# Patient Record
Sex: FEMALE | Race: OTHER | NOT HISPANIC OR LATINO | URBAN - METROPOLITAN AREA
[De-identification: names, ages, dates, MRNs, and addresses within clinical notes are randomized per-mention and may not be internally consistent; named-entity substitution may affect disease eponyms.]

---

## 2017-03-30 ENCOUNTER — EMERGENCY (EMERGENCY)
Facility: HOSPITAL | Age: 67
LOS: 1 days | Discharge: HOME | End: 2017-03-30
Attending: EMERGENCY MEDICINE | Admitting: EMERGENCY MEDICINE
Payer: MEDICARE

## 2017-03-30 VITALS
OXYGEN SATURATION: 100 % | RESPIRATION RATE: 18 BRPM | SYSTOLIC BLOOD PRESSURE: 167 MMHG | TEMPERATURE: 98 F | DIASTOLIC BLOOD PRESSURE: 73 MMHG | HEART RATE: 70 BPM

## 2017-03-30 VITALS
DIASTOLIC BLOOD PRESSURE: 83 MMHG | SYSTOLIC BLOOD PRESSURE: 151 MMHG | HEART RATE: 20 BPM | TEMPERATURE: 98 F | OXYGEN SATURATION: 100 % | RESPIRATION RATE: 20 BRPM

## 2017-03-30 LAB
ALBUMIN SERPL ELPH-MCNC: 4.5 G/DL — SIGNIFICANT CHANGE UP (ref 3.3–5)
ALP SERPL-CCNC: 116 U/L — SIGNIFICANT CHANGE UP (ref 40–120)
ALT FLD-CCNC: 32 U/L — SIGNIFICANT CHANGE UP (ref 4–33)
AST SERPL-CCNC: 25 U/L — SIGNIFICANT CHANGE UP (ref 4–32)
BASOPHILS # BLD AUTO: 0.03 K/UL — SIGNIFICANT CHANGE UP (ref 0–0.2)
BASOPHILS NFR BLD AUTO: 0.4 % — SIGNIFICANT CHANGE UP (ref 0–2)
BILIRUB SERPL-MCNC: 0.4 MG/DL — SIGNIFICANT CHANGE UP (ref 0.2–1.2)
BLD GP AB SCN SERPL QL: NEGATIVE — SIGNIFICANT CHANGE UP
BUN SERPL-MCNC: 14 MG/DL — SIGNIFICANT CHANGE UP (ref 7–23)
CALCIUM SERPL-MCNC: 10.1 MG/DL — SIGNIFICANT CHANGE UP (ref 8.4–10.5)
CHLORIDE SERPL-SCNC: 104 MMOL/L — SIGNIFICANT CHANGE UP (ref 98–107)
CO2 SERPL-SCNC: 24 MMOL/L — SIGNIFICANT CHANGE UP (ref 22–31)
CREAT SERPL-MCNC: 0.78 MG/DL — SIGNIFICANT CHANGE UP (ref 0.5–1.3)
EOSINOPHIL # BLD AUTO: 0.09 K/UL — SIGNIFICANT CHANGE UP (ref 0–0.5)
EOSINOPHIL NFR BLD AUTO: 1.3 % — SIGNIFICANT CHANGE UP (ref 0–6)
GLUCOSE SERPL-MCNC: 86 MG/DL — SIGNIFICANT CHANGE UP (ref 70–99)
HCT VFR BLD CALC: 41.7 % — SIGNIFICANT CHANGE UP (ref 34.5–45)
HGB BLD-MCNC: 14.3 G/DL — SIGNIFICANT CHANGE UP (ref 11.5–15.5)
IMM GRANULOCYTES NFR BLD AUTO: 0.1 % — SIGNIFICANT CHANGE UP (ref 0–1.5)
INR BLD: 1.06 — SIGNIFICANT CHANGE UP (ref 0.88–1.17)
LYMPHOCYTES # BLD AUTO: 2.67 K/UL — SIGNIFICANT CHANGE UP (ref 1–3.3)
LYMPHOCYTES # BLD AUTO: 38.8 % — SIGNIFICANT CHANGE UP (ref 13–44)
MCHC RBC-ENTMCNC: 30.3 PG — SIGNIFICANT CHANGE UP (ref 27–34)
MCHC RBC-ENTMCNC: 34.3 % — SIGNIFICANT CHANGE UP (ref 32–36)
MCV RBC AUTO: 88.3 FL — SIGNIFICANT CHANGE UP (ref 80–100)
MONOCYTES # BLD AUTO: 0.36 K/UL — SIGNIFICANT CHANGE UP (ref 0–0.9)
MONOCYTES NFR BLD AUTO: 5.2 % — SIGNIFICANT CHANGE UP (ref 2–14)
NEUTROPHILS # BLD AUTO: 3.72 K/UL — SIGNIFICANT CHANGE UP (ref 1.8–7.4)
NEUTROPHILS NFR BLD AUTO: 54.2 % — SIGNIFICANT CHANGE UP (ref 43–77)
PLATELET # BLD AUTO: 252 K/UL — SIGNIFICANT CHANGE UP (ref 150–400)
PMV BLD: 12 FL — SIGNIFICANT CHANGE UP (ref 7–13)
POTASSIUM SERPL-MCNC: 4.2 MMOL/L — SIGNIFICANT CHANGE UP (ref 3.5–5.3)
POTASSIUM SERPL-SCNC: 4.2 MMOL/L — SIGNIFICANT CHANGE UP (ref 3.5–5.3)
PROT SERPL-MCNC: 8.3 G/DL — SIGNIFICANT CHANGE UP (ref 6–8.3)
PROTHROM AB SERPL-ACNC: 11.9 SEC — SIGNIFICANT CHANGE UP (ref 9.8–13.1)
RBC # BLD: 4.72 M/UL — SIGNIFICANT CHANGE UP (ref 3.8–5.2)
RBC # FLD: 13.4 % — SIGNIFICANT CHANGE UP (ref 10.3–14.5)
RH IG SCN BLD-IMP: POSITIVE — SIGNIFICANT CHANGE UP
SODIUM SERPL-SCNC: 143 MMOL/L — SIGNIFICANT CHANGE UP (ref 135–145)
WBC # BLD: 6.88 K/UL — SIGNIFICANT CHANGE UP (ref 3.8–10.5)
WBC # FLD AUTO: 6.88 K/UL — SIGNIFICANT CHANGE UP (ref 3.8–10.5)

## 2017-03-30 PROCEDURE — 99284 EMERGENCY DEPT VISIT MOD MDM: CPT | Mod: GC

## 2017-03-30 NOTE — ED ADULT NURSE REASSESSMENT NOTE - NS ED NURSE REASSESS COMMENT FT1
Pt awake and alert c/o vaginal bleeding today but denies any pain. Pt denies any sob or dizziness. Pts vs wnl skin wnl. Pt denies sob. iv placed .Pts  at bedside awaiting dispo.

## 2017-03-30 NOTE — ED PROVIDER NOTE - MEDICAL DECISION MAKING DETAILS
66F w/ hx of ovarian cyst p/w painless vaginal bleeding. Not major bleeding and not hemodynamically compromised. Basic labs. Will discuss with OBGYN, Dr. Tanya Trejo and likely d/c home with outpatient follow up.

## 2017-03-30 NOTE — ED PROVIDER NOTE - PROGRESS NOTE DETAILS
Attempted to contact Dr. Trejo. Called her office and spoke with  who said they would text attending to my spectra. No call back after 30 minutes. Also paged OBGYN resident without call back. Will plan on dc home with close f/u with Dr. Trejo.

## 2017-03-30 NOTE — ED PROVIDER NOTE - OBJECTIVE STATEMENT
66F w/ hx of ovarian cyst presents today with vaginal bleeding that started this morning. Pt is post menopausal and does not normally have vaginal bleeding or spotting. Does have ovarian cyst that causes RLQ pain at baseline. No real change in pain today. Bleeding was noticed on wiping this AM. Not even bleeding through one pad. No pain with the bleeding. Previous cystitis earlier this year with hematuria. Bleeding today does not seem like it is coming from urethra or rectum. No dizziness upon standing, SOB, chest pain, n/v/d.

## 2017-03-30 NOTE — ED PROVIDER NOTE - ATTENDING CONTRIBUTION TO CARE
silvia: new onset vaginal bleeding. mild, but of concern to patient.   No other complaints.   Discussed need for GYN w/u, including a w/u to r/o malignancy.   Hct normal. pt is stable and can be discharged after attempting to call her GYN provider.

## 2017-04-02 ENCOUNTER — RESULT REVIEW (OUTPATIENT)
Age: 67
End: 2017-04-02

## 2018-12-31 NOTE — ED ADULT NURSE NOTE - PAIN: PRESENCE, MLM
denies pain/discomfort
Implemented All Universal Safety Interventions:  [place] to call system. Call bell, personal items and telephone within reach. Instruct patient to call for assistance. Room bathroom lighting operational. Non-slip footwear when patient is off stretcher. Physically safe environment: no spills, clutter or unnecessary equipment. Stretcher in lowest position, wheels locked, appropriate side rails in place.

## 2021-04-02 NOTE — ED PROVIDER NOTE - GENITOURINARY VULVA

## 2022-06-23 PROBLEM — N83.201 UNSPECIFIED OVARIAN CYST, RIGHT SIDE: Chronic | Status: ACTIVE | Noted: 2017-03-30

## 2022-06-29 ENCOUNTER — APPOINTMENT (OUTPATIENT)
Dept: MRI IMAGING | Facility: IMAGING CENTER | Age: 72
End: 2022-06-29

## 2022-06-29 ENCOUNTER — OUTPATIENT (OUTPATIENT)
Dept: OUTPATIENT SERVICES | Facility: HOSPITAL | Age: 72
LOS: 1 days | End: 2022-06-29
Payer: MEDICARE

## 2022-06-29 DIAGNOSIS — Z00.8 ENCOUNTER FOR OTHER GENERAL EXAMINATION: ICD-10-CM

## 2022-06-29 PROCEDURE — A9585: CPT

## 2022-06-29 PROCEDURE — 77049 MRI BREAST C-+ W/CAD BI: CPT | Mod: 26,MH

## 2022-06-29 PROCEDURE — C8908: CPT | Mod: MH

## 2022-06-29 PROCEDURE — C8937: CPT

## 2022-12-13 NOTE — ED ADULT TRIAGE NOTE - PRO INTERPRETER NEED 2
English Myalgia Monitoring: I explained this is common when taking isotretinoin. If this worsens they will contact us.

## 2022-12-23 ENCOUNTER — EMERGENCY (EMERGENCY)
Facility: HOSPITAL | Age: 72
LOS: 1 days | Discharge: HOME | End: 2022-12-23
Attending: EMERGENCY MEDICINE
Payer: MEDICARE

## 2022-12-23 VITALS
OXYGEN SATURATION: 99 % | RESPIRATION RATE: 16 BRPM | HEART RATE: 61 BPM | TEMPERATURE: 98 F | DIASTOLIC BLOOD PRESSURE: 67 MMHG | SYSTOLIC BLOOD PRESSURE: 131 MMHG

## 2022-12-23 VITALS
WEIGHT: 145.06 LBS | HEIGHT: 64 IN | DIASTOLIC BLOOD PRESSURE: 74 MMHG | TEMPERATURE: 98 F | OXYGEN SATURATION: 98 % | HEART RATE: 77 BPM | SYSTOLIC BLOOD PRESSURE: 172 MMHG | RESPIRATION RATE: 18 BRPM

## 2022-12-23 LAB
ALBUMIN SERPL ELPH-MCNC: 4.4 G/DL — SIGNIFICANT CHANGE UP (ref 3.3–5)
ALP SERPL-CCNC: 153 U/L — HIGH (ref 40–120)
ALT FLD-CCNC: 16 U/L — SIGNIFICANT CHANGE UP (ref 10–45)
ANION GAP SERPL CALC-SCNC: 10 MMOL/L — SIGNIFICANT CHANGE UP (ref 5–17)
AST SERPL-CCNC: 20 U/L — SIGNIFICANT CHANGE UP (ref 10–40)
BASOPHILS # BLD AUTO: 0.05 K/UL — SIGNIFICANT CHANGE UP (ref 0–0.2)
BASOPHILS NFR BLD AUTO: 0.8 % — SIGNIFICANT CHANGE UP (ref 0–2)
BILIRUB SERPL-MCNC: 0.3 MG/DL — SIGNIFICANT CHANGE UP (ref 0.2–1.2)
BUN SERPL-MCNC: 11 MG/DL — SIGNIFICANT CHANGE UP (ref 7–23)
CALCIUM SERPL-MCNC: 9.8 MG/DL — SIGNIFICANT CHANGE UP (ref 8.4–10.5)
CHLORIDE SERPL-SCNC: 106 MMOL/L — SIGNIFICANT CHANGE UP (ref 96–108)
CO2 SERPL-SCNC: 24 MMOL/L — SIGNIFICANT CHANGE UP (ref 22–31)
CREAT SERPL-MCNC: 0.73 MG/DL — SIGNIFICANT CHANGE UP (ref 0.5–1.3)
EGFR: 88 ML/MIN/1.73M2 — SIGNIFICANT CHANGE UP
EOSINOPHIL # BLD AUTO: 0.07 K/UL — SIGNIFICANT CHANGE UP (ref 0–0.5)
EOSINOPHIL NFR BLD AUTO: 1.1 % — SIGNIFICANT CHANGE UP (ref 0–6)
GLUCOSE SERPL-MCNC: 100 MG/DL — HIGH (ref 70–99)
HCT VFR BLD CALC: 41.6 % — SIGNIFICANT CHANGE UP (ref 34.5–45)
HGB BLD-MCNC: 13.6 G/DL — SIGNIFICANT CHANGE UP (ref 11.5–15.5)
IMM GRANULOCYTES NFR BLD AUTO: 0.3 % — SIGNIFICANT CHANGE UP (ref 0–0.9)
LYMPHOCYTES # BLD AUTO: 1.93 K/UL — SIGNIFICANT CHANGE UP (ref 1–3.3)
LYMPHOCYTES # BLD AUTO: 30.8 % — SIGNIFICANT CHANGE UP (ref 13–44)
MCHC RBC-ENTMCNC: 28.9 PG — SIGNIFICANT CHANGE UP (ref 27–34)
MCHC RBC-ENTMCNC: 32.7 GM/DL — SIGNIFICANT CHANGE UP (ref 32–36)
MCV RBC AUTO: 88.3 FL — SIGNIFICANT CHANGE UP (ref 80–100)
MONOCYTES # BLD AUTO: 0.36 K/UL — SIGNIFICANT CHANGE UP (ref 0–0.9)
MONOCYTES NFR BLD AUTO: 5.7 % — SIGNIFICANT CHANGE UP (ref 2–14)
NEUTROPHILS # BLD AUTO: 3.84 K/UL — SIGNIFICANT CHANGE UP (ref 1.8–7.4)
NEUTROPHILS NFR BLD AUTO: 61.3 % — SIGNIFICANT CHANGE UP (ref 43–77)
NRBC # BLD: 0 /100 WBCS — SIGNIFICANT CHANGE UP (ref 0–0)
PLATELET # BLD AUTO: 291 K/UL — SIGNIFICANT CHANGE UP (ref 150–400)
POTASSIUM SERPL-MCNC: 4.3 MMOL/L — SIGNIFICANT CHANGE UP (ref 3.5–5.3)
POTASSIUM SERPL-SCNC: 4.3 MMOL/L — SIGNIFICANT CHANGE UP (ref 3.5–5.3)
PROT SERPL-MCNC: 7.7 G/DL — SIGNIFICANT CHANGE UP (ref 6–8.3)
RAPID RVP RESULT: SIGNIFICANT CHANGE UP
RBC # BLD: 4.71 M/UL — SIGNIFICANT CHANGE UP (ref 3.8–5.2)
RBC # FLD: 13.1 % — SIGNIFICANT CHANGE UP (ref 10.3–14.5)
SARS-COV-2 RNA SPEC QL NAA+PROBE: SIGNIFICANT CHANGE UP
SODIUM SERPL-SCNC: 140 MMOL/L — SIGNIFICANT CHANGE UP (ref 135–145)
WBC # BLD: 6.27 K/UL — SIGNIFICANT CHANGE UP (ref 3.8–10.5)
WBC # FLD AUTO: 6.27 K/UL — SIGNIFICANT CHANGE UP (ref 3.8–10.5)

## 2022-12-23 PROCEDURE — 85025 COMPLETE CBC W/AUTO DIFF WBC: CPT

## 2022-12-23 PROCEDURE — 36415 COLL VENOUS BLD VENIPUNCTURE: CPT

## 2022-12-23 PROCEDURE — 70450 CT HEAD/BRAIN W/O DYE: CPT | Mod: 26,MA

## 2022-12-23 PROCEDURE — 80053 COMPREHEN METABOLIC PANEL: CPT

## 2022-12-23 PROCEDURE — 96374 THER/PROPH/DIAG INJ IV PUSH: CPT

## 2022-12-23 PROCEDURE — 70450 CT HEAD/BRAIN W/O DYE: CPT | Mod: MA

## 2022-12-23 PROCEDURE — 99284 EMERGENCY DEPT VISIT MOD MDM: CPT | Mod: 25

## 2022-12-23 PROCEDURE — 99284 EMERGENCY DEPT VISIT MOD MDM: CPT | Mod: FS

## 2022-12-23 PROCEDURE — 96375 TX/PRO/DX INJ NEW DRUG ADDON: CPT

## 2022-12-23 PROCEDURE — 0225U NFCT DS DNA&RNA 21 SARSCOV2: CPT

## 2022-12-23 RX ORDER — AMLODIPINE BESYLATE 2.5 MG/1
1 TABLET ORAL
Qty: 21 | Refills: 0
Start: 2022-12-23 | End: 2023-01-12

## 2022-12-23 RX ORDER — AMLODIPINE BESYLATE 2.5 MG/1
1 TABLET ORAL
Qty: 0 | Refills: 0 | DISCHARGE
Start: 2022-12-23 | End: 2023-01-12

## 2022-12-23 RX ORDER — KETOROLAC TROMETHAMINE 30 MG/ML
15 SYRINGE (ML) INJECTION ONCE
Refills: 0 | Status: DISCONTINUED | OUTPATIENT
Start: 2022-12-23 | End: 2022-12-23

## 2022-12-23 RX ORDER — METOCLOPRAMIDE HCL 10 MG
10 TABLET ORAL ONCE
Refills: 0 | Status: COMPLETED | OUTPATIENT
Start: 2022-12-23 | End: 2022-12-23

## 2022-12-23 RX ORDER — SODIUM CHLORIDE 9 MG/ML
1000 INJECTION, SOLUTION INTRAVENOUS ONCE
Refills: 0 | Status: DISCONTINUED | OUTPATIENT
Start: 2022-12-23 | End: 2022-12-23

## 2022-12-23 RX ORDER — ACETAMINOPHEN 500 MG
975 TABLET ORAL ONCE
Refills: 0 | Status: COMPLETED | OUTPATIENT
Start: 2022-12-23 | End: 2022-12-23

## 2022-12-23 RX ADMIN — Medication 15 MILLIGRAM(S): at 11:16

## 2022-12-23 RX ADMIN — Medication 10 MILLIGRAM(S): at 10:00

## 2022-12-23 RX ADMIN — Medication 15 MILLIGRAM(S): at 11:26

## 2022-12-23 RX ADMIN — Medication 975 MILLIGRAM(S): at 09:57

## 2022-12-23 RX ADMIN — Medication 975 MILLIGRAM(S): at 10:15

## 2022-12-23 NOTE — ED PROVIDER NOTE - OBJECTIVE STATEMENT
73yo F with PMH of HTN, HLD, presenting with elevated BP and HA x weeks, worsening symptoms x 1 week. Reports fluctuating BPs at home, this week reports systolic 170s, 180s. Has been prescribed a second BP med by cardiologist and a third by outside ED, but only takes cardizem 120mg daily. Reports she gets intermittent HAs and lightheadedness, but this week HA has been more frequent. HA is throbbing, R-sided, relieved with tylenol. Has not taken anything for pain today. Also reports associated nausea today and intermittent blurry vision in her R eye. Wears glasses, has not seen optho for over 1 year.  States she came to ED because BP was highest today, and called her cardiology office and notified her doctor no longer works there. Of note, also reports URI x 1 month with cough and congestion. No fevers. Denies double vision, neck pain/stiffness, CP, SOB, vomiting, abdominal pain, LE pain/swelling, numbness/tingling. 71yo F with PMH of HTN, HLD, presenting with elevated BP and HA x weeks, worsening symptoms x 1 week. Reports fluctuating BPs at home, this week reports systolic 170s, 180s. Has been prescribed a second BP med by cardiologist and a third by outside ED, but only takes cardizem 120mg daily. Reports she gets intermittent HAs and lightheadedness, but this week HA has been more frequent. HA is throbbing, R-sided, relieved with tylenol. Has not taken anything for pain today. Also reports associated nausea today and intermittent blurry vision in her R eye for a few days. Wears glasses, has not seen optho for over 1 year.  States she came to ED because BP was highest today, and called her cardiology office and notified her doctor no longer works there. Of note, also reports URI x 1 month with cough and congestion. No fevers. Denies double vision, neck pain/stiffness, CP, SOB, vomiting, abdominal pain, LE pain/swelling, numbness/tingling.

## 2022-12-23 NOTE — ED PROVIDER NOTE - PROGRESS NOTE DETAILS
pt updated on results. Reports her HA improved to 6/10 from 7/10, no current nausea or lightheadedness. - Carolyne Sy PA-C Pt reports her HA feels much better, would like to go home. Ambulatory to bathroom without difficulty or assistance. Plan to d/c on amlodipine 5mg daily and f/u with PCP. - KIRA LamarC

## 2022-12-23 NOTE — ED PROVIDER NOTE - CARE PLAN
1 Principal Discharge DX:	Headache, chronic daily   Principal Discharge DX:	Acute headache with normal neurologic examination  Secondary Diagnosis:	Elevated blood pressure reading with diagnosis of hypertension

## 2022-12-23 NOTE — ED PROVIDER NOTE - PATIENT PORTAL LINK FT
You can access the FollowMyHealth Patient Portal offered by [facility] by registering at the following website: [email or web address]. By joining [employer]’s FollowMyHealth portal, you will also be able to view your health information using other applications (apps) compatible with our system.

## 2022-12-23 NOTE — ED PROVIDER NOTE - ATTENDING APP SHARED VISIT CONTRIBUTION OF CARE
Attending MD Callahan:   I personally have seen and examined this patient. I have performed a substantive portion of the visit including all aspects of the medical decision making.   Physician assistant note reviewed and agree on plan of care and except where noted.          72-year-old with history of hypertension hyperlipidemia is presenting for evaluation of right-sided headache pain nausea and elevated blood pressure to as high as 190 at home.  The patient states that her head pain is located in the right parietal and right occipital region.  It is intermittent.  There are no obvious aggravating factors.  It is slightly better with Tylenol.  She has not vomited.  She does note intermittently she has some blurred vision of the right eye.  There is no double vision or loss of vision.  There is no numbness or tingling of the extremities.  She states that she is had the head pain for years now intermittently.  She has seen a neurologist in the past however she does not know what the diagnosis is.  Patient takes Cardizem for her blood pressure.  She has previously also been prescribed losartan and amlodipine from other providers.  She does not take these.  Patient currently has mild right-sided head pain.    Vital signs notable for elevated blood pressure 170, on recheck 160 systolic without intervention.  Patient in no apparent distress sitting up in the stretcher.  No meningismus.  Heart sounds regular clear lungs anteriorly.  Awake and alert. Symmetric eyebrow raise, symmetric eyelid closure. PERRL b/l, EOMI b/l, symmetric smile, tongue midline.  5/5  strength bilaterally, 5/5 elbow flexion bilaterally, 5/5 elbow extension bilaterally, 5/5 shoulder shrug b/l.  5/5 plantar and dorsiflexion b/l, 5/5 knee flexion and extension b/l, 5/5 hip flexion and extension b/l. Sensation intact and symmetric grossly to light touch throughout face and bilateral upper and lower extremities,  Finger to nose normal bilaterally.       Plan to obtain CT head to rule out mass lesion or IPH given age and right-sided headache.  Will obtain IOP measurement on the right to rule out angle-closure glaucoma as etiology for head pain but low suspicion.  I do not suspect malignant hypertension as the etiology for patient's head pain at this time.  The patient was counseled at length as to the management of hypertension as an outpatient.  Patient educated on how to obtain ambulatory blood pressure measurements.  Patient will need to see her primary care physician for longitudinal management of hypertension. Will provide analgesia for headache.              *The above represents an initial assessment/impression. Please refer to progress notes for potential changes in patient clinical course*

## 2022-12-23 NOTE — ED PROVIDER NOTE - NSFOLLOWUPINSTRUCTIONS_ED_ALL_ED_FT
Rest. Stay well hydrated.     Please take _____ for your blood pressure control.     Please follow up with your Primary Care Provider and Cardiologist ______in office this week for further evaluation and management.    Show copies of your reports given to you.      Return to ER for any new/worsening headache, new vision changes, chest pain, shortness of breath, weakness, passing out, or any other concerning symptoms. Rest. Stay well hydrated.     Please take amlodipine 5mg daily for your blood pressure control as discussed.     Please follow up with your Primary Care Provider in office this week for further evaluation and management.    Show copies of your reports given to you.      Return to ER for any new/worsening headache, new vision changes, chest pain, shortness of breath, weakness, passing out, or any other concerning symptoms.

## 2022-12-23 NOTE — ED PROVIDER NOTE - NS ED ATTENDING STATEMENT MOD
This was a shared visit with the ANGELY. I reviewed and verified the documentation and independently performed the documented:

## 2022-12-23 NOTE — ED PROVIDER NOTE - NEUROLOGICAL, MLM
Alert and oriented, no focal deficits, no motor or sensory deficits. Gait not assessed on initial exam.

## 2022-12-23 NOTE — ED ADULT NURSE NOTE - OBJECTIVE STATEMENT
72y f c/o headache and high blood pressure; has headaches a lot; this one has lasted a week; pt indicated top right side of head; pt has not taken any medications today; pt concerned bp keeps going up and her cardiologist's "last day was yesterday"; pt not taking one of her bp medications because "it gives me a headache"; pt states went to er in [place] and was given another bp medication to take "when needed"; pt states is feeling nauseous today; pt states has taken tylenol in the past with some relief for headache; pt came in today because bp keeps going up; aox3; no resp distress; no chest pain; no abd pain; no v/d; denies fever/chills; no numbness/tingling; no recent falls; has some blurriness to right eye; last eye check over a year ago;  at bedside

## 2023-01-03 RX ORDER — AMLODIPINE BESYLATE 2.5 MG/1
1 TABLET ORAL
Qty: 30 | Refills: 0
Start: 2023-01-03 | End: 2023-02-01

## 2023-01-03 NOTE — ED POST DISCHARGE NOTE - RESULT SUMMARY
Pt is phylicia to run out of amlodipine before her cardiologist appointment in a couple week, sent a refill of the same medication and dosage.  Dhara

## 2023-01-08 ENCOUNTER — INPATIENT (INPATIENT)
Facility: HOSPITAL | Age: 73
LOS: 2 days | Discharge: HOME | DRG: 247 | End: 2023-01-11
Attending: INTERNAL MEDICINE | Admitting: HOSPITALIST
Payer: MEDICARE

## 2023-01-08 VITALS
DIASTOLIC BLOOD PRESSURE: 82 MMHG | RESPIRATION RATE: 18 BRPM | HEART RATE: 92 BPM | WEIGHT: 139.99 LBS | HEIGHT: 64 IN | OXYGEN SATURATION: 98 % | SYSTOLIC BLOOD PRESSURE: 179 MMHG | TEMPERATURE: 98 F

## 2023-01-08 PROCEDURE — 99285 EMERGENCY DEPT VISIT HI MDM: CPT

## 2023-01-09 ENCOUNTER — TRANSCRIPTION ENCOUNTER (OUTPATIENT)
Age: 73
End: 2023-01-09

## 2023-01-09 DIAGNOSIS — I20.0 UNSTABLE ANGINA: ICD-10-CM

## 2023-01-09 LAB
ALBUMIN SERPL ELPH-MCNC: 4.2 G/DL — SIGNIFICANT CHANGE UP (ref 3.3–5)
ALP SERPL-CCNC: 125 U/L — HIGH (ref 40–120)
ALT FLD-CCNC: 13 U/L — SIGNIFICANT CHANGE UP (ref 10–45)
ANION GAP SERPL CALC-SCNC: 13 MMOL/L — SIGNIFICANT CHANGE UP (ref 5–17)
APTT BLD: 39.5 SEC — HIGH (ref 27.5–35.5)
AST SERPL-CCNC: 19 U/L — SIGNIFICANT CHANGE UP (ref 10–40)
BASOPHILS # BLD AUTO: 0.07 K/UL — SIGNIFICANT CHANGE UP (ref 0–0.2)
BASOPHILS NFR BLD AUTO: 1 % — SIGNIFICANT CHANGE UP (ref 0–2)
BILIRUB SERPL-MCNC: 0.3 MG/DL — SIGNIFICANT CHANGE UP (ref 0.2–1.2)
BUN SERPL-MCNC: 11 MG/DL — SIGNIFICANT CHANGE UP (ref 7–23)
CALCIUM SERPL-MCNC: 10.1 MG/DL — SIGNIFICANT CHANGE UP (ref 8.4–10.5)
CHLORIDE SERPL-SCNC: 105 MMOL/L — SIGNIFICANT CHANGE UP (ref 96–108)
CO2 SERPL-SCNC: 22 MMOL/L — SIGNIFICANT CHANGE UP (ref 22–31)
CREAT SERPL-MCNC: 0.79 MG/DL — SIGNIFICANT CHANGE UP (ref 0.5–1.3)
EGFR: 80 ML/MIN/1.73M2 — SIGNIFICANT CHANGE UP
EOSINOPHIL # BLD AUTO: 0.11 K/UL — SIGNIFICANT CHANGE UP (ref 0–0.5)
EOSINOPHIL NFR BLD AUTO: 1.6 % — SIGNIFICANT CHANGE UP (ref 0–6)
FLUAV AG NPH QL: SIGNIFICANT CHANGE UP
FLUBV AG NPH QL: SIGNIFICANT CHANGE UP
GLUCOSE SERPL-MCNC: 98 MG/DL — SIGNIFICANT CHANGE UP (ref 70–99)
HCT VFR BLD CALC: 37.6 % — SIGNIFICANT CHANGE UP (ref 34.5–45)
HGB BLD-MCNC: 12.6 G/DL — SIGNIFICANT CHANGE UP (ref 11.5–15.5)
IMM GRANULOCYTES NFR BLD AUTO: 0.1 % — SIGNIFICANT CHANGE UP (ref 0–0.9)
INR BLD: 1.19 RATIO — HIGH (ref 0.88–1.16)
LYMPHOCYTES # BLD AUTO: 3.08 K/UL — SIGNIFICANT CHANGE UP (ref 1–3.3)
LYMPHOCYTES # BLD AUTO: 44 % — SIGNIFICANT CHANGE UP (ref 13–44)
MAGNESIUM SERPL-MCNC: 2.3 MG/DL — SIGNIFICANT CHANGE UP (ref 1.6–2.6)
MCHC RBC-ENTMCNC: 29.4 PG — SIGNIFICANT CHANGE UP (ref 27–34)
MCHC RBC-ENTMCNC: 33.5 GM/DL — SIGNIFICANT CHANGE UP (ref 32–36)
MCV RBC AUTO: 87.6 FL — SIGNIFICANT CHANGE UP (ref 80–100)
MONOCYTES # BLD AUTO: 0.6 K/UL — SIGNIFICANT CHANGE UP (ref 0–0.9)
MONOCYTES NFR BLD AUTO: 8.6 % — SIGNIFICANT CHANGE UP (ref 2–14)
NEUTROPHILS # BLD AUTO: 3.13 K/UL — SIGNIFICANT CHANGE UP (ref 1.8–7.4)
NEUTROPHILS NFR BLD AUTO: 44.7 % — SIGNIFICANT CHANGE UP (ref 43–77)
NRBC # BLD: 0 /100 WBCS — SIGNIFICANT CHANGE UP (ref 0–0)
NT-PROBNP SERPL-SCNC: <36 PG/ML — SIGNIFICANT CHANGE UP (ref 0–300)
PLATELET # BLD AUTO: 211 K/UL — SIGNIFICANT CHANGE UP (ref 150–400)
POTASSIUM SERPL-MCNC: 4 MMOL/L — SIGNIFICANT CHANGE UP (ref 3.5–5.3)
POTASSIUM SERPL-SCNC: 4 MMOL/L — SIGNIFICANT CHANGE UP (ref 3.5–5.3)
PROT SERPL-MCNC: 7.4 G/DL — SIGNIFICANT CHANGE UP (ref 6–8.3)
PROTHROM AB SERPL-ACNC: 13.7 SEC — HIGH (ref 10.5–13.4)
RBC # BLD: 4.29 M/UL — SIGNIFICANT CHANGE UP (ref 3.8–5.2)
RBC # FLD: 13.2 % — SIGNIFICANT CHANGE UP (ref 10.3–14.5)
RSV RNA NPH QL NAA+NON-PROBE: SIGNIFICANT CHANGE UP
SARS-COV-2 RNA SPEC QL NAA+PROBE: SIGNIFICANT CHANGE UP
SODIUM SERPL-SCNC: 140 MMOL/L — SIGNIFICANT CHANGE UP (ref 135–145)
TROPONIN T, HIGH SENSITIVITY RESULT: <6 NG/L — SIGNIFICANT CHANGE UP (ref 0–51)
TROPONIN T, HIGH SENSITIVITY RESULT: <6 NG/L — SIGNIFICANT CHANGE UP (ref 0–51)
WBC # BLD: 7 K/UL — SIGNIFICANT CHANGE UP (ref 3.8–10.5)
WBC # FLD AUTO: 7 K/UL — SIGNIFICANT CHANGE UP (ref 3.8–10.5)

## 2023-01-09 PROCEDURE — 71046 X-RAY EXAM CHEST 2 VIEWS: CPT | Mod: 26

## 2023-01-09 PROCEDURE — 93018 CV STRESS TEST I&R ONLY: CPT

## 2023-01-09 PROCEDURE — 93306 TTE W/DOPPLER COMPLETE: CPT | Mod: 26

## 2023-01-09 PROCEDURE — 78452 HT MUSCLE IMAGE SPECT MULT: CPT | Mod: 26

## 2023-01-09 PROCEDURE — 93016 CV STRESS TEST SUPVJ ONLY: CPT

## 2023-01-09 RX ORDER — ATORVASTATIN CALCIUM 80 MG/1
20 TABLET, FILM COATED ORAL AT BEDTIME
Refills: 0 | Status: DISCONTINUED | OUTPATIENT
Start: 2023-01-09 | End: 2023-01-11

## 2023-01-09 RX ORDER — AMLODIPINE BESYLATE 2.5 MG/1
5 TABLET ORAL DAILY
Refills: 0 | Status: DISCONTINUED | OUTPATIENT
Start: 2023-01-09 | End: 2023-01-11

## 2023-01-09 RX ORDER — ASPIRIN/CALCIUM CARB/MAGNESIUM 324 MG
81 TABLET ORAL DAILY
Refills: 0 | Status: DISCONTINUED | OUTPATIENT
Start: 2023-01-09 | End: 2023-01-11

## 2023-01-09 RX ORDER — ASPIRIN/CALCIUM CARB/MAGNESIUM 324 MG
324 TABLET ORAL ONCE
Refills: 0 | Status: COMPLETED | OUTPATIENT
Start: 2023-01-09 | End: 2023-01-09

## 2023-01-09 RX ORDER — FAMOTIDINE 10 MG/ML
20 INJECTION INTRAVENOUS ONCE
Refills: 0 | Status: COMPLETED | OUTPATIENT
Start: 2023-01-09 | End: 2023-01-09

## 2023-01-09 RX ORDER — ENOXAPARIN SODIUM 100 MG/ML
40 INJECTION SUBCUTANEOUS EVERY 24 HOURS
Refills: 0 | Status: DISCONTINUED | OUTPATIENT
Start: 2023-01-09 | End: 2023-01-11

## 2023-01-09 RX ADMIN — Medication 324 MILLIGRAM(S): at 03:24

## 2023-01-09 RX ADMIN — FAMOTIDINE 20 MILLIGRAM(S): 10 INJECTION INTRAVENOUS at 03:24

## 2023-01-09 NOTE — CONSULT NOTE ADULT - ASSESSMENT
Chest pain   no evidence of acute MI   obtain echo   obtain stress test     HTN  stable  cont current meds

## 2023-01-09 NOTE — H&P ADULT - NSHPPHYSICALEXAM_GEN_ALL_CORE
General: WN/WD NAD  PERRLA  Neurology: A&Ox3, nonfocal, GRANDA x 4  Respiratory: CTA B/L  CV: RRR, S1S2, no murmurs, rubs or gallops  Abdominal: Soft, NT, ND +BS, Last BM  Extremities: No edema, + peripheral pulses  Skin Normal

## 2023-01-09 NOTE — DISCHARGE NOTE PROVIDER - HOSPITAL COURSE
HPI:  72-year-old female, past medical history HTN and HLD, presents to ED complaining of intermittent, midsternal, pressure-like, exertional, chest pain x2 days associated with dyspnea on exertion.  Patient has never had similar pain in the past.  Patient has not received stress/echo in many years.  Patient denies fever/chills, abdominal pain, diaphoresis, urinary symptoms, weakness/numbness, lightheadedness or dizziness. (09 Jan 2023 09:26)

## 2023-01-09 NOTE — H&P ADULT - NSHPLABSRESULTS_GEN_ALL_CORE
Lab Results:  CBC  CBC Full  -  ( 09 Jan 2023 03:35 )  WBC Count : 7.00 K/uL  RBC Count : 4.29 M/uL  Hemoglobin : 12.6 g/dL  Hematocrit : 37.6 %  Platelet Count - Automated : 211 K/uL  Mean Cell Volume : 87.6 fl  Mean Cell Hemoglobin : 29.4 pg  Mean Cell Hemoglobin Concentration : 33.5 gm/dL  Auto Neutrophil # : 3.13 K/uL  Auto Lymphocyte # : 3.08 K/uL  Auto Monocyte # : 0.60 K/uL  Auto Eosinophil # : 0.11 K/uL  Auto Basophil # : 0.07 K/uL  Auto Neutrophil % : 44.7 %  Auto Lymphocyte % : 44.0 %  Auto Monocyte % : 8.6 %  Auto Eosinophil % : 1.6 %  Auto Basophil % : 1.0 %    .		Differential:	[] Automated		[] Manual  Chemistry                        12.6   7.00  )-----------( 211      ( 09 Jan 2023 03:35 )             37.6     01-09    140  |  105  |  11  ----------------------------<  98  4.0   |  22  |  0.79    Ca    10.1      09 Jan 2023 03:35  Mg     2.3     01-09    TPro  7.4  /  Alb  4.2  /  TBili  0.3  /  DBili  x   /  AST  19  /  ALT  13  /  AlkPhos  125<H>  01-09    LIVER FUNCTIONS - ( 09 Jan 2023 03:35 )  Alb: 4.2 g/dL / Pro: 7.4 g/dL / ALK PHOS: 125 U/L / ALT: 13 U/L / AST: 19 U/L / GGT: x           PT/INR - ( 09 Jan 2023 03:35 )   PT: 13.7 sec;   INR: 1.19 ratio         PTT - ( 09 Jan 2023 03:35 )  PTT:39.5 sec          MICROBIOLOGY/CULTURES:      RADIOLOGY RESULTS: reviewed

## 2023-01-09 NOTE — DISCHARGE NOTE PROVIDER - NSDCFUSCHEDAPPT_GEN_ALL_CORE_FT
Isabel Polk Physician [facility]  CARDIOLOGY [id number]  Scheduled Appointment: 01/25/2023

## 2023-01-09 NOTE — ED PROVIDER NOTE - PROGRESS NOTE DETAILS
Minnie Childs PGY3: Trop <6. Labs unremarkable. CXR  unremarkable. PT w/o chest pain at this time. Will admit to tele  for stress d/t unstable angina.

## 2023-01-09 NOTE — ED ADULT NURSE REASSESSMENT NOTE - NS ED NURSE REASSESS COMMENT FT1
Report received from LINA Sewell. Pt A&Ox4. Does not appear to be in any acute distress at this time. Does not endorse any complaints. VS documented. Pt on portable telemetry (normal sinus). Pt admitted to tele. Pending bed assignment.

## 2023-01-09 NOTE — ED ADULT NURSE REASSESSMENT NOTE - NS ED NURSE REASSESS COMMENT FT1
Care assumed. Pt found resting quietly on stretcher with eyes closed. No apparent distress noted. Resp even and unlabored. VS stable. Awaiting bed assignment for admission.

## 2023-01-09 NOTE — DISCHARGE NOTE PROVIDER - CARE PROVIDER_API CALL
Nael Benavidez)  Internal Medicine  [address] [place] [address], [address]  [place] [address]  Phone: [phone]  Fax: [phone]  Follow Up Time:

## 2023-01-09 NOTE — ED ADULT NURSE REASSESSMENT NOTE - NS ED NURSE REASSESS COMMENT FT1
Pt back in dept from stress. VS updated and stable. No apparent distress noted. Denies any complaints at this time. Results pending for possible. dispo.  at bedside.

## 2023-01-09 NOTE — DISCHARGE NOTE PROVIDER - NSDCCPCAREPLAN_GEN_ALL_CORE_FT
PRINCIPAL DISCHARGE DIAGNOSIS  Diagnosis: Unstable angina  Assessment and Plan of Treatment:   HOME CARE INSTRUCTIONS  For the next few days, avoid physical activities that bring on chest pain. Continue physical activities as directed.  Do not smoke.  Avoid drinking alcohol.   Only take over-the-counter or prescription medicine for pain, discomfort, or fever as directed by your caregiver.  Follow your caregiver's suggestions for further testing if your chest pain does not go away.  Keep any follow-up appointments you made. If you do not go to an appointment, you could develop lasting (chronic) problems with pain. If there is any problem keeping an appointment, you must call to reschedule.   SEEK MEDICAL CARE IF:  You think you are having problems from the medicine you are taking. Read your medicine instructions carefully.  Your chest pain does not go away, even after treatment.  You develop a rash with blisters on your chest.  SEEK IMMEDIATE MEDICAL CARE IF:  You have increased chest pain or pain that spreads to your arm, neck, jaw, back, or abdomen.   You develop shortness of breath, an increasing cough, or you are coughing up blood.  You have severe back or abdominal pain, feel nauseous, or vomit.  You develop severe weakness, fainting, or chills.  You have a fever.        SECONDARY DISCHARGE DIAGNOSES  Diagnosis: HTN (hypertension)  Assessment and Plan of Treatment:

## 2023-01-09 NOTE — ED PROVIDER NOTE - NS ED ROS FT
Gen: Denies fever, weight loss  HEENT: Denies vision changes, ear pain, epistaxis, sore throat  CV: Denies palpitations; +chest pain  Skin: Denies rash, erythema, color changes  Resp: Denies cough; +SOB/REYES  Endo: Denies sensitivity to heat, cold, increased urination  GI: Denies abdominal pain, constipation, nausea, vomiting, diarrhea  Msk: Denies back pain, LE swelling, extremity pain  : Denies dysuria, increased frequency  Neuro: Denies LOC, weakness, numbness, tingling  Psych: Denies hx of psych, hallucinations  ROS statement: all other ROS negative except as per HPI

## 2023-01-09 NOTE — DISCHARGE NOTE PROVIDER - NSDCMRMEDTOKEN_GEN_ALL_CORE_FT
amLODIPine 5 mg oral tablet: 1 tab(s) orally once a day   amLODIPine 5 mg oral tablet: 1 tab(s) orally once a day    amLODIPine 5 mg oral tablet: 1 tab(s) orally once a day   Aspirin Enteric Coated 81 mg oral delayed release tablet: 1 tab(s) orally once a day MDD:1   atorvastatin 20 mg oral tablet: 1 tab(s) orally once a day (at bedtime)  cephalexin 250 mg oral capsule: 1 cap(s) orally every 6 hours MDD:4  clopidogrel 75 mg oral tablet: 1 tab(s) orally once a day MDD:1

## 2023-01-09 NOTE — ED PROVIDER NOTE - ATTENDING CONTRIBUTION TO CARE
------------ATTENDING NOTE------------   pt w/  c/o chest pain, describes recurrent episodes of mild/moderate central chest ache and feeling sob/fatigued w/ exertion, lasting approx 30 min, resolves w/ rest, no palpitations or near/syncope, concerning for Unstable Angina as h/o HTN/HLD, no current pain/discomfort or symptoms, clear chest w/o distress, nml cardiac exam, equal distal pulses, soft benign abd, trace symm BLE edema w/o calf tenderness, awaiting labs/imaging and planned admission for continued hanson, cardiac monitoring, Cardiology consult, optimize medical mgmt -->  - Raffy Aguayo MD   ----------------------------------------------

## 2023-01-09 NOTE — CONSULT NOTE ADULT - SUBJECTIVE AND OBJECTIVE BOX
CHIEF COMPLAINT:Patient is a 72y old  Female who presents with a chief complaint of chest pain (09 Jan 2023 09:26)      HISTORY OF PRESENT ILLNESS:    72 female with history as below, has been having labile BP with feeling of " shakes " and intermittent chest pressure  no dizziness  no syncope   no palpitation     PAST MEDICAL & SURGICAL HISTORY:  Cyst of right ovary      HTN (hypertension)      HLD (hyperlipidemia)      No significant past surgical history              MEDICATIONS:  amLODIPine   Tablet 5 milliGRAM(s) Oral daily                  FAMILY HISTORY:      Non-contributory    SOCIAL HISTORY:    No tobacco, drugs or etoh    Allergies    fluoroquinolone antibiotics (Rash)  Gadavist (Other)  shellfish (Hives)  sulfADIAZINE (Hives)    Intolerances    	    REVIEW OF SYSTEMS:  as above  The rest of the 14 points ROS reviewed and except above they are unremarkable.        PHYSICAL EXAM:  T(C): 36.6 (01-09-23 @ 06:35), Max: 36.9 (01-08-23 @ 22:02)  HR: 67 (01-09-23 @ 08:12) (66 - 92)  BP: 116/76 (01-09-23 @ 08:12) (116/76 - 179/82)  RR: 16 (01-09-23 @ 08:12) (16 - 18)  SpO2: 99% (01-09-23 @ 08:12) (97% - 99%)  Wt(kg): --  I&O's Summary    JVP: Normal  Neck: supple  Lung: clear   CV: S1 S2 , Murmur:  Abd: soft  Ext: No edema  neuro: Awake / alert  Psych: flat affect  Skin: normal    LABS/DATA:    TELEMETRY: 	    ECG:  	   	  CARDIAC MARKERS:                        <6 <<== 01-09-23 @ 03:35                              12.6   7.00  )-----------( 211      ( 09 Jan 2023 03:35 )             37.6     01-09    140  |  105  |  11  ----------------------------<  98  4.0   |  22  |  0.79    Ca    10.1      09 Jan 2023 03:35  Mg     2.3     01-09    TPro  7.4  /  Alb  4.2  /  TBili  0.3  /  DBili  x   /  AST  19  /  ALT  13  /  AlkPhos  125<H>  01-09    proBNP: Serum Pro-Brain Natriuretic Peptide: <36 pg/mL (01-09 @ 03:35)    Lipid Profile:   HgA1c:   TSH:

## 2023-01-09 NOTE — H&P ADULT - ASSESSMENT
72-year-old female, past medical history HTN and HLD, presents to ED complaining of intermittent, midsternal, pressure-like, exertional, chest pain x2 days associated with dyspnea on exertion.  Patient has never had similar pain in the past.  Patient has not received stress/echo in many years.  Patient denies fever/chills, abdominal pain, diaphoresis, urinary symptoms, weakness/numbness, lightheadedness or dizziness.    1 Chest pain  - telemonitor  - cards fu   - check stress test and echo  - asa. statin  - will monitor    2 HTN  - cw amlodiine    3 HLD  - cw statin

## 2023-01-09 NOTE — ED PROVIDER NOTE - OBJECTIVE STATEMENT
72-year-old female, past medical history HTN and HLD, presents to ED complaining of intermittent, midsternal, pressure-like, exertional, chest pain x2 days associated with dyspnea on exertion.  Patient has never had similar pain in the past.  Patient has not received stress/echo in many years.  Patient denies fever/chills, abdominal pain, diaphoresis, urinary symptoms, weakness/numbness, lightheadedness or dizziness.

## 2023-01-09 NOTE — ED ADULT NURSE NOTE - OBJECTIVE STATEMENT
Pt is a 72y F PMH HTN, HLD c/o intermittent midsternal chest pain x2 days. Pt states pain feels like "pressure" and worsens on exertion. Pt denies fever, chills, cough, sob, dizziness, ha, blurry vision, vision changes, cp, NVD, abd pain, urinary symptoms. Pt is well appearing, A&Ox3, neuro status intact, breathing unlabored and spontaneous, abd soft nontender nondistended, full strength and ROM of all extremities. Pt resting in stretcher, bed in lowest position, educated on call bell, family member at bedside aware of plan of care. Comfort and safety measures maintained.

## 2023-01-10 LAB
APPEARANCE UR: CLEAR — SIGNIFICANT CHANGE UP
BACTERIA # UR AUTO: NEGATIVE — SIGNIFICANT CHANGE UP
BILIRUB UR-MCNC: NEGATIVE — SIGNIFICANT CHANGE UP
CK MB CFR SERPL CALC: 1.5 NG/ML — SIGNIFICANT CHANGE UP (ref 0–3.8)
CK SERPL-CCNC: 70 U/L — SIGNIFICANT CHANGE UP (ref 25–170)
COLOR SPEC: SIGNIFICANT CHANGE UP
DIFF PNL FLD: NEGATIVE — SIGNIFICANT CHANGE UP
EPI CELLS # UR: 0 /HPF — SIGNIFICANT CHANGE UP
GLUCOSE UR QL: NEGATIVE — SIGNIFICANT CHANGE UP
HYALINE CASTS # UR AUTO: 0 /LPF — SIGNIFICANT CHANGE UP (ref 0–2)
KETONES UR-MCNC: NEGATIVE — SIGNIFICANT CHANGE UP
LEUKOCYTE ESTERASE UR-ACNC: ABNORMAL
NITRITE UR-MCNC: NEGATIVE — SIGNIFICANT CHANGE UP
PH UR: 5 — SIGNIFICANT CHANGE UP (ref 5–8)
PROT UR-MCNC: NEGATIVE — SIGNIFICANT CHANGE UP
RBC CASTS # UR COMP ASSIST: 1 /HPF — SIGNIFICANT CHANGE UP (ref 0–4)
SP GR SPEC: 1.02 — SIGNIFICANT CHANGE UP (ref 1.01–1.02)
TROPONIN T, HIGH SENSITIVITY RESULT: <6 NG/L — SIGNIFICANT CHANGE UP (ref 0–51)
UROBILINOGEN FLD QL: NEGATIVE — SIGNIFICANT CHANGE UP
WBC UR QL: 5 /HPF — SIGNIFICANT CHANGE UP (ref 0–5)

## 2023-01-10 PROCEDURE — 99152 MOD SED SAME PHYS/QHP 5/>YRS: CPT

## 2023-01-10 PROCEDURE — 92978 ENDOLUMINL IVUS OCT C 1ST: CPT | Mod: 26,LD

## 2023-01-10 PROCEDURE — 93458 L HRT ARTERY/VENTRICLE ANGIO: CPT | Mod: 26,59

## 2023-01-10 PROCEDURE — 93010 ELECTROCARDIOGRAM REPORT: CPT

## 2023-01-10 PROCEDURE — 92928 PRQ TCAT PLMT NTRAC ST 1 LES: CPT | Mod: LD

## 2023-01-10 PROCEDURE — 93571 IV DOP VEL&/PRESS C FLO 1ST: CPT | Mod: 26,LC

## 2023-01-10 RX ORDER — SODIUM CHLORIDE 9 MG/ML
1000 INJECTION INTRAMUSCULAR; INTRAVENOUS; SUBCUTANEOUS
Refills: 0 | Status: DISCONTINUED | OUTPATIENT
Start: 2023-01-10 | End: 2023-01-11

## 2023-01-10 RX ORDER — FENTANYL CITRATE 50 UG/ML
12.5 INJECTION INTRAVENOUS
Refills: 0 | Status: DISCONTINUED | OUTPATIENT
Start: 2023-01-10 | End: 2023-01-11

## 2023-01-10 RX ORDER — CLOPIDOGREL BISULFATE 75 MG/1
75 TABLET, FILM COATED ORAL DAILY
Refills: 0 | Status: DISCONTINUED | OUTPATIENT
Start: 2023-01-10 | End: 2023-01-11

## 2023-01-10 RX ORDER — SODIUM CHLORIDE 9 MG/ML
250 INJECTION INTRAMUSCULAR; INTRAVENOUS; SUBCUTANEOUS ONCE
Refills: 0 | Status: COMPLETED | OUTPATIENT
Start: 2023-01-10 | End: 2023-01-10

## 2023-01-10 RX ORDER — HYDROCORTISONE 20 MG
200 TABLET ORAL ONCE
Refills: 0 | Status: COMPLETED | OUTPATIENT
Start: 2023-01-10 | End: 2023-01-10

## 2023-01-10 RX ORDER — DIPHENHYDRAMINE HCL 50 MG
50 CAPSULE ORAL ONCE
Refills: 0 | Status: COMPLETED | OUTPATIENT
Start: 2023-01-10 | End: 2023-01-10

## 2023-01-10 RX ORDER — ACETAMINOPHEN 500 MG
1000 TABLET ORAL ONCE
Refills: 0 | Status: COMPLETED | OUTPATIENT
Start: 2023-01-10 | End: 2023-01-10

## 2023-01-10 RX ADMIN — Medication 1000 MILLIGRAM(S): at 13:41

## 2023-01-10 RX ADMIN — ATORVASTATIN CALCIUM 20 MILLIGRAM(S): 80 TABLET, FILM COATED ORAL at 21:42

## 2023-01-10 RX ADMIN — ENOXAPARIN SODIUM 40 MILLIGRAM(S): 100 INJECTION SUBCUTANEOUS at 05:43

## 2023-01-10 RX ADMIN — FENTANYL CITRATE 12.5 MICROGRAM(S): 50 INJECTION INTRAVENOUS at 13:35

## 2023-01-10 RX ADMIN — Medication 400 MILLIGRAM(S): at 13:11

## 2023-01-10 RX ADMIN — CLOPIDOGREL BISULFATE 75 MILLIGRAM(S): 75 TABLET, FILM COATED ORAL at 14:17

## 2023-01-10 RX ADMIN — SODIUM CHLORIDE 75 MILLILITER(S): 9 INJECTION INTRAMUSCULAR; INTRAVENOUS; SUBCUTANEOUS at 10:49

## 2023-01-10 RX ADMIN — FENTANYL CITRATE 12.5 MICROGRAM(S): 50 INJECTION INTRAVENOUS at 14:05

## 2023-01-10 RX ADMIN — Medication 200 MILLIGRAM(S): at 11:17

## 2023-01-10 RX ADMIN — Medication 81 MILLIGRAM(S): at 09:41

## 2023-01-10 RX ADMIN — SODIUM CHLORIDE 750 MILLILITER(S): 9 INJECTION INTRAMUSCULAR; INTRAVENOUS; SUBCUTANEOUS at 09:42

## 2023-01-10 RX ADMIN — AMLODIPINE BESYLATE 5 MILLIGRAM(S): 2.5 TABLET ORAL at 05:42

## 2023-01-10 RX ADMIN — Medication 50 MILLIGRAM(S): at 11:17

## 2023-01-10 NOTE — PATIENT PROFILE ADULT - VISION (WITH CORRECTIVE LENSES IF THE PATIENT USUALLY WEARS THEM):
Partially impaired: cannot see medication labels or newsprint, but can see obstacles in path, and the surrounding layout; can count fingers at arm's length details…

## 2023-01-10 NOTE — PROGRESS NOTE ADULT - SUBJECTIVE AND OBJECTIVE BOX
DATE OF SERVICE: 01-10-23 @ 08:21    Subjective: Patient seen and examined. No new events except as noted.     SUBJECTIVE/ROS:  still with cp       MEDICATIONS:  MEDICATIONS  (STANDING):  amLODIPine   Tablet 5 milliGRAM(s) Oral daily  aspirin  chewable 81 milliGRAM(s) Oral daily  atorvastatin 20 milliGRAM(s) Oral at bedtime  enoxaparin Injectable 40 milliGRAM(s) SubCutaneous every 24 hours      PHYSICAL EXAM:  T(C): 37.1 (01-10-23 @ 05:41), Max: 37.1 (01-10-23 @ 05:41)  HR: 73 (01-10-23 @ 05:41) (67 - 76)  BP: 138/88 (01-10-23 @ 05:41) (112/69 - 138/88)  RR: 18 (01-10-23 @ 05:41) (16 - 18)  SpO2: 97% (01-10-23 @ 05:41) (97% - 99%)  Wt(kg): --  I&O's Summary          JVP: Normal  Neck: supple  Lung: clear   CV: S1 S2 , Murmur:  Abd: soft  Ext: No edema  neuro: Awake / alert  Psych: flat affect  Skin: normal``    LABS/DATA:  < from: Transthoracic Echocardiogram (01.09.23 @ 10:56) >  --------------------  Conclusions:  1. Normal left atrium.  LA volume index = 14 cc/m2.  2. Normal left ventricular internal dimensions and wall  thicknesses.  3. Hyperdynamic left ventricular systolic function. No  segmental wall motion abnormalities. LVEF calculated using  biplane Ortiz's method is 74%.  4. Normal diastolic function.    < end of copied text >    CARDIAC MARKERS:  CARDIAC MARKERS ( 10 Tal 2023 01:13 )  x     / x     / 70 U/L / x     / 1.5 ng/mL                                12.6   7.00  )-----------( 211      ( 09 Jan 2023 03:35 )             37.6     01-09    140  |  105  |  11  ----------------------------<  98  4.0   |  22  |  0.79    Ca    10.1      09 Jan 2023 03:35  Mg     2.3     01-09    TPro  7.4  /  Alb  4.2  /  TBili  0.3  /  DBili  x   /  AST  19  /  ALT  13  /  AlkPhos  125<H>  01-09    proBNP:   Lipid Profile:   HgA1c:   TSH:     TELE:  EKG:

## 2023-01-10 NOTE — PROGRESS NOTE ADULT - SUBJECTIVE AND OBJECTIVE BOX
Patient is a 72y old  Female who presents with a chief complaint of chest pain (10 Tal 2023 08:21)    Date of servie : 01-10-23 @ 15:43  INTERVAL HPI/OVERNIGHT EVENTS:  T(C): 36.5 (01-10-23 @ 12:45), Max: 37.1 (01-10-23 @ 05:41)  HR: 68 (01-10-23 @ 15:15) (64 - 76)  BP: 124/64 (01-10-23 @ 15:15) (99/53 - 144/73)  RR: 16 (01-10-23 @ 14:45) (16 - 18)  SpO2: 100% (01-10-23 @ 15:15) (97% - 100%)  Wt(kg): --  I&O's Summary      LABS:                        12.6   7.00  )-----------( 211      ( 2023 03:35 )             37.6         140  |  105  |  11  ----------------------------<  98  4.0   |  22  |  0.79    Ca    10.1      2023 03:35  Mg     2.3         TPro  7.4  /  Alb  4.2  /  TBili  0.3  /  DBili  x   /  AST  19  /  ALT  13  /  AlkPhos  125<H>      PT/INR - ( 2023 03:35 )   PT: 13.7 sec;   INR: 1.19 ratio         PTT - ( 2023 03:35 )  PTT:39.5 sec  Urinalysis Basic - ( 10 Tal 2023 07:45 )    Color: Light Yellow / Appearance: Clear / S.016 / pH: x  Gluc: x / Ketone: Negative  / Bili: Negative / Urobili: Negative   Blood: x / Protein: Negative / Nitrite: Negative   Leuk Esterase: Moderate / RBC: 1 /hpf / WBC 5 /HPF   Sq Epi: x / Non Sq Epi: 0 /hpf / Bacteria: Negative      CAPILLARY BLOOD GLUCOSE            Urinalysis Basic - ( 10 Tal 2023 07:45 )    Color: Light Yellow / Appearance: Clear / S.016 / pH: x  Gluc: x / Ketone: Negative  / Bili: Negative / Urobili: Negative   Blood: x / Protein: Negative / Nitrite: Negative   Leuk Esterase: Moderate / RBC: 1 /hpf / WBC 5 /HPF   Sq Epi: x / Non Sq Epi: 0 /hpf / Bacteria: Negative        MEDICATIONS  (STANDING):  amLODIPine   Tablet 5 milliGRAM(s) Oral daily  aspirin  chewable 81 milliGRAM(s) Oral daily  atorvastatin 20 milliGRAM(s) Oral at bedtime  clopidogrel Tablet 75 milliGRAM(s) Oral daily  enoxaparin Injectable 40 milliGRAM(s) SubCutaneous every 24 hours  sodium chloride 0.9%. 1000 milliLiter(s) (75 mL/Hr) IV Continuous <Continuous>    MEDICATIONS  (PRN):  fentaNYL    Injectable 12.5 MICROGram(s) IV Push every 15 minutes PRN Severe Pain (7 - 10)          PHYSICAL EXAM:  GENERAL: NAD, well-groomed, well-developed  HEAD:  Atraumatic, Normocephalic  CHEST/LUNG: Clear to percussion bilaterally; No rales, rhonchi, wheezing, or rubs  HEART: Regular rate and rhythm; No murmurs, rubs, or gallops  ABDOMEN: Soft, Nontender, Nondistended; Bowel sounds present  EXTREMITIES:  2+ Peripheral Pulses, No clubbing, cyanosis, or edema  LYMPH: No lymphadenopathy noted  SKIN: No rashes or lesions    Care Discussed with Consultants/Other Providers [ ] YES  [ ] NO

## 2023-01-10 NOTE — PROGRESS NOTE ADULT - ASSESSMENT
Chest pain   no evidence of acute MI   abnormal stress test   will get cath     HTN  stable  cont current meds

## 2023-01-10 NOTE — PROGRESS NOTE ADULT - ASSESSMENT
72-year-old female, past medical history HTN and HLD, presents to ED complaining of intermittent, midsternal, pressure-like, exertional, chest pain x2 days associated with dyspnea on exertion.  Patient has never had similar pain in the past.  Patient has not received stress/echo in many years.  Patient denies fever/chills, abdominal pain, diaphoresis, urinary symptoms, weakness/numbness, lightheadedness or dizziness.    1 Chest pain  - telemonitor  - cards fu   - check stress test and echo  - asa. statin  - will monitor    2 HTN  - cw amlodiine    3 HLD  - cw statin     72-year-old female, past medical history HTN and HLD, presents to ED complaining of intermittent, midsternal, pressure-like, exertional, chest pain x2 days associated with dyspnea on exertion.  Patient has never had similar pain in the past.  Patient has not received stress/echo in many years.  Patient denies fever/chills, abdominal pain, diaphoresis, urinary symptoms, weakness/numbness, lightheadedness or dizziness.    1 Chest pain  - telemonitor  - cards fu   - sp cath and PCI   - asa. statin  - will monitor    2 HTN  - cw amlodiine    3 HLD  - cw statin

## 2023-01-11 ENCOUNTER — TRANSCRIPTION ENCOUNTER (OUTPATIENT)
Age: 73
End: 2023-01-11

## 2023-01-11 VITALS
DIASTOLIC BLOOD PRESSURE: 58 MMHG | HEART RATE: 69 BPM | TEMPERATURE: 98 F | RESPIRATION RATE: 18 BRPM | OXYGEN SATURATION: 98 % | SYSTOLIC BLOOD PRESSURE: 128 MMHG

## 2023-01-11 LAB
ANION GAP SERPL CALC-SCNC: 12 MMOL/L — SIGNIFICANT CHANGE UP (ref 5–17)
APPEARANCE UR: CLEAR — SIGNIFICANT CHANGE UP
BACTERIA # UR AUTO: NEGATIVE — SIGNIFICANT CHANGE UP
BILIRUB UR-MCNC: NEGATIVE — SIGNIFICANT CHANGE UP
BUN SERPL-MCNC: 16 MG/DL — SIGNIFICANT CHANGE UP (ref 7–23)
CALCIUM SERPL-MCNC: 9.8 MG/DL — SIGNIFICANT CHANGE UP (ref 8.4–10.5)
CHLORIDE SERPL-SCNC: 107 MMOL/L — SIGNIFICANT CHANGE UP (ref 96–108)
CHOLEST SERPL-MCNC: 251 MG/DL — HIGH
CO2 SERPL-SCNC: 20 MMOL/L — LOW (ref 22–31)
COLOR SPEC: COLORLESS — SIGNIFICANT CHANGE UP
CREAT SERPL-MCNC: 0.83 MG/DL — SIGNIFICANT CHANGE UP (ref 0.5–1.3)
DIFF PNL FLD: NEGATIVE — SIGNIFICANT CHANGE UP
EGFR: 75 ML/MIN/1.73M2 — SIGNIFICANT CHANGE UP
EPI CELLS # UR: 2 /HPF — SIGNIFICANT CHANGE UP
GLUCOSE SERPL-MCNC: 100 MG/DL — HIGH (ref 70–99)
GLUCOSE UR QL: NEGATIVE — SIGNIFICANT CHANGE UP
HCT VFR BLD CALC: 38.2 % — SIGNIFICANT CHANGE UP (ref 34.5–45)
HCV AB S/CO SERPL IA: 0.1 S/CO — SIGNIFICANT CHANGE UP (ref 0–0.99)
HCV AB SERPL-IMP: SIGNIFICANT CHANGE UP
HDLC SERPL-MCNC: 45 MG/DL — LOW
HGB BLD-MCNC: 12.7 G/DL — SIGNIFICANT CHANGE UP (ref 11.5–15.5)
HYALINE CASTS # UR AUTO: 0 /LPF — SIGNIFICANT CHANGE UP (ref 0–2)
KETONES UR-MCNC: NEGATIVE — SIGNIFICANT CHANGE UP
LEUKOCYTE ESTERASE UR-ACNC: ABNORMAL
LIPID PNL WITH DIRECT LDL SERPL: 169 MG/DL — HIGH
MAGNESIUM SERPL-MCNC: 2.1 MG/DL — SIGNIFICANT CHANGE UP (ref 1.6–2.6)
MCHC RBC-ENTMCNC: 28.9 PG — SIGNIFICANT CHANGE UP (ref 27–34)
MCHC RBC-ENTMCNC: 33.2 GM/DL — SIGNIFICANT CHANGE UP (ref 32–36)
MCV RBC AUTO: 86.8 FL — SIGNIFICANT CHANGE UP (ref 80–100)
NITRITE UR-MCNC: NEGATIVE — SIGNIFICANT CHANGE UP
NON HDL CHOLESTEROL: 206 MG/DL — HIGH
NRBC # BLD: 0 /100 WBCS — SIGNIFICANT CHANGE UP (ref 0–0)
PH UR: 6 — SIGNIFICANT CHANGE UP (ref 5–8)
PLATELET # BLD AUTO: 224 K/UL — SIGNIFICANT CHANGE UP (ref 150–400)
POTASSIUM SERPL-MCNC: 4 MMOL/L — SIGNIFICANT CHANGE UP (ref 3.5–5.3)
POTASSIUM SERPL-SCNC: 4 MMOL/L — SIGNIFICANT CHANGE UP (ref 3.5–5.3)
PROT UR-MCNC: NEGATIVE — SIGNIFICANT CHANGE UP
RBC # BLD: 4.4 M/UL — SIGNIFICANT CHANGE UP (ref 3.8–5.2)
RBC # FLD: 12.9 % — SIGNIFICANT CHANGE UP (ref 10.3–14.5)
RBC CASTS # UR COMP ASSIST: 0 /HPF — SIGNIFICANT CHANGE UP (ref 0–4)
SODIUM SERPL-SCNC: 139 MMOL/L — SIGNIFICANT CHANGE UP (ref 135–145)
SP GR SPEC: 1.01 — LOW (ref 1.01–1.02)
TRIGL SERPL-MCNC: 186 MG/DL — HIGH
UROBILINOGEN FLD QL: NEGATIVE — SIGNIFICANT CHANGE UP
WBC # BLD: 10.49 K/UL — SIGNIFICANT CHANGE UP (ref 3.8–10.5)
WBC # FLD AUTO: 10.49 K/UL — SIGNIFICANT CHANGE UP (ref 3.8–10.5)
WBC UR QL: 20 /HPF — HIGH (ref 0–5)

## 2023-01-11 PROCEDURE — 93017 CV STRESS TEST TRACING ONLY: CPT

## 2023-01-11 PROCEDURE — 93005 ELECTROCARDIOGRAM TRACING: CPT

## 2023-01-11 PROCEDURE — C1874: CPT

## 2023-01-11 PROCEDURE — C1887: CPT

## 2023-01-11 PROCEDURE — 83880 ASSAY OF NATRIURETIC PEPTIDE: CPT

## 2023-01-11 PROCEDURE — A9500: CPT

## 2023-01-11 PROCEDURE — 85027 COMPLETE CBC AUTOMATED: CPT

## 2023-01-11 PROCEDURE — 86803 HEPATITIS C AB TEST: CPT

## 2023-01-11 PROCEDURE — C9600: CPT | Mod: LD

## 2023-01-11 PROCEDURE — 82553 CREATINE MB FRACTION: CPT

## 2023-01-11 PROCEDURE — 84484 ASSAY OF TROPONIN QUANT: CPT

## 2023-01-11 PROCEDURE — 80048 BASIC METABOLIC PNL TOTAL CA: CPT

## 2023-01-11 PROCEDURE — 85610 PROTHROMBIN TIME: CPT

## 2023-01-11 PROCEDURE — 93458 L HRT ARTERY/VENTRICLE ANGIO: CPT | Mod: 59

## 2023-01-11 PROCEDURE — 93306 TTE W/DOPPLER COMPLETE: CPT

## 2023-01-11 PROCEDURE — 93799 UNLISTED CV SVC/PROCEDURE: CPT | Mod: LC

## 2023-01-11 PROCEDURE — 80061 LIPID PANEL: CPT

## 2023-01-11 PROCEDURE — 71046 X-RAY EXAM CHEST 2 VIEWS: CPT

## 2023-01-11 PROCEDURE — 83735 ASSAY OF MAGNESIUM: CPT

## 2023-01-11 PROCEDURE — C1769: CPT

## 2023-01-11 PROCEDURE — 83690 ASSAY OF LIPASE: CPT

## 2023-01-11 PROCEDURE — C1725: CPT

## 2023-01-11 PROCEDURE — 85730 THROMBOPLASTIN TIME PARTIAL: CPT

## 2023-01-11 PROCEDURE — 85025 COMPLETE CBC W/AUTO DIFF WBC: CPT

## 2023-01-11 PROCEDURE — 82550 ASSAY OF CK (CPK): CPT

## 2023-01-11 PROCEDURE — 78452 HT MUSCLE IMAGE SPECT MULT: CPT

## 2023-01-11 PROCEDURE — C1753: CPT

## 2023-01-11 PROCEDURE — 87637 SARSCOV2&INF A&B&RSV AMP PRB: CPT

## 2023-01-11 PROCEDURE — 87086 URINE CULTURE/COLONY COUNT: CPT

## 2023-01-11 PROCEDURE — 99285 EMERGENCY DEPT VISIT HI MDM: CPT

## 2023-01-11 PROCEDURE — 36415 COLL VENOUS BLD VENIPUNCTURE: CPT

## 2023-01-11 PROCEDURE — C1894: CPT

## 2023-01-11 PROCEDURE — 81001 URINALYSIS AUTO W/SCOPE: CPT

## 2023-01-11 PROCEDURE — 92978 ENDOLUMINL IVUS OCT C 1ST: CPT | Mod: LD

## 2023-01-11 PROCEDURE — 80053 COMPREHEN METABOLIC PANEL: CPT

## 2023-01-11 RX ORDER — ASPIRIN/CALCIUM CARB/MAGNESIUM 324 MG
1 TABLET ORAL
Qty: 90 | Refills: 3
Start: 2023-01-11 | End: 2024-01-05

## 2023-01-11 RX ORDER — CEPHALEXIN 500 MG
250 CAPSULE ORAL EVERY 6 HOURS
Refills: 0 | Status: DISCONTINUED | OUTPATIENT
Start: 2023-01-11 | End: 2023-01-11

## 2023-01-11 RX ORDER — ACETAMINOPHEN 500 MG
650 TABLET ORAL EVERY 6 HOURS
Refills: 0 | Status: DISCONTINUED | OUTPATIENT
Start: 2023-01-11 | End: 2023-01-11

## 2023-01-11 RX ORDER — ATORVASTATIN CALCIUM 80 MG/1
1 TABLET, FILM COATED ORAL
Qty: 90 | Refills: 3
Start: 2023-01-11 | End: 2024-01-05

## 2023-01-11 RX ORDER — CEPHALEXIN 500 MG
1 CAPSULE ORAL
Qty: 20 | Refills: 0
Start: 2023-01-11 | End: 2023-01-15

## 2023-01-11 RX ORDER — CLOPIDOGREL BISULFATE 75 MG/1
1 TABLET, FILM COATED ORAL
Qty: 90 | Refills: 3
Start: 2023-01-11 | End: 2024-01-05

## 2023-01-11 RX ORDER — LABETALOL HCL 100 MG
10 TABLET ORAL ONCE
Refills: 0 | Status: COMPLETED | OUTPATIENT
Start: 2023-01-11 | End: 2023-01-11

## 2023-01-11 RX ADMIN — Medication 650 MILLIGRAM(S): at 05:49

## 2023-01-11 RX ADMIN — Medication 250 MILLIGRAM(S): at 10:05

## 2023-01-11 RX ADMIN — CLOPIDOGREL BISULFATE 75 MILLIGRAM(S): 75 TABLET, FILM COATED ORAL at 06:36

## 2023-01-11 RX ADMIN — ENOXAPARIN SODIUM 40 MILLIGRAM(S): 100 INJECTION SUBCUTANEOUS at 06:36

## 2023-01-11 RX ADMIN — Medication 650 MILLIGRAM(S): at 05:19

## 2023-01-11 RX ADMIN — AMLODIPINE BESYLATE 5 MILLIGRAM(S): 2.5 TABLET ORAL at 10:05

## 2023-01-11 RX ADMIN — Medication 81 MILLIGRAM(S): at 06:36

## 2023-01-11 RX ADMIN — Medication 10 MILLIGRAM(S): at 01:05

## 2023-01-11 NOTE — PROGRESS NOTE ADULT - SUBJECTIVE AND OBJECTIVE BOX
DATE OF SERVICE: 01-11-23 @ 08:31    Subjective: Patient seen and examined. No new events except as noted.     SUBJECTIVE/ROS:  No chest pain, dyspnea, palpitation, or dizziness.       MEDICATIONS:  MEDICATIONS  (STANDING):  amLODIPine   Tablet 5 milliGRAM(s) Oral daily  aspirin  chewable 81 milliGRAM(s) Oral daily  atorvastatin 20 milliGRAM(s) Oral at bedtime  clopidogrel Tablet 75 milliGRAM(s) Oral daily  enoxaparin Injectable 40 milliGRAM(s) SubCutaneous every 24 hours  sodium chloride 0.9%. 1000 milliLiter(s) (75 mL/Hr) IV Continuous <Continuous>      PHYSICAL EXAM:  T(C): 36.8 (01-11-23 @ 07:55), Max: 36.8 (01-11-23 @ 07:55)  HR: 69 (01-11-23 @ 07:55) (64 - 92)  BP: 123/54 (01-11-23 @ 07:55) (99/53 - 151/64)  RR: 17 (01-11-23 @ 07:55) (16 - 18)  SpO2: 99% (01-11-23 @ 07:55) (94% - 100%)  Wt(kg): --  I&O's Summary    10 Tal 2023 07:01  -  11 Jan 2023 07:00  --------------------------------------------------------  IN: 0 mL / OUT: 700 mL / NET: -700 mL    11 Jan 2023 07:01  -  11 Jan 2023 08:31  --------------------------------------------------------  IN: 240 mL / OUT: 0 mL / NET: 240 mL            JVP: Normal  Neck: supple  Lung: clear   CV: S1 S2 , Murmur:  Abd: soft  Ext: No edema  neuro: Awake / alert  Psych: flat affect  Skin: normal``    LABS/DATA:    CARDIAC MARKERS:  CARDIAC MARKERS ( 10 Tal 2023 01:13 )  x     / x     / 70 U/L / x     / 1.5 ng/mL                                12.7   10.49 )-----------( 224      ( 11 Jan 2023 01:40 )             38.2     01-11    139  |  107  |  16  ----------------------------<  100<H>  4.0   |  20<L>  |  0.83    Ca    9.8      11 Jan 2023 01:40  Mg     2.1     01-11      proBNP:   Lipid Profile:   HgA1c:   TSH:     TELE:  EKG:

## 2023-01-11 NOTE — CHART NOTE - NSCHARTNOTEFT_GEN_A_CORE
s/p PCI/TITUS x 1 ostial LAD 90% and mDiag TITUS x 1 80%; mCx IFR - for 60%; mild RCA via RRA     Received in [facility] bed #10 with chest pain 8/10 pressure like down from 10/10 during procedure, a/w right and left arm pain.      Post ECG SR with PAC @71 with no acute ischemic changes     Vital Signs Last 24 Hrs  T(C): 36.5 (10 Tal 2023 12:45), Max: 37.1 (10 Tal 2023 05:41)  T(F): 97.7 (10 Tal 2023 12:45), Max: 98.7 (10 Tal 2023 05:41)  HR: 66 (10 Tal 2023 13:15) (64 - 76)  BP: 111/57 (10 Tal 2023 13:15) (99/53 - 144/73)  BP(mean): 74 (10 Tal 2023 13:15) (68 - 91)  RR: 16 (10 Tal 2023 13:15) (16 - 18)  SpO2: 100% (10 Tal 2023 13:15) (97% - 100%)    Parameters below as of 10 Tal 2023 14:15  Patient On (Oxygen Delivery Method): room air    PHYSICAL EXAM:    Constitutional: NAD, awake and alert, well-developed  HEENT: Moist Mucous Membranes, Anicteric  Pulmonary: Non-labored, breath sounds are clear bilaterally, No wheezing, rales or rhonchi  Cardiovascular: Regular, S1 and S2, No murmurs, rubs, gallops or clicks  Gastrointestinal: Bowel Sounds present, soft, nontender.   Lymph: No peripheral edema. No lymphadenopathy.  Access site: RRA site stable with +2 radial/Ulna pulses  Skin: No visible rashes or ulcers.  Psych:  Mood & affect appropriate      HPI:  72-year-old female, past medical history HTN and HLD, presents to ED complaining of intermittent, midsternal, pressure-like, exertional, chest pain x2 days associated with dyspnea on exertion.  Patient has never had similar pain in the past.  Patient has not received stress/echo in many years.  Patient denies fever/chills, abdominal pain, diaphoresis, urinary symptoms, weakness/numbness, lightheadedness or dizziness now s/p PCI with stents to the LAD and mDiag via RRA    Plan:  - Tylenol 1gram IV x 1  - Fentanyl 12.5mg IV x 2 q 15min apart for pain if SBP >105 for chest pain  - No IVF EDP elevated to 28  - ASA/Plavix and statin  - Monitor tele and reevaluate chest pain after being treated  - Neurovascular and site checks as per protocol  - Continue to monitor closely    SHLOMO Duff-BC  Cardiology
pt on 1/9 s/p PCI/TITUS x 1 ostial LAD 90% and mDiag TITUS x 1 80%; mCx IFR - for 60%; mild RCA via RRA with chest pain 8/10 and right and left arm pain.  RRA site stable with +2 radial/Ulna pulses.  c/o Chills & headache Urinalysis reveals UTi case d/w Dr Garcia will place pt on antibiotics and Urine cx will be f/u up by Dr Garcia pln to discharge pt home.

## 2023-01-11 NOTE — PROVIDER CONTACT NOTE (OTHER) - BACKGROUND
pt s/p LHC with PCI x2 via right wrist; pt states she has had body tremors in the past which are associated with hypertension

## 2023-01-11 NOTE — DISCHARGE NOTE NURSING/CASE MANAGEMENT/SOCIAL WORK - NSDCPEFALRISK_GEN_ALL_CORE
For information on Fall & Injury Prevention, visit: [email or web address] OR  [email or web address] OR  https://www.cdc.gov/steadi/patient.html

## 2023-01-11 NOTE — PROVIDER CONTACT NOTE (OTHER) - ASSESSMENT
pt a/ox4; blood pressure 151/64; pt denies pain, sob, palpitations; pt denies numbness or tingling of extremities. pt states she has discomfort while voiding

## 2023-01-11 NOTE — PROGRESS NOTE ADULT - ASSESSMENT
Chest pain   abn cath   s/p stent to LAD  DAPT  statin   increase lipitor to 80 daily     HTN  stable  cont current meds    outpt follow up in one week

## 2023-01-11 NOTE — DISCHARGE NOTE NURSING/CASE MANAGEMENT/SOCIAL WORK - PATIENT PORTAL LINK FT
You can access the FollowMyHealth Patient Portal offered by [facility] by registering at the following website: [email or web address]. By joining [employer]’s FollowMyHealth portal, you will also be able to view your health information using other applications (apps) compatible with our system.

## 2023-01-12 LAB
CULTURE RESULTS: SIGNIFICANT CHANGE UP
SPECIMEN SOURCE: SIGNIFICANT CHANGE UP

## 2023-01-17 NOTE — ED PROVIDER NOTE - NS CPE EDP EYE EXAM LEFT DETAIL
At [facility], one important tool we use to improve our patient services is our Patient Survey.  Following your visit you may receive our survey in the mail.    Please take the time to complete the survey.    If your visit with us was great, we want to hear about it.    If we can improve, please let us know how.       Get your Prescription filled at [facility]!    [facility] uses the same medical record your doctor uses. More accurate and timely information for your doctor and your pharmacy means more effective and safer health care for you and your family.  [facility] accepts all of the major insurance plans which means you pay the same copay wherever you go.  [facility]ists take the time to explain your medication regimen to you.  [facility] will mail your medications to you free of postage and handling charges. We love lewis.       
uncorrected

## 2023-01-19 ENCOUNTER — APPOINTMENT (OUTPATIENT)
Dept: CARDIOLOGY | Facility: CLINIC | Age: 73
End: 2023-01-19
Payer: MEDICARE

## 2023-01-19 ENCOUNTER — NON-APPOINTMENT (OUTPATIENT)
Age: 73
End: 2023-01-19

## 2023-01-19 ENCOUNTER — TRANSCRIPTION ENCOUNTER (OUTPATIENT)
Age: 73
End: 2023-01-19

## 2023-01-19 VITALS
HEART RATE: 76 BPM | RESPIRATION RATE: 12 BRPM | SYSTOLIC BLOOD PRESSURE: 153 MMHG | DIASTOLIC BLOOD PRESSURE: 79 MMHG | OXYGEN SATURATION: 98 % | WEIGHT: 133 LBS | TEMPERATURE: 97.2 F | HEIGHT: 63 IN | BODY MASS INDEX: 23.57 KG/M2

## 2023-01-19 DIAGNOSIS — Z78.9 OTHER SPECIFIED HEALTH STATUS: ICD-10-CM

## 2023-01-19 PROCEDURE — 99204 OFFICE O/P NEW MOD 45 MIN: CPT

## 2023-01-19 PROCEDURE — 93000 ELECTROCARDIOGRAM COMPLETE: CPT

## 2023-01-20 PROBLEM — I10 ESSENTIAL (PRIMARY) HYPERTENSION: Chronic | Status: ACTIVE | Noted: 2023-01-09

## 2023-01-20 PROBLEM — E78.5 HYPERLIPIDEMIA, UNSPECIFIED: Chronic | Status: ACTIVE | Noted: 2023-01-09

## 2023-01-20 NOTE — REVIEW OF SYSTEMS
[SOB] : no shortness of breath [Dyspnea on exertion] : not dyspnea during exertion [Chest Discomfort] : no chest discomfort [Lower Ext Edema] : no extremity edema [Palpitations] : no palpitations [Nausea] : nausea [Negative] : Heme/Lymph

## 2023-01-20 NOTE — DISCUSSION/SUMMARY
[FreeTextEntry1] : The patient is a 72-year-old female HTN< HLD, CAD s/p LAD stent with persistent nausea and HA most likely secondary to HTN.\par #1 CV- c/w DAPT, right radial hematoma, warm compress bid, moderate Cx disease as well, EF=70%\par #2 HTN- c/w amlodipine 5mg but bid and call next week with BP and symptoms, if no better will add HCTZ and possibly ARB\par #3 HLD- c/w atorvastatin 20mg \par #4 GI- is nausea does not resolve then refer to GI\par #5 General- encouraged to start walking for exercise. \par Records reviewed. [EKG obtained to assist in diagnosis and management of assessed problem(s)] : EKG obtained to assist in diagnosis and management of assessed problem(s)

## 2023-01-20 NOTE — HISTORY OF PRESENT ILLNESS
[FreeTextEntry1] : Fatmata is a 72-year-old female HTN, HLD s/p LAD stent for angina. In December went to ER with malaise, nausea and headache and sent home. In January similar thing happened but went to NS. ECHO and stress test then cath with 2 stents to LAD.  One week later but still feels poorly. Heart still racing. Head throbbing. BP elevated and she took extra amlodipine the other day by mistake and felt better.

## 2023-02-09 ENCOUNTER — INPATIENT (INPATIENT)
Facility: HOSPITAL | Age: 73
LOS: 0 days | Discharge: HOME | DRG: 287 | End: 2023-02-10
Attending: INTERNAL MEDICINE | Admitting: INTERNAL MEDICINE
Payer: COMMERCIAL

## 2023-02-09 VITALS
HEART RATE: 96 BPM | RESPIRATION RATE: 20 BRPM | TEMPERATURE: 98 F | SYSTOLIC BLOOD PRESSURE: 139 MMHG | DIASTOLIC BLOOD PRESSURE: 79 MMHG | WEIGHT: 132.06 LBS | OXYGEN SATURATION: 98 % | HEIGHT: 64 IN

## 2023-02-09 LAB
ALBUMIN SERPL ELPH-MCNC: 4.5 G/DL — SIGNIFICANT CHANGE UP (ref 3.3–5)
ALP SERPL-CCNC: 171 U/L — HIGH (ref 40–120)
ALT FLD-CCNC: 33 U/L — SIGNIFICANT CHANGE UP (ref 10–45)
ANION GAP SERPL CALC-SCNC: 15 MMOL/L — SIGNIFICANT CHANGE UP (ref 5–17)
AST SERPL-CCNC: 26 U/L — SIGNIFICANT CHANGE UP (ref 10–40)
BASE EXCESS BLDV CALC-SCNC: -0.5 MMOL/L — SIGNIFICANT CHANGE UP (ref -2–3)
BASOPHILS # BLD AUTO: 0.09 K/UL — SIGNIFICANT CHANGE UP (ref 0–0.2)
BASOPHILS NFR BLD AUTO: 1 % — SIGNIFICANT CHANGE UP (ref 0–2)
BILIRUB SERPL-MCNC: 0.4 MG/DL — SIGNIFICANT CHANGE UP (ref 0.2–1.2)
BUN SERPL-MCNC: 16 MG/DL — SIGNIFICANT CHANGE UP (ref 7–23)
CA-I SERPL-SCNC: 1.25 MMOL/L — SIGNIFICANT CHANGE UP (ref 1.15–1.33)
CALCIUM SERPL-MCNC: 10.1 MG/DL — SIGNIFICANT CHANGE UP (ref 8.4–10.5)
CHLORIDE BLDV-SCNC: 106 MMOL/L — SIGNIFICANT CHANGE UP (ref 96–108)
CHLORIDE SERPL-SCNC: 106 MMOL/L — SIGNIFICANT CHANGE UP (ref 96–108)
CO2 BLDV-SCNC: 25 MMOL/L — SIGNIFICANT CHANGE UP (ref 22–26)
CO2 SERPL-SCNC: 20 MMOL/L — LOW (ref 22–31)
CREAT SERPL-MCNC: 0.74 MG/DL — SIGNIFICANT CHANGE UP (ref 0.5–1.3)
EGFR: 86 ML/MIN/1.73M2 — SIGNIFICANT CHANGE UP
EOSINOPHIL # BLD AUTO: 0.17 K/UL — SIGNIFICANT CHANGE UP (ref 0–0.5)
EOSINOPHIL NFR BLD AUTO: 1.8 % — SIGNIFICANT CHANGE UP (ref 0–6)
GAS PNL BLDV: 138 MMOL/L — SIGNIFICANT CHANGE UP (ref 136–145)
GAS PNL BLDV: SIGNIFICANT CHANGE UP
GAS PNL BLDV: SIGNIFICANT CHANGE UP
GLUCOSE BLDV-MCNC: 106 MG/DL — HIGH (ref 70–99)
GLUCOSE SERPL-MCNC: 108 MG/DL — HIGH (ref 70–99)
HCO3 BLDV-SCNC: 24 MMOL/L — SIGNIFICANT CHANGE UP (ref 22–29)
HCT VFR BLD CALC: 39.7 % — SIGNIFICANT CHANGE UP (ref 34.5–45)
HCT VFR BLDA CALC: 41 % — SIGNIFICANT CHANGE UP (ref 34.5–46.5)
HGB BLD CALC-MCNC: 13.7 G/DL — SIGNIFICANT CHANGE UP (ref 11.7–16.1)
HGB BLD-MCNC: 13.5 G/DL — SIGNIFICANT CHANGE UP (ref 11.5–15.5)
IMM GRANULOCYTES NFR BLD AUTO: 0.2 % — SIGNIFICANT CHANGE UP (ref 0–0.9)
LACTATE BLDV-MCNC: 0.9 MMOL/L — SIGNIFICANT CHANGE UP (ref 0.5–2)
LYMPHOCYTES # BLD AUTO: 2.47 K/UL — SIGNIFICANT CHANGE UP (ref 1–3.3)
LYMPHOCYTES # BLD AUTO: 26.7 % — SIGNIFICANT CHANGE UP (ref 13–44)
MCHC RBC-ENTMCNC: 29.8 PG — SIGNIFICANT CHANGE UP (ref 27–34)
MCHC RBC-ENTMCNC: 34 GM/DL — SIGNIFICANT CHANGE UP (ref 32–36)
MCV RBC AUTO: 87.6 FL — SIGNIFICANT CHANGE UP (ref 80–100)
MONOCYTES # BLD AUTO: 0.51 K/UL — SIGNIFICANT CHANGE UP (ref 0–0.9)
MONOCYTES NFR BLD AUTO: 5.5 % — SIGNIFICANT CHANGE UP (ref 2–14)
NEUTROPHILS # BLD AUTO: 6 K/UL — SIGNIFICANT CHANGE UP (ref 1.8–7.4)
NEUTROPHILS NFR BLD AUTO: 64.8 % — SIGNIFICANT CHANGE UP (ref 43–77)
NRBC # BLD: 0 /100 WBCS — SIGNIFICANT CHANGE UP (ref 0–0)
NT-PROBNP SERPL-SCNC: 77 PG/ML — SIGNIFICANT CHANGE UP (ref 0–300)
PCO2 BLDV: 39 MMHG — SIGNIFICANT CHANGE UP (ref 39–42)
PH BLDV: 7.4 — SIGNIFICANT CHANGE UP (ref 7.32–7.43)
PLATELET # BLD AUTO: 239 K/UL — SIGNIFICANT CHANGE UP (ref 150–400)
PO2 BLDV: 53 MMHG — HIGH (ref 25–45)
POTASSIUM BLDV-SCNC: 3.5 MMOL/L — SIGNIFICANT CHANGE UP (ref 3.5–5.1)
POTASSIUM SERPL-MCNC: 3.7 MMOL/L — SIGNIFICANT CHANGE UP (ref 3.5–5.3)
POTASSIUM SERPL-SCNC: 3.7 MMOL/L — SIGNIFICANT CHANGE UP (ref 3.5–5.3)
PROT SERPL-MCNC: 8.4 G/DL — HIGH (ref 6–8.3)
RBC # BLD: 4.53 M/UL — SIGNIFICANT CHANGE UP (ref 3.8–5.2)
RBC # FLD: 13.2 % — SIGNIFICANT CHANGE UP (ref 10.3–14.5)
SAO2 % BLDV: 85.5 % — SIGNIFICANT CHANGE UP (ref 67–88)
SODIUM SERPL-SCNC: 141 MMOL/L — SIGNIFICANT CHANGE UP (ref 135–145)
TROPONIN T, HIGH SENSITIVITY RESULT: <6 NG/L — SIGNIFICANT CHANGE UP (ref 0–51)
WBC # BLD: 9.26 K/UL — SIGNIFICANT CHANGE UP (ref 3.8–10.5)
WBC # FLD AUTO: 9.26 K/UL — SIGNIFICANT CHANGE UP (ref 3.8–10.5)

## 2023-02-09 PROCEDURE — 71046 X-RAY EXAM CHEST 2 VIEWS: CPT | Mod: 26

## 2023-02-09 PROCEDURE — 99285 EMERGENCY DEPT VISIT HI MDM: CPT | Mod: CS,GC

## 2023-02-09 NOTE — ED PROVIDER NOTE - OBJECTIVE STATEMENT
Patient is a 72y F PMHx HLD, CAD (s/p 2 stents Jan 2023 on ASA, plavix, compliant), HTN (on amlodipine), p/w CP. Patient states CP about 1 week, midline sternal down to upper abdomen, feels more like a pressure than pain, is worse with exertion, sometimes associated with SOB. Is SOB after walking up 9 stairs at home. Denies association with eating, nvd, abdominal pain, cough. Stents by Dr. Nguyen at [facility].

## 2023-02-09 NOTE — ED PROVIDER NOTE - RAPID ASSESSMENT
72yr F hx of CAD s/p coronary stent x2 last month on asa and plavix (has been compliant) p/w CP in lower chest, associated with SOB, dizziness, and feeling unwell for several days. reports no fever chills, cough, rhinorrhea, diarrhea, vomiting, but had nausea occasionally. denies leg swelling. on exam no distress, clear lungs, S1-2, mild tachy. concern for ACS will expedite work up including labs, cxr and placement in main ED. EKG reviewed and grossly unchanged from prior.      I have seen and provided a rapid assessment for this patient in the triage area, I agree with the above. Shant Matson MD

## 2023-02-09 NOTE — ED PROVIDER NOTE - PHYSICAL EXAMINATION
GENERAL: no acute distress, non-toxic appearing  HEENT: PERRLA, EOMI, normal conjunctiva, oral mucosa moist  CARDIAC: regular rate and rhythm, no appreciable murmurs  PULM: clear to ascultation bilaterally, no crackles, rales, rhonchi, or wheezing  GI: abdomen nondistended, soft, nontender  : no suprapubic tenderness  NEURO: alert and oriented x 3, normal speech, no gross neurologic deficit  MSK: no visible deformities, no peripheral edema, calf tenderness/redness/swelling  SKIN: no visible rashes, dry, well-perfused  PSYCH: appropriate mood and affect

## 2023-02-09 NOTE — ED PROVIDER NOTE - PROGRESS NOTE DETAILS
Penny Bradshaw MD PGY2: trop <6, EKG without ST elevations, cardiology consulted given CP in setting of recent stent placement. Penny Bradshaw MD PGY2: Cardiology rec nuc stress in AM. Admit Dr. Nguyen

## 2023-02-09 NOTE — ED PROVIDER NOTE - ATTENDING CONTRIBUTION TO CARE
71 yo female p/w CP and REYES.  significant cardiac history including recent stenting here, saw both Isabel Polk and John Davis post-cath as outpatient.  EKG without STEMI.  check CBC., CMP, cardiac enzymes.  d/w cardiology --> admit for further management.

## 2023-02-09 NOTE — ED PROVIDER NOTE - EKG ADDITIONAL QUESTION - PERFORMED INDEPENDENT VISUALIZATION
Implemented All Universal Safety Interventions:  Arnett to call system. Call bell, personal items and telephone within reach. Instruct patient to call for assistance. Room bathroom lighting operational. Non-slip footwear when patient is off stretcher. Physically safe environment: no spills, clutter or unnecessary equipment. Stretcher in lowest position, wheels locked, appropriate side rails in place.
Yes

## 2023-02-09 NOTE — ED ADULT NURSE NOTE - FINAL NURSING ELECTRONIC SIGNATURE
Referred by: Gideon Reynoso; Medical Diagnosis (from order):      Physical Therapy -  Daily Treatment Note    Visit: 2    SUBJECTIVE                                                                                                             Patient reports that his pain today and working last night was a 6/10 on the left side lower up to shoulder blade. He has been doing exercises and walking. Using heat patches for pain but not when at work. Less sharp pain but still feels pulling. Patient was seen at a different [facility] clinic for his initial therapy evaluation. Patients chart was reviewed and discussed patients initial findings from this appointment on 12/10/2020 with patient today. Pain / Symptoms:  Pain/symptom is: constant  Pain rating (out of 10): Current: 6 ; Best: 3; Worst: 6    OBJECTIVE                                                                                                                        TREATMENT                                                                                                                  Therapeutic Exercise:  Cat and camel x 10 tactile and verbal cues provided  Ara Jocelyn pose 30 sec x 2  ja pose with side bend 30 sec x 1 each side  Hooklying pelvic tilts anterior and posterior with transverse engagement, tactile and verbal cues provided x 10  Bridge with transverse abdominus engagement x 10    Discussion on HEP and HEP updated for patient with paper copy provided. Manual Therapy:  IASTM: The rationale and possible effects of IASTM (Instrument Assisted Soft Tissue Mobilization) were explained to patient, and informed, verbal consent was received. Tool used:  edge; Patient position:  prone;  Location of treatment:  Left paraspinals, scapular region and upper glut max; Length of treatment:  8    PA mobilization grade 2-3 general spine T4-L1  PA mobilization grade 4-5 T7-8-9 (cavitation)      Skilled input: verbal instruction/cues, tactile instruction/cues and posture correction    Writer verbally educated and received verbal consent for hand placement, positioning of patient, and techniques to be performed today from patient for clothing adjustments for techniques and modality application as described above and how they are pertinent to the patient's plan of care. Home Exercise Program: Access Code: [email or web address]   URL: [email or web address]   Date: 12/16/2020   Prepared by: Tato Crooked     Exercises  Cat-Camel - 10 reps - 2 sets - 1 hold - 2x daily  Child's Pose Stretch - 3 reps - 1 sets - 30 hold - 2x daily  Child's Pose with Sidebending - 2 reps - 1 sets - 30 hold - 2x daily  Supine Pelvic Tilt - 10 reps - 2 sets - 1 hold - 2x daily  Supine Bridge - 10 reps - 2 sets - 1 hold - 2x daily    Un-attended Electrical Stimulation (54170/): Interferential Current  Location: left paraspinals  Position: prone  Pulse Rate:  Hz  Intensity: patient tolerance  Cross pattern  In conjunction with: moist hot pack  Duration: 10 minutes  Results: decreased pain  Reaction: no adverse reaction to treatment      ASSESSMENT                                                                                                             Patient reported feeling better after the session today. Less pain overall. He was engaged in the session about his HEP and other activities he could do to help his progress. Pain/symptoms after session: 3    Patient Education:   Results of above outlined education: Verbalizes understanding and Demonstrates understanding      PLAN                                                                                                                           Suggestions for next session as indicated: Progress per plan of care          Procedures and total treatment time documented Time Entry flowsheet. 10-Feb-2023 12:54

## 2023-02-09 NOTE — ED PROVIDER NOTE - CLINICAL SUMMARY MEDICAL DECISION MAKING FREE TEXT BOX
Patient is a 72y F PMHx HLD, CAD (s/p 2 stents Jan 2023 on ASA, plavix, compliant), p/w CP. Concern for acs will get trops, ekg, cxr.

## 2023-02-09 NOTE — ED ADULT NURSE NOTE - OBJECTIVE STATEMENT
72 y.o F PMH HTN, HLD, stents x2 placed last month presenting to the ED for midsternal chest pain that she describes as "pressure and warmth" x 1 week. Pt states that the pain has been intermittent for the past week but much worse today. Pt also states that the chest discomfort is worse on exertion with some associated SOB. AOx4, MAEx4, respirations even and unlabored, skin warm dry and normal for race. Denies n/v/d, dizziness, weakness, fevers, chills, back pain, abd pain. Bed in lowest position, wheels locked, appropriate side rails up. Safety maintained, comfort provided

## 2023-02-10 ENCOUNTER — TRANSCRIPTION ENCOUNTER (OUTPATIENT)
Age: 73
End: 2023-02-10

## 2023-02-10 VITALS
HEART RATE: 83 BPM | SYSTOLIC BLOOD PRESSURE: 112 MMHG | RESPIRATION RATE: 15 BRPM | DIASTOLIC BLOOD PRESSURE: 54 MMHG | OXYGEN SATURATION: 97 %

## 2023-02-10 DIAGNOSIS — R07.9 CHEST PAIN, UNSPECIFIED: ICD-10-CM

## 2023-02-10 LAB
APPEARANCE UR: CLEAR — SIGNIFICANT CHANGE UP
BACTERIA # UR AUTO: NEGATIVE — SIGNIFICANT CHANGE UP
BILIRUB UR-MCNC: NEGATIVE — SIGNIFICANT CHANGE UP
COLOR SPEC: COLORLESS — SIGNIFICANT CHANGE UP
DIFF PNL FLD: NEGATIVE — SIGNIFICANT CHANGE UP
EPI CELLS # UR: 0 /HPF — SIGNIFICANT CHANGE UP
GLUCOSE UR QL: NEGATIVE — SIGNIFICANT CHANGE UP
HYALINE CASTS # UR AUTO: 0 /LPF — SIGNIFICANT CHANGE UP (ref 0–2)
KETONES UR-MCNC: NEGATIVE — SIGNIFICANT CHANGE UP
LEUKOCYTE ESTERASE UR-ACNC: ABNORMAL
NITRITE UR-MCNC: NEGATIVE — SIGNIFICANT CHANGE UP
PH UR: 6 — SIGNIFICANT CHANGE UP (ref 5–8)
PROT UR-MCNC: NEGATIVE — SIGNIFICANT CHANGE UP
RAPID RVP RESULT: SIGNIFICANT CHANGE UP
RBC CASTS # UR COMP ASSIST: 0 /HPF — SIGNIFICANT CHANGE UP (ref 0–4)
SARS-COV-2 RNA SPEC QL NAA+PROBE: SIGNIFICANT CHANGE UP
SP GR SPEC: 1.01 — SIGNIFICANT CHANGE UP (ref 1.01–1.02)
TROPONIN T, HIGH SENSITIVITY RESULT: 13 NG/L — SIGNIFICANT CHANGE UP (ref 0–51)
UROBILINOGEN FLD QL: NEGATIVE — SIGNIFICANT CHANGE UP
WBC UR QL: 3 /HPF — SIGNIFICANT CHANGE UP (ref 0–5)

## 2023-02-10 PROCEDURE — 99285 EMERGENCY DEPT VISIT HI MDM: CPT

## 2023-02-10 PROCEDURE — C1894: CPT

## 2023-02-10 PROCEDURE — 93458 L HRT ARTERY/VENTRICLE ANGIO: CPT | Mod: 26

## 2023-02-10 PROCEDURE — C1769: CPT

## 2023-02-10 PROCEDURE — 93454 CORONARY ARTERY ANGIO S&I: CPT

## 2023-02-10 PROCEDURE — 71046 X-RAY EXAM CHEST 2 VIEWS: CPT

## 2023-02-10 PROCEDURE — C1887: CPT

## 2023-02-10 PROCEDURE — 84132 ASSAY OF SERUM POTASSIUM: CPT

## 2023-02-10 PROCEDURE — 85014 HEMATOCRIT: CPT

## 2023-02-10 PROCEDURE — 85018 HEMOGLOBIN: CPT

## 2023-02-10 PROCEDURE — 36415 COLL VENOUS BLD VENIPUNCTURE: CPT

## 2023-02-10 PROCEDURE — 83605 ASSAY OF LACTIC ACID: CPT

## 2023-02-10 PROCEDURE — 82947 ASSAY GLUCOSE BLOOD QUANT: CPT

## 2023-02-10 PROCEDURE — 82803 BLOOD GASES ANY COMBINATION: CPT

## 2023-02-10 PROCEDURE — 82435 ASSAY OF BLOOD CHLORIDE: CPT

## 2023-02-10 PROCEDURE — 0225U NFCT DS DNA&RNA 21 SARSCOV2: CPT

## 2023-02-10 PROCEDURE — 80053 COMPREHEN METABOLIC PANEL: CPT

## 2023-02-10 PROCEDURE — 82330 ASSAY OF CALCIUM: CPT

## 2023-02-10 PROCEDURE — 93458 L HRT ARTERY/VENTRICLE ANGIO: CPT

## 2023-02-10 PROCEDURE — 81001 URINALYSIS AUTO W/SCOPE: CPT

## 2023-02-10 PROCEDURE — 84295 ASSAY OF SERUM SODIUM: CPT

## 2023-02-10 PROCEDURE — 99152 MOD SED SAME PHYS/QHP 5/>YRS: CPT

## 2023-02-10 PROCEDURE — 84484 ASSAY OF TROPONIN QUANT: CPT

## 2023-02-10 PROCEDURE — 85025 COMPLETE CBC W/AUTO DIFF WBC: CPT

## 2023-02-10 PROCEDURE — 99223 1ST HOSP IP/OBS HIGH 75: CPT

## 2023-02-10 PROCEDURE — 83880 ASSAY OF NATRIURETIC PEPTIDE: CPT

## 2023-02-10 RX ORDER — NITROGLYCERIN 6.5 MG
0.4 CAPSULE, EXTENDED RELEASE ORAL ONCE
Refills: 0 | Status: COMPLETED | OUTPATIENT
Start: 2023-02-10 | End: 2023-02-10

## 2023-02-10 RX ORDER — ASPIRIN/CALCIUM CARB/MAGNESIUM 324 MG
81 TABLET ORAL DAILY
Refills: 0 | Status: DISCONTINUED | OUTPATIENT
Start: 2023-02-10 | End: 2023-02-10

## 2023-02-10 RX ORDER — CLOPIDOGREL BISULFATE 75 MG/1
75 TABLET, FILM COATED ORAL DAILY
Refills: 0 | Status: DISCONTINUED | OUTPATIENT
Start: 2023-02-10 | End: 2023-02-10

## 2023-02-10 RX ORDER — PANTOPRAZOLE SODIUM 20 MG/1
40 TABLET, DELAYED RELEASE ORAL
Refills: 0 | Status: DISCONTINUED | OUTPATIENT
Start: 2023-02-10 | End: 2023-02-10

## 2023-02-10 RX ORDER — AMLODIPINE BESYLATE 2.5 MG/1
5 TABLET ORAL DAILY
Refills: 0 | Status: DISCONTINUED | OUTPATIENT
Start: 2023-02-10 | End: 2023-02-10

## 2023-02-10 RX ORDER — ATORVASTATIN CALCIUM 80 MG/1
20 TABLET, FILM COATED ORAL AT BEDTIME
Refills: 0 | Status: DISCONTINUED | OUTPATIENT
Start: 2023-02-10 | End: 2023-02-10

## 2023-02-10 RX ADMIN — Medication 0.4 MILLIGRAM(S): at 01:25

## 2023-02-10 RX ADMIN — CLOPIDOGREL BISULFATE 75 MILLIGRAM(S): 75 TABLET, FILM COATED ORAL at 13:12

## 2023-02-10 RX ADMIN — Medication 81 MILLIGRAM(S): at 13:12

## 2023-02-10 NOTE — PATIENT PROFILE ADULT - IS THERE A SUSPICION OF ABUSE/NEGLIGENCE?
CC:  Jose Stahl is here today for an eye exam.  Labels on the bottle or ingredients are getting harder to read.  For that really small print he will take off glasses.      Medications, allergies, tobacco history reviewed and updated where needed in the EMR.    Ocular Medications:  none    Denies known Latex allergy or symptoms of Latex sensitivity.   no

## 2023-02-10 NOTE — DISCHARGE NOTE PROVIDER - CARE PROVIDER_API CALL
Isabel Polk)  Cardiovascular Disease; Interventional Cardiology  [address] [place] [address]  Phone: [phone]  Fax: [phone]  Established Patient  Follow Up Time: 2 weeks

## 2023-02-10 NOTE — H&P ADULT - HISTORY OF PRESENT ILLNESS
72y F PMHx HLD, CAD (s/p 2 stents Jan 2023 on ASA, plavix, compliant), HTN (on amlodipine), p/w CP. Patient states CP about 1 week, midline sternal down to upper abdomen, feels more like a pressure than pain, is worse with exertion, sometimes associated with SOB. Is SOB after walking up 9 stairs at home. Denies association with eating, nvd, abdominal pain, cough.

## 2023-02-10 NOTE — DISCHARGE NOTE PROVIDER - NSDCCPCAREPLAN_GEN_ALL_CORE_FT
PRINCIPAL DISCHARGE DIAGNOSIS  Diagnosis: Chest pain  Assessment and Plan of Treatment: non obs dz. medical management- cont ASA. Plavix, statin      SECONDARY DISCHARGE DIAGNOSES  Diagnosis: HTN (hypertension)  Assessment and Plan of Treatment: continue antihypertensive

## 2023-02-10 NOTE — DISCHARGE NOTE NURSING/CASE MANAGEMENT/SOCIAL WORK - NSDCPEFALRISK_GEN_ALL_CORE
For information on Fall & Injury Prevention, visit: [email or web address] OR  [email or web address] OR  https://www.cdc.gov/steadi/patient.html

## 2023-02-10 NOTE — CONSULT NOTE ADULT - ATTENDING COMMENTS
Patient seen and examined. Agree with assessment and plan as outlined above.  72 year-old woman with recent LAD PCI with TITUS (1/10) presents with 1 week of chest discomfort. Patient seen and examined. Agree with assessment and plan as outlined above.  72 year-old woman with recent LAD PCI with TITUS (1/10) presents with 1 week of chest discomfort and shortness of breath with activity. The patient reports symptoms occurring with exertion and relieved with rest.  -Typical anginal symptoms over past week with recent LAD PCI with moderate distal LAD and LCx disease.  -Patient discussed with interventional cardiologist (Dr. Hall). Patient referred for left heart cardiac catheterization.  -Plan discussed in detail with the patient    Barbara Beaulieu MD  Cardiology Attending  [facility]/ [facility]    For day time coverage Mon-Fri see Non-Service Consult Attending on [email or web address], password: [email or web address]; daytime weekends covered by general cardiology consult service attending.)

## 2023-02-10 NOTE — H&P ADULT - ASSESSMENT
72y F PMHx HLD, CAD (s/p 2 stents Jan 2023 on ASA, plavix, compliant), HTN (on amlodipine), p/w CP. Patient states CP about 1 week, midline sternal down to upper abdomen, feels more like a pressure than pain, is worse with exertion, sometimes associated with SOB. Is SOB after walking up 9 stairs at home. Denies association with eating, nvd, abdominal pain, cough.     1 SOB  - ro ACS  - cards fu   - echo and cath planned  - management as per cards     2 HTN  - cw amlodipine  - DASH diet    3 HLD  - cw statin  - DASH diet    eloisa

## 2023-02-10 NOTE — DISCHARGE NOTE PROVIDER - NSDCCPTREATMENT_GEN_ALL_CORE_FT
PRINCIPAL PROCEDURE  Procedure: Left heart catheterization  Findings and Treatment: diagnostic- medical management non obs disease

## 2023-02-10 NOTE — H&P ADULT - NSHPLABSRESULTS_GEN_ALL_CORE
Lab Results:  CBC  CBC Full  -  ( 2023 22:37 )  WBC Count : 9.26 K/uL  RBC Count : 4.53 M/uL  Hemoglobin : 13.5 g/dL  Hematocrit : 39.7 %  Platelet Count - Automated : 239 K/uL  Mean Cell Volume : 87.6 fl  Mean Cell Hemoglobin : 29.8 pg  Mean Cell Hemoglobin Concentration : 34.0 gm/dL  Auto Neutrophil # : 6.00 K/uL  Auto Lymphocyte # : 2.47 K/uL  Auto Monocyte # : 0.51 K/uL  Auto Eosinophil # : 0.17 K/uL  Auto Basophil # : 0.09 K/uL  Auto Neutrophil % : 64.8 %  Auto Lymphocyte % : 26.7 %  Auto Monocyte % : 5.5 %  Auto Eosinophil % : 1.8 %  Auto Basophil % : 1.0 %    .		Differential:	[] Automated		[] Manual  Chemistry                        13.5   9.26  )-----------( 239      ( 2023 22:37 )             39.7         141  |  106  |  16  ----------------------------<  108<H>  3.7   |  20<L>  |  0.74    Ca    10.1      2023 22:37    TPro  8.4<H>  /  Alb  4.5  /  TBili  0.4  /  DBili  x   /  AST  26  /  ALT  33  /  AlkPhos  171<H>      LIVER FUNCTIONS - ( 2023 22:37 )  Alb: 4.5 g/dL / Pro: 8.4 g/dL / ALK PHOS: 171 U/L / ALT: 33 U/L / AST: 26 U/L / GGT: x             Urinalysis Basic - ( 10 Feb 2023 00:50 )    Color: Colorless / Appearance: Clear / S.010 / pH: x  Gluc: x / Ketone: Negative  / Bili: Negative / Urobili: Negative   Blood: x / Protein: Negative / Nitrite: Negative   Leuk Esterase: Moderate / RBC: 0 /hpf / WBC 3 /HPF   Sq Epi: x / Non Sq Epi: 0 /hpf / Bacteria: Negative            MICROBIOLOGY/CULTURES:      RADIOLOGY RESULTS: reviewed

## 2023-02-10 NOTE — DISCHARGE NOTE PROVIDER - NSDCFUADDINST_GEN_ALL_CORE_FT
Wound Care:   the day AFTER your procedure remove bandage GENTLY, and clean using  mild soap and gentle warm, water stream, pat dry. leave OPEN to air. YOU MAY SHOWER   DO NOT apply lotions, creams, ointments, powder, parfumes to your incision site  DO NOT SOAK your site for 1 week ( no baths, no pools, no tubs, etc...)  Check  your groin and /or wrist daily. A small amount of bruising, and soreness are normal    ACTIVITY: for 24 hours   - DO NOT DRIVE  - DO NOT make any important decisions or sign legal documents   - DO NOT operate heavy machinaries   - you may resume sexual activity in 48 hours, unless otherwise instructed by your cardiologist     If your procedure was done through the WRIST: for the NEXT 3 DAYS:  - avoid pushing, pulling, with that affected wrist   - avoid repeated movement of that hand and wrist ( eg: typing, hammering)  - DO NOT LIFT anything more than 5 lbs     If your procedure was done through the GROIN: for the NEXT 5 DAYS  - Limit climbing stairs, DO NOT soak in bathtub or pool  - no strenous activities, pushing, pulling, straining  - Do not lift anything 10lbs or heavier     MEDICATION:   take your medications as explained ( see discharge paperwork)   If you received a STENT, you will be taking antiplatelet medications to KEEP YOUR STENT OPEN ( eg: Aspirin, Plavix.  Take as prescribed DO NOT STOP taking them without consulting with your cardiologist first.     Follow heart healthy diet reccomended by your doctor, , if you smoke STOP SMOKING ( may call [phone] for center of tobacco control if you need assistance)     CALL your doctor to make appointment in 2 WEEKS     ***CALL YOUR DOCTOR***  if you experience: fever, chills, body aches, or severe pain, swelling, redness, heat or yellow discharge at incision site  If you experience Bleeding or excruciating pain at the procedural site, sweliing ( golf ball size) at your procedural site  If you experience CHEST PAIN  If you experience extremity numbness, tingling, temperature change ( of your procedural site)   If you are unable to reach your doctor, you may contact:   -Cardiology Office at [facility] at [phone] or   - [facility] [phone]  - [facility] [phone]

## 2023-02-10 NOTE — DISCHARGE NOTE PROVIDER - NSDCMRMEDTOKEN_GEN_ALL_CORE_FT
amLODIPine 5 mg oral tablet: 1 tab(s) orally 2 times a day  Aspirin Enteric Coated 81 mg oral delayed release tablet: 1 tab(s) orally once a day MDD:1   atorvastatin 20 mg oral tablet: 1 tab(s) orally once a day (at bedtime)  clopidogrel 75 mg oral tablet: 1 tab(s) orally once a day MDD:1

## 2023-02-10 NOTE — DISCHARGE NOTE PROVIDER - HOSPITAL COURSE
HPI:   72y F PMHx HLD, CAD (s/p 2 stents Jan 2023 on ASA, plavix, compliant), HTN (on amlodipine), p/w CP. Patient states CP about 1 week, midline sternal down to upper abdomen, feels more like a pressure than pain, is worse with exertion, sometimes associated with SOB. Is SOB after walking up 9 stairs at home. Denies association with eating, nvd, abdominal pain, cough.  (10 Feb 2023 08:49)    s/p LHC   LAD 50% -MM   RRA failed access -band in place   RFA pull  No hematoma or bleeding + distal pulses   Cont ASA/Plavix/Statin   ambulating without complaint     Per Dr Nguyen -discharge home

## 2023-02-10 NOTE — CONSULT NOTE ADULT - SUBJECTIVE AND OBJECTIVE BOX
Patient seen and evaluated at bedside    Reason for consult:    HPI:      PMHx:   Cyst of right ovary    HTN (hypertension)    HLD (hyperlipidemia)        PSHx:   No significant past surgical history        Allergies:  fluoroquinolone antibiotics (Rash)  Gadavist (Other)  shellfish (Hives)  sulfADIAZINE (Hives)      Home Meds:    Current Medications:       FAMILY HISTORY:      Social History:  Smoking History:  Alcohol Use:  Drug Use:    Review of Systems:  REVIEW OF SYSTEMS:  CONSTITUTIONAL: No weakness, fevers or chills  EYES/ENT: No visual changes;  No dysphagia  NECK: No pain or stiffness  RESPIRATORY: No cough, wheezing, hemoptysis; No shortness of breath  CARDIOVASCULAR: No chest pain or palpitations; No lower extremity edema  GASTROINTESTINAL: No abdominal or epigastric pain. No nausea, vomiting, or hematemesis; No diarrhea or constipation. No melena or hematochezia.  BACK: No back pain  GENITOURINARY: No dysuria, frequency or hematuria  NEUROLOGICAL: No numbness or weakness  SKIN: No itching, burning, rashes, or lesions   All other review of systems is negative unless indicated above.    [ ] All other systems negative  [ ] Unable to assess ROS due to    Physical Exam:  T(F): 98.4 (02-09), Max: 98.4 (02-09)  HR: 93 (02-09) (93 - 96)  BP: 132/74 (02-09) (132/74 - 139/79)  RR: 18 (02-09)  SpO2: 98% (02-09)  GENERAL: No acute distress, well-developed  HEAD:  Atraumatic, Normocephalic  ENT: EOMI, PERRLA, conjunctiva and sclera clear, Neck supple, No JVD, moist mucosa  CHEST/LUNG: Clear to auscultation bilaterally; No wheeze, equal breath sounds bilaterally   BACK: No spinal tenderness  HEART: Regular rate and rhythm; No murmurs, rubs, or gallops  ABDOMEN: Soft, Nontender, Nondistended; Bowel sounds present  EXTREMITIES:  No clubbing, cyanosis, or edema  PSYCH: Nl behavior, nl affect  NEUROLOGY: AAOx3, non-focal, cranial nerves intact  SKIN: Normal color, No rashes or lesions  LINES:    Cardiovascular Diagnostic Testing:    CXR: Personally reviewed    Labs: Personally reviewed                        13.5   9.26  )-----------( 239      ( 09 Feb 2023 22:37 )             39.7     02-09    141  |  106  |  16  ----------------------------<  108<H>  3.7   |  20<L>  |  0.74    Ca    10.1      09 Feb 2023 22:37    TPro  8.4<H>  /  Alb  4.5  /  TBili  0.4  /  DBili  x   /  AST  26  /  ALT  33  /  AlkPhos  171<H>  02-09      Serum Pro-Brain Natriuretic Peptide: 77 pg/mL (02-09 @ 22:37)         Patient seen and evaluated at bedside    Reason for consult: chest pain    HPI:      Ms Rdz is a 72-year-old female CAD s/p PCI to pLAD x2 1/10/23, HTN, HLD, who presents with chest pain.     She states pain has been going on x 1 week, pain is left sided and . Pt saw Dr. Polk most recently 1/20/23. As per above, she presented In December went to ER with malaise, nausea and headache and sent home. In January similar thing happened but went to NS. ECHO and stress test then cath with 2 stents to LAD. TTE with hyperdynamic systolic function and EF 73 without seg WMAs and otherwise normal. Of note, LHC as below showed disease in mLAD and LCx, the latter of which was assessed by iFR and determined to be nonsignificant and no intervention performed.  In the ER, VSS and initial hs-Tn <6. EKG with NSR.     PMHx:   Cyst of right ovary    HTN (hypertension)    HLD (hyperlipidemia)        PSHx:   No significant past surgical history        Allergies:  fluoroquinolone antibiotics (Rash)  Gadavist (Other)  shellfish (Hives)  sulfADIAZINE (Hives)      Home Meds:    Current Medications:       FAMILY HISTORY:      Social History:  Smoking History:  Alcohol Use:  Drug Use:    Review of Systems:  REVIEW OF SYSTEMS:  CONSTITUTIONAL: No weakness, fevers or chills  EYES/ENT: No visual changes;  No dysphagia  NECK: No pain or stiffness  RESPIRATORY: No cough, wheezing, hemoptysis; No shortness of breath  CARDIOVASCULAR: No chest pain or palpitations; No lower extremity edema  GASTROINTESTINAL: No abdominal or epigastric pain. No nausea, vomiting, or hematemesis; No diarrhea or constipation. No melena or hematochezia.  BACK: No back pain  GENITOURINARY: No dysuria, frequency or hematuria  NEUROLOGICAL: No numbness or weakness  SKIN: No itching, burning, rashes, or lesions   All other review of systems is negative unless indicated above.    [ ] All other systems negative  [ ] Unable to assess ROS due to    Physical Exam:  T(F): 98.4 (02-09), Max: 98.4 (02-09)  HR: 93 (02-09) (93 - 96)  BP: 132/74 (02-09) (132/74 - 139/79)  RR: 18 (02-09)  SpO2: 98% (02-09)  GENERAL: No acute distress, well-developed  HEAD:  Atraumatic, Normocephalic  ENT: EOMI, PERRLA, conjunctiva and sclera clear, Neck supple, No JVD, moist mucosa  CHEST/LUNG: Clear to auscultation bilaterally; No wheeze, equal breath sounds bilaterally   BACK: No spinal tenderness  HEART: Regular rate and rhythm; No murmurs, rubs, or gallops  ABDOMEN: Soft, Nontender, Nondistended; Bowel sounds present  EXTREMITIES:  No clubbing, cyanosis, or edema  PSYCH: Nl behavior, nl affect  NEUROLOGY: AAOx3, non-focal, cranial nerves intact  SKIN: Normal color, No rashes or lesions  LINES:    Cardiovascular Diagnostic Testing:    CXR: Personally reviewed    Labs: Personally reviewed                        13.5   9.26  )-----------( 239      ( 09 Feb 2023 22:37 )             39.7     02-09    141  |  106  |  16  ----------------------------<  108<H>  3.7   |  20<L>  |  0.74    Ca    10.1      09 Feb 2023 22:37    TPro  8.4<H>  /  Alb  4.5  /  TBili  0.4  /  DBili  x   /  AST  26  /  ALT  33  /  AlkPhos  171<H>  02-09      Serum Pro-Brain Natriuretic Peptide: 77 pg/mL (02-09 @ 22:37)    TTE 1/9/23  Dimensions:    Normal Values:  LA:     3.7    2.0 - 4.0 cm  Ao:     2.9    2.0 - 3.8 cm  SEPTUM: 0.9    0.6 - 1.2 cm  PWT:    0.9    0.6 - 1.1 cm  LVIDd:  4.0    3.0 - 5.6 cm  LVIDs:  2.5    1.8 - 4.0 cm  Derived variables:  LVMI: 65 g/m2  RWT: 0.45  Fractional short: 38 %  EF (Modified Ortiz Rule): 74 %  ------------------------------------------------------------------------  Observations:  Mitral Valve: Normal mitral valve. No mitral regurgitation  seen.  Aortic Valve/Aorta: Normal trileaflet aortic valve. No  aortic valve regurgitation seen.  Aortic Root: 2.9 cm.  Left Atrium: Normal left atrium.  LA volume index = 14  cc/m2.  Left Ventricle: Hyperdynamic left ventricular systolic  function. No segmental wall motion abnormalities. LVEF  calculated using biplane Ortiz's method is 74%. Normal  left ventricular internal dimensions and wall thicknesses.  Normal diastolicfunction.  Right Heart: Normal right atrium. Normal right ventricular  size and function. Normal tricuspid valve. Minimal  tricuspid regurgitation. Normal pulmonic valve.  Pericardium/Pleura: No pericardial effusion seen.  Hemodynamic: Estimated right atrial pressure is 8 mm Hg.  Estimated right ventricular systolic pressure equals 32 mm  Hg, assuming right atrial pressure equals 8 mm Hg,  consistent with normal pulmonary pressures.  ------------------------------------------------------------------------  Conclusions:  1. Normal left atrium.  LA volume index = 14 cc/m2.  2. Normal left ventricular internal dimensions and wall  thicknesses.  3. Hyperdynamic left ventricular systolic function. No  segmental wall motion abnormalities. LVEF calculated using  biplane Ortiz's method is 74%.  4. Normal diastolic function.  5. Normal right atrium.  6. Normal right ventricular size and function.  7. Normal tricuspid valve. Minimal tricuspid regurgitation.  8. Estimated pulmonary artery systolicpressure equals 32  mm Hg, assuming right atrial pressure equals 8  mm Hg,  consistent with normal pulmonary pressures.  9. No pericardial effusion seen.  *** No previous Echo exam.        [facility] w PCI (1/10/23)  Coronary Angiography   The coronary circulation is right dominant.      LM   Left main artery: Angiography shows no disease.      LAD   Proximal left anterior descending: Angiography shows severe  atherosclerosis. There is an 80 % stenosis. Mid left anterior  descending: Angiography shows moderate atherosclerosis. There is a 60  % stenosis.    CX   Circumflex: Angiography shows moderate atherosclerosis. Instant  wave-free ratio was performed. Based on the results of this  study, the lesion was judged to be non-significant and no intervention  was performed.    RCA   Proximal right coronary artery: Angiography shows mild  atherosclerosis.      Conclusions:   Severe atherosclerosis of the pLAD status post successful PCI with TITUS  x 2. There is moderate atherosclerosis of the LCx  (iFR negative) and mLAD.        Patient seen and evaluated at bedside    Reason for consult: chest pain    HPI:  Ms Rdz is a 72-year-old female CAD s/p PCI to pLAD x2 1/10/23, HTN, HLD, who presents with chest pain.     She states pain has been going on x 1 week, pain is left sided and . Pt saw Dr. Polk most recently 1/20/23. As per above, she presented In December went to ER with malaise, nausea and headache and sent home. In January similar thing happened but went to NS. ECHO and stress test then cath with 2 stents to LAD. TTE with hyperdynamic systolic function and EF 73 without seg WMAs and otherwise normal. Of note, LHC as below showed disease in mLAD and LCx, the latter of which was assessed by iFR and determined to be nonsignificant and no intervention performed.  In the ER, VSS and initial hs-Tn <6. EKG with NSR.     PMHx:   Cyst of right ovary    HTN (hypertension)    HLD (hyperlipidemia)        PSHx:   No significant past surgical history        Allergies:  fluoroquinolone antibiotics (Rash)  Gadavist (Other)  shellfish (Hives)  sulfADIAZINE (Hives)      Home Meds:    Current Medications:       FAMILY HISTORY:      Social History:  Smoking History:  Alcohol Use:  Drug Use:    Review of Systems:  REVIEW OF SYSTEMS:  CONSTITUTIONAL: No weakness, fevers or chills  EYES/ENT: No visual changes;  No dysphagia  NECK: No pain or stiffness  RESPIRATORY: No cough, wheezing, hemoptysis; No shortness of breath  CARDIOVASCULAR: No chest pain or palpitations; No lower extremity edema  GASTROINTESTINAL: No abdominal or epigastric pain. No nausea, vomiting, or hematemesis; No diarrhea or constipation. No melena or hematochezia.  BACK: No back pain  GENITOURINARY: No dysuria, frequency or hematuria  NEUROLOGICAL: No numbness or weakness  SKIN: No itching, burning, rashes, or lesions   All other review of systems is negative unless indicated above.    [ ] All other systems negative  [ ] Unable to assess ROS due to    Physical Exam:  T(F): 98.4 (02-09), Max: 98.4 (02-09)  HR: 93 (02-09) (93 - 96)  BP: 132/74 (02-09) (132/74 - 139/79)  RR: 18 (02-09)  SpO2: 98% (02-09)  GENERAL: No acute distress, well-developed  HEAD:  Atraumatic, Normocephalic  ENT: EOMI, PERRLA, conjunctiva and sclera clear, Neck supple, No JVD, moist mucosa  CHEST/LUNG: Clear to auscultation bilaterally; No wheeze, equal breath sounds bilaterally   BACK: No spinal tenderness  HEART: Regular rate and rhythm; No murmurs, rubs, or gallops  ABDOMEN: Soft, Nontender, Nondistended; Bowel sounds present  EXTREMITIES:  No clubbing, cyanosis, or edema  PSYCH: Nl behavior, nl affect  NEUROLOGY: AAOx3, non-focal, cranial nerves intact  SKIN: Normal color, No rashes or lesions  LINES:    Cardiovascular Diagnostic Testing:    CXR: Personally reviewed    Labs: Personally reviewed                        13.5   9.26  )-----------( 239      ( 09 Feb 2023 22:37 )             39.7     02-09    141  |  106  |  16  ----------------------------<  108<H>  3.7   |  20<L>  |  0.74    Ca    10.1      09 Feb 2023 22:37    TPro  8.4<H>  /  Alb  4.5  /  TBili  0.4  /  DBili  x   /  AST  26  /  ALT  33  /  AlkPhos  171<H>  02-09      Serum Pro-Brain Natriuretic Peptide: 77 pg/mL (02-09 @ 22:37)    TTE 1/9/23  Dimensions:    Normal Values:  LA:     3.7    2.0 - 4.0 cm  Ao:     2.9    2.0 - 3.8 cm  SEPTUM: 0.9    0.6 - 1.2 cm  PWT:    0.9    0.6 - 1.1 cm  LVIDd:  4.0    3.0 - 5.6 cm  LVIDs:  2.5    1.8 - 4.0 cm  Derived variables:  LVMI: 65 g/m2  RWT: 0.45  Fractional short: 38 %  EF (Modified Ortiz Rule): 74 %  ------------------------------------------------------------------------  Observations:  Mitral Valve: Normal mitral valve. No mitral regurgitation  seen.  Aortic Valve/Aorta: Normal trileaflet aortic valve. No  aortic valve regurgitation seen.  Aortic Root: 2.9 cm.  Left Atrium: Normal left atrium.  LA volume index = 14  cc/m2.  Left Ventricle: Hyperdynamic left ventricular systolic  function. No segmental wall motion abnormalities. LVEF  calculated using biplane Ortiz's method is 74%. Normal  left ventricular internal dimensions and wall thicknesses.  Normal diastolicfunction.  Right Heart: Normal right atrium. Normal right ventricular  size and function. Normal tricuspid valve. Minimal  tricuspid regurgitation. Normal pulmonic valve.  Pericardium/Pleura: No pericardial effusion seen.  Hemodynamic: Estimated right atrial pressure is 8 mm Hg.  Estimated right ventricular systolic pressure equals 32 mm  Hg, assuming right atrial pressure equals 8 mm Hg,  consistent with normal pulmonary pressures.  ------------------------------------------------------------------------  Conclusions:  1. Normal left atrium.  LA volume index = 14 cc/m2.  2. Normal left ventricular internal dimensions and wall  thicknesses.  3. Hyperdynamic left ventricular systolic function. No  segmental wall motion abnormalities. LVEF calculated using  biplane Ortiz's method is 74%.  4. Normal diastolic function.  5. Normal right atrium.  6. Normal right ventricular size and function.  7. Normal tricuspid valve. Minimal tricuspid regurgitation.  8. Estimated pulmonary artery systolicpressure equals 32  mm Hg, assuming right atrial pressure equals 8  mm Hg,  consistent with normal pulmonary pressures.  9. No pericardial effusion seen.  *** No previous Echo exam.        [facility] w PCI (1/10/23)  Coronary Angiography   The coronary circulation is right dominant.      LM   Left main artery: Angiography shows no disease.      LAD   Proximal left anterior descending: Angiography shows severe  atherosclerosis. There is an 80 % stenosis. Mid left anterior  descending: Angiography shows moderate atherosclerosis. There is a 60  % stenosis.    CX   Circumflex: Angiography shows moderate atherosclerosis. Instant  wave-free ratio was performed. Based on the results of this  study, the lesion was judged to be non-significant and no intervention  was performed.    RCA   Proximal right coronary artery: Angiography shows mild  atherosclerosis.      Conclusions:   Severe atherosclerosis of the pLAD status post successful PCI with TITUS  x 2. There is moderate atherosclerosis of the LCx  (iFR negative) and mLAD.        Patient seen and evaluated at bedside    Reason for consult: chest pain    HPI:  Ms Rdz is a 72-year-old female CAD s/p PCI to pLAD x2 1/10/23, HTN, HLD, who presents with chest pain.     She states pain has been going on x 1 week, pain is left sided and . Pt saw Dr. Polk most recently 1/20/23. As per above, she presented In December went to ER with malaise, nausea and headache and sent home. In January similar thing happened but went to NS. ECHO and stress test then cath with 2 stents to LAD. TTE with hyperdynamic systolic function and EF 73 without seg WMAs and otherwise normal. Of note, LHC as below showed disease in mLAD and LCx, the latter of which was assessed by iFR and determined to be nonsignificant and no intervention performed.  In the ER, VSS and initial hs-Tn <6. EKG with NSR.     PMHx:   Cyst of right ovary    HTN (hypertension)    HLD (hyperlipidemia)        PSHx:   No significant past surgical history        Allergies:  fluoroquinolone antibiotics (Rash)  Gadavist (Other)  shellfish (Hives)  sulfADIAZINE (Hives)      Home Meds:    Current Medications:       FAMILY HISTORY:      Social History:  Smoking History:  Alcohol Use:  Drug Use:    Review of Systems:  REVIEW OF SYSTEMS:  CONSTITUTIONAL: No weakness, fevers or chills  EYES/ENT: No visual changes;  No dysphagia  NECK: No pain or stiffness  RESPIRATORY: No cough, wheezing, hemoptysis;   CARDIOVASCULAR: No palpitations; No lower extremity edema  GASTROINTESTINAL: No abdominal or epigastric pain. No nausea, vomiting, or hematemesis; No diarrhea or constipation. No melena or hematochezia.  BACK: No back pain  GENITOURINARY: No dysuria, frequency or hematuria  NEUROLOGICAL: No numbness or weakness  SKIN: No itching, burning, rashes, or lesions   All other review of systems is negative unless indicated above.    [ ] All other systems negative  [ ] Unable to assess ROS due to    Physical Exam:  T(F): 98.4 (02-09), Max: 98.4 (02-09)  HR: 93 (02-09) (93 - 96)  BP: 132/74 (02-09) (132/74 - 139/79)  RR: 18 (02-09)  SpO2: 98% (02-09)  GENERAL: No acute distress, well-developed  HEAD:  Atraumatic, Normocephalic  ENT: EOMI, PERRLA, conjunctiva and sclera clear, Neck supple, No JVD, moist mucosa  CHEST/LUNG: Clear to auscultation bilaterally; No wheeze, equal breath sounds bilaterally   BACK: No spinal tenderness  HEART: Regular rate and rhythm; No murmurs, rubs, or gallops  ABDOMEN: Soft, Nontender, Nondistended; Bowel sounds present  EXTREMITIES:  No clubbing, cyanosis, or edema  PSYCH: Nl behavior, nl affect  NEUROLOGY: AAOx3, non-focal, cranial nerves intact  SKIN: Normal color, No rashes or lesions  LINES:    Cardiovascular Diagnostic Testing:    CXR: Personally reviewed    Labs: Personally reviewed                        13.5   9.26  )-----------( 239      ( 09 Feb 2023 22:37 )             39.7     02-09    141  |  106  |  16  ----------------------------<  108<H>  3.7   |  20<L>  |  0.74    Ca    10.1      09 Feb 2023 22:37    TPro  8.4<H>  /  Alb  4.5  /  TBili  0.4  /  DBili  x   /  AST  26  /  ALT  33  /  AlkPhos  171<H>  02-09      Serum Pro-Brain Natriuretic Peptide: 77 pg/mL (02-09 @ 22:37)    TTE 1/9/23  Dimensions:    Normal Values:  LA:     3.7    2.0 - 4.0 cm  Ao:     2.9    2.0 - 3.8 cm  SEPTUM: 0.9    0.6 - 1.2 cm  PWT:    0.9    0.6 - 1.1 cm  LVIDd:  4.0    3.0 - 5.6 cm  LVIDs:  2.5    1.8 - 4.0 cm  Derived variables:  LVMI: 65 g/m2  RWT: 0.45  Fractional short: 38 %  EF (Modified Ortiz Rule): 74 %  ------------------------------------------------------------------------  Observations:  Mitral Valve: Normal mitral valve. No mitral regurgitation  seen.  Aortic Valve/Aorta: Normal trileaflet aortic valve. No  aortic valve regurgitation seen.  Aortic Root: 2.9 cm.  Left Atrium: Normal left atrium.  LA volume index = 14  cc/m2.  Left Ventricle: Hyperdynamic left ventricular systolic  function. No segmental wall motion abnormalities. LVEF  calculated using biplane Ortiz's method is 74%. Normal  left ventricular internal dimensions and wall thicknesses.  Normal diastolicfunction.  Right Heart: Normal right atrium. Normal right ventricular  size and function. Normal tricuspid valve. Minimal  tricuspid regurgitation. Normal pulmonic valve.  Pericardium/Pleura: No pericardial effusion seen.  Hemodynamic: Estimated right atrial pressure is 8 mm Hg.  Estimated right ventricular systolic pressure equals 32 mm  Hg, assuming right atrial pressure equals 8 mm Hg,  consistent with normal pulmonary pressures.  ------------------------------------------------------------------------  Conclusions:  1. Normal left atrium.  LA volume index = 14 cc/m2.  2. Normal left ventricular internal dimensions and wall  thicknesses.  3. Hyperdynamic left ventricular systolic function. No  segmental wall motion abnormalities. LVEF calculated using  biplane Ortiz's method is 74%.  4. Normal diastolic function.  5. Normal right atrium.  6. Normal right ventricular size and function.  7. Normal tricuspid valve. Minimal tricuspid regurgitation.  8. Estimated pulmonary artery systolicpressure equals 32  mm Hg, assuming right atrial pressure equals 8  mm Hg,  consistent with normal pulmonary pressures.  9. No pericardial effusion seen.  *** No previous Echo exam.        [facility] w PCI (1/10/23)  Coronary Angiography   The coronary circulation is right dominant.      LM   Left main artery: Angiography shows no disease.      LAD   Proximal left anterior descending: Angiography shows severe  atherosclerosis. There is an 80 % stenosis. Mid left anterior  descending: Angiography shows moderate atherosclerosis. There is a 60  % stenosis.    CX   Circumflex: Angiography shows moderate atherosclerosis. Instant  wave-free ratio was performed. Based on the results of this  study, the lesion was judged to be non-significant and no intervention  was performed.    RCA   Proximal right coronary artery: Angiography shows mild  atherosclerosis.      Conclusions:   Severe atherosclerosis of the pLAD status post successful PCI with TITUS  x 2. There is moderate atherosclerosis of the LCx  (iFR negative) and mLAD.        Patient seen and evaluated at bedside    Reason for consult: chest pain    HPI:  Ms Rdz is a 72-year-old female CAD s/p PCI to pLAD x2 1/10/23, HTN, HLD, who presents with chest pain.     Pt saw Dr. Polk most recently 1/20/23. As per above, she presented In December went to ER with malaise, nausea and headache and sent home. In January similar thing happened but went to NS. ECHO and stress test then cath with 2 stents to LAD. TTE with hyperdynamic systolic function and EF 73 without seg WMAs and otherwise normal. Of note, LHC as below showed disease in mLAD and LCx, the latter of which was assessed by iFR and determined to be nonsignificant and no intervention performed.    She states pain has been going on x 1 week, pain is in the center of chest and moves 'up and down' and also notes a 'burning' quality to the pain. In the ER, VSS and initial hs-Tn <6. EKG with NSR and no acute ischemic abnormalities.     PMHx:   Cyst of right ovary    HTN (hypertension)    HLD (hyperlipidemia)        PSHx:   No significant past surgical history        Allergies:  fluoroquinolone antibiotics (Rash)  Gadavist (Other)  shellfish (Hives)  sulfADIAZINE (Hives)      Home Meds:    Current Medications:       FAMILY HISTORY:      Social History:  Smoking History:  Alcohol Use:  Drug Use:    Review of Systems:  REVIEW OF SYSTEMS:  CONSTITUTIONAL: No weakness, fevers or chills  EYES/ENT: No visual changes;  No dysphagia  NECK: No pain or stiffness  RESPIRATORY: No cough, wheezing, hemoptysis;   CARDIOVASCULAR: No palpitations; No lower extremity edema  GASTROINTESTINAL: No abdominal or epigastric pain. No nausea, vomiting, or hematemesis; No diarrhea or constipation. No melena or hematochezia.  BACK: No back pain  GENITOURINARY: No dysuria, frequency or hematuria  NEUROLOGICAL: No numbness or weakness  SKIN: No itching, burning, rashes, or lesions   All other review of systems is negative unless indicated above.    [ ] All other systems negative  [ ] Unable to assess ROS due to    Physical Exam:  T(F): 98.4 (02-09), Max: 98.4 (02-09)  HR: 93 (02-09) (93 - 96)  BP: 132/74 (02-09) (132/74 - 139/79)  RR: 18 (02-09)  SpO2: 98% (02-09)  GENERAL: No acute distress, well-developed  HEAD:  Atraumatic, Normocephalic  ENT: EOMI, PERRLA, conjunctiva and sclera clear, Neck supple, No JVD, moist mucosa  CHEST/LUNG: Clear to auscultation bilaterally; No wheeze, equal breath sounds bilaterally   BACK: No spinal tenderness  HEART: Regular rate and rhythm; No murmurs, rubs, or gallops  ABDOMEN: Soft, Nontender, Nondistended; Bowel sounds present  EXTREMITIES:  No clubbing, cyanosis, or edema  PSYCH: Nl behavior, nl affect  NEUROLOGY: AAOx3, non-focal, cranial nerves intact  SKIN: Normal color, No rashes or lesions  LINES:    Cardiovascular Diagnostic Testing:    CXR: Personally reviewed    Labs: Personally reviewed                        13.5   9.26  )-----------( 239      ( 09 Feb 2023 22:37 )             39.7     02-09    141  |  106  |  16  ----------------------------<  108<H>  3.7   |  20<L>  |  0.74    Ca    10.1      09 Feb 2023 22:37    TPro  8.4<H>  /  Alb  4.5  /  TBili  0.4  /  DBili  x   /  AST  26  /  ALT  33  /  AlkPhos  171<H>  02-09      Serum Pro-Brain Natriuretic Peptide: 77 pg/mL (02-09 @ 22:37)    TTE 1/9/23  Dimensions:    Normal Values:  LA:     3.7    2.0 - 4.0 cm  Ao:     2.9    2.0 - 3.8 cm  SEPTUM: 0.9    0.6 - 1.2 cm  PWT:    0.9    0.6 - 1.1 cm  LVIDd:  4.0    3.0 - 5.6 cm  LVIDs:  2.5    1.8 - 4.0 cm  Derived variables:  LVMI: 65 g/m2  RWT: 0.45  Fractional short: 38 %  EF (Modified Ortiz Rule): 74 %  ------------------------------------------------------------------------  Observations:  Mitral Valve: Normal mitral valve. No mitral regurgitation  seen.  Aortic Valve/Aorta: Normal trileaflet aortic valve. No  aortic valve regurgitation seen.  Aortic Root: 2.9 cm.  Left Atrium: Normal left atrium.  LA volume index = 14  cc/m2.  Left Ventricle: Hyperdynamic left ventricular systolic  function. No segmental wall motion abnormalities. LVEF  calculated using biplane Ortiz's method is 74%. Normal  left ventricular internal dimensions and wall thicknesses.  Normal diastolicfunction.  Right Heart: Normal right atrium. Normal right ventricular  size and function. Normal tricuspid valve. Minimal  tricuspid regurgitation. Normal pulmonic valve.  Pericardium/Pleura: No pericardial effusion seen.  Hemodynamic: Estimated right atrial pressure is 8 mm Hg.  Estimated right ventricular systolic pressure equals 32 mm  Hg, assuming right atrial pressure equals 8 mm Hg,  consistent with normal pulmonary pressures.  ------------------------------------------------------------------------  Conclusions:  1. Normal left atrium.  LA volume index = 14 cc/m2.  2. Normal left ventricular internal dimensions and wall  thicknesses.  3. Hyperdynamic left ventricular systolic function. No  segmental wall motion abnormalities. LVEF calculated using  biplane Ortiz's method is 74%.  4. Normal diastolic function.  5. Normal right atrium.  6. Normal right ventricular size and function.  7. Normal tricuspid valve. Minimal tricuspid regurgitation.  8. Estimated pulmonary artery systolicpressure equals 32  mm Hg, assuming right atrial pressure equals 8  mm Hg,  consistent with normal pulmonary pressures.  9. No pericardial effusion seen.  *** No previous Echo exam.        [facility] w PCI (1/10/23)  Coronary Angiography   The coronary circulation is right dominant.      LM   Left main artery: Angiography shows no disease.      LAD   Proximal left anterior descending: Angiography shows severe  atherosclerosis. There is an 80 % stenosis. Mid left anterior  descending: Angiography shows moderate atherosclerosis. There is a 60  % stenosis.    CX   Circumflex: Angiography shows moderate atherosclerosis. Instant  wave-free ratio was performed. Based on the results of this  study, the lesion was judged to be non-significant and no intervention  was performed.    RCA   Proximal right coronary artery: Angiography shows mild  atherosclerosis.      Conclusions:   Severe atherosclerosis of the pLAD status post successful PCI with TITUS  x 2. There is moderate atherosclerosis of the LCx  (iFR negative) and mLAD.        Patient seen and evaluated at bedside    Reason for consult: chest pain    HPI:  Ms Rdz is a 72-year-old female CAD s/p PCI to pLAD x2 1/10/23, HTN, HLD, who presents with chest pain.     Pt saw Dr. Polk most recently 1/20/23. As per above, she presented In December went to ER with malaise, nausea and headache and sent home. In January similar thing happened but went to NS. ECHO and stress test then cath with 2 stents to LAD. TTE with hyperdynamic systolic function and EF 73 without seg WMAs and otherwise normal. Of note, LHC as below showed disease in mLAD and LCx, the latter of which was assessed by iFR and determined to be nonsignificant and no intervention performed.    She states pain has been going on x 1 week, pain is in the center of chest and moves 'up and down' and also notes a 'burning' quality to the pain. In the ER, VSS and initial hs-Tn <6. EKG with NSR and no acute ischemic abnormalities.     PMHx:   Cyst of right ovary    HTN (hypertension)    HLD (hyperlipidemia)        PSHx:   No significant past surgical history        Allergies:  fluoroquinolone antibiotics (Rash)  Gadavist (Other)  shellfish (Hives)  sulfADIAZINE (Hives)      Home Meds:  Home Medications:  amLODIPine 5 mg oral tablet: 1 tab(s) orally 2 times a day (10 Feb 2023 08:49)    Current Medications:     FAMILY HISTORY:      Social History:  Smoking History:  Alcohol Use:  Drug Use:    Review of Systems:  REVIEW OF SYSTEMS:  CONSTITUTIONAL: No weakness, fevers or chills  EYES/ENT: No visual changes;  No dysphagia  NECK: No pain or stiffness  RESPIRATORY: No cough, wheezing, hemoptysis;   CARDIOVASCULAR: No palpitations; No lower extremity edema  GASTROINTESTINAL: No abdominal or epigastric pain. No nausea, vomiting, or hematemesis; No diarrhea or constipation. No melena or hematochezia.  BACK: No back pain  GENITOURINARY: No dysuria, frequency or hematuria  NEUROLOGICAL: No numbness or weakness  SKIN: No itching, burning, rashes, or lesions   All other review of systems is negative unless indicated above.    [ ] All other systems negative  [ ] Unable to assess ROS due to    Physical Exam:  T(F): 98.4 (02-09), Max: 98.4 (02-09)  HR: 93 (02-09) (93 - 96)  BP: 132/74 (02-09) (132/74 - 139/79)  RR: 18 (02-09)  SpO2: 98% (02-09)  GENERAL: No acute distress, well-developed  HEAD:  Atraumatic, Normocephalic  ENT: EOMI, PERRLA, conjunctiva and sclera clear, Neck supple, No JVD, moist mucosa  CHEST/LUNG: Clear to auscultation bilaterally; No wheeze, equal breath sounds bilaterally   BACK: No spinal tenderness  HEART: Regular rate and rhythm; No murmurs, rubs, or gallops  ABDOMEN: Soft, Nontender, Nondistended; Bowel sounds present  EXTREMITIES:  No clubbing, cyanosis, or edema  PSYCH: Nl behavior, nl affect  NEUROLOGY: AAOx3, non-focal, cranial nerves intact  SKIN: Normal color, No rashes or lesions  LINES:    Cardiovascular Diagnostic Testing:    CXR: Personally reviewed    Labs: Personally reviewed                        13.5   9.26  )-----------( 239      ( 09 Feb 2023 22:37 )             39.7     02-09    141  |  106  |  16  ----------------------------<  108<H>  3.7   |  20<L>  |  0.74    Ca    10.1      09 Feb 2023 22:37    TPro  8.4<H>  /  Alb  4.5  /  TBili  0.4  /  DBili  x   /  AST  26  /  ALT  33  /  AlkPhos  171<H>  02-09      Serum Pro-Brain Natriuretic Peptide: 77 pg/mL (02-09 @ 22:37)    TTE 1/9/23  Dimensions:    Normal Values:  LA:     3.7    2.0 - 4.0 cm  Ao:     2.9    2.0 - 3.8 cm  SEPTUM: 0.9    0.6 - 1.2 cm  PWT:    0.9    0.6 - 1.1 cm  LVIDd:  4.0    3.0 - 5.6 cm  LVIDs:  2.5    1.8 - 4.0 cm  Derived variables:  LVMI: 65 g/m2  RWT: 0.45  Fractional short: 38 %  EF (Modified Ortiz Rule): 74 %  ------------------------------------------------------------------------  Observations:  Mitral Valve: Normal mitral valve. No mitral regurgitation  seen.  Aortic Valve/Aorta: Normal trileaflet aortic valve. No  aortic valve regurgitation seen.  Aortic Root: 2.9 cm.  Left Atrium: Normal left atrium.  LA volume index = 14  cc/m2.  Left Ventricle: Hyperdynamic left ventricular systolic  function. No segmental wall motion abnormalities. LVEF  calculated using biplane Ortiz's method is 74%. Normal  left ventricular internal dimensions and wall thicknesses.  Normal diastolicfunction.  Right Heart: Normal right atrium. Normal right ventricular  size and function. Normal tricuspid valve. Minimal  tricuspid regurgitation. Normal pulmonic valve.  Pericardium/Pleura: No pericardial effusion seen.  Hemodynamic: Estimated right atrial pressure is 8 mm Hg.  Estimated right ventricular systolic pressure equals 32 mm  Hg, assuming right atrial pressure equals 8 mm Hg,  consistent with normal pulmonary pressures.  ------------------------------------------------------------------------  Conclusions:  1. Normal left atrium.  LA volume index = 14 cc/m2.  2. Normal left ventricular internal dimensions and wall  thicknesses.  3. Hyperdynamic left ventricular systolic function. No  segmental wall motion abnormalities. LVEF calculated using  biplane Ortiz's method is 74%.  4. Normal diastolic function.  5. Normal right atrium.  6. Normal right ventricular size and function.  7. Normal tricuspid valve. Minimal tricuspid regurgitation.  8. Estimated pulmonary artery systolicpressure equals 32  mm Hg, assuming right atrial pressure equals 8  mm Hg,  consistent with normal pulmonary pressures.  9. No pericardial effusion seen.  *** No previous Echo exam.        [facility] w PCI (1/10/23)  Coronary Angiography   The coronary circulation is right dominant.      LM   Left main artery: Angiography shows no disease.      LAD   Proximal left anterior descending: Angiography shows severe  atherosclerosis. There is an 80 % stenosis. Mid left anterior  descending: Angiography shows moderate atherosclerosis. There is a 60  % stenosis.    CX   Circumflex: Angiography shows moderate atherosclerosis. Instant  wave-free ratio was performed. Based on the results of this  study, the lesion was judged to be non-significant and no intervention  was performed.    RCA   Proximal right coronary artery: Angiography shows mild  atherosclerosis.      Conclusions:   Severe atherosclerosis of the pLAD status post successful PCI with TITUS  x 2. There is moderate atherosclerosis of the LCx  (iFR negative) and mLAD.

## 2023-02-10 NOTE — CONSULT NOTE ADULT - ASSESSMENT
- Continue trending hs-Tn and EKG q4-6h x3 or to peak  - Would plan for possible stress test in AM  - Continue to monitor on telemetry Ms Rdz is a 72-year-old female CAD s/p PCI to pLAD x2 1/10/23, HTN, HLD, who presents with chest pain c/f UA. In the ER, VSS and initial hs-Tn <6. EKG with NSR.     - Continue trending hs-Tn and EKG q4-6h x3 or to peak  - Would plan for possible stress test in AM  - Continue to monitor on telemetry Ms Rdz is a 72-year-old female CAD s/p PCI to pLAD x2 1/10/23, HTN, HLD, who presents with chest pain c/f UA. In the ER, VSS and initial hs-Tn <6. EKG with NSR and no acute ischemic abnormalities.       - Continue trending hs-Tn and EKG q4-6h x3 or to peak  - Would plan for possible stress test in AM  - Continue to monitor on telemetry

## 2023-02-10 NOTE — DISCHARGE NOTE NURSING/CASE MANAGEMENT/SOCIAL WORK - PATIENT PORTAL LINK FT
You can access the FollowMyHealth Patient Portal offered by [facility] by registering at the following website: [email or web address]. By joining [employer]’s FollowMyHealth portal, you will also be able to view your health information using other applications (apps) compatible with our system.

## 2023-02-10 NOTE — H&P ADULT - CLICK TO LAUNCH ORM
Assessment:   Demand ischemia secondary to hypotension eric-op  Troponins improving    Plan:   Echocardiogram pending  As per cardiology
Assessment:   New-onset atrial fibrillation with RVR overnight  Currently in NSR this a m      Plan:   Cardiology evaluation in progress  Continue beta blockers
Assessment:   S/P right carotid endarterectomy with right retrograde innominate artery stenting 1/27/17    Plan:   Continue aspirin, Plavix and Lipitor  Neurovascular exam stable
.

## 2023-02-10 NOTE — DISCHARGE NOTE PROVIDER - NSDCFUSCHEDAPPT_GEN_ALL_CORE_FT
Isabel Polk Physician [facility]  CARDIOLOGY [id number]  Scheduled Appointment: 03/22/2023

## 2023-02-22 ENCOUNTER — RX RENEWAL (OUTPATIENT)
Age: 73
End: 2023-02-22

## 2023-03-22 ENCOUNTER — NON-APPOINTMENT (OUTPATIENT)
Age: 73
End: 2023-03-22

## 2023-03-22 ENCOUNTER — APPOINTMENT (OUTPATIENT)
Dept: CARDIOLOGY | Facility: CLINIC | Age: 73
End: 2023-03-22

## 2023-03-22 ENCOUNTER — APPOINTMENT (OUTPATIENT)
Dept: CARDIOLOGY | Facility: CLINIC | Age: 73
End: 2023-03-22
Payer: MEDICARE

## 2023-03-22 VITALS
DIASTOLIC BLOOD PRESSURE: 75 MMHG | WEIGHT: 137 LBS | HEIGHT: 63 IN | RESPIRATION RATE: 12 BRPM | BODY MASS INDEX: 24.27 KG/M2 | SYSTOLIC BLOOD PRESSURE: 146 MMHG | HEART RATE: 78 BPM | OXYGEN SATURATION: 99 %

## 2023-03-22 DIAGNOSIS — K59.00 CONSTIPATION, UNSPECIFIED: ICD-10-CM

## 2023-03-22 PROCEDURE — 93000 ELECTROCARDIOGRAM COMPLETE: CPT

## 2023-03-22 PROCEDURE — 99214 OFFICE O/P EST MOD 30 MIN: CPT

## 2023-03-22 RX ORDER — LACTOBACILLUS RHAMNOSUS GG 10B CELL
CAPSULE ORAL
Refills: 0 | Status: ACTIVE | COMMUNITY
Start: 2023-03-22

## 2023-03-22 NOTE — REVIEW OF SYSTEMS
[Nausea] : nausea [Negative] : Heme/Lymph [SOB] : no shortness of breath [Dyspnea on exertion] : not dyspnea during exertion [Chest Discomfort] : no chest discomfort [Lower Ext Edema] : no extremity edema [Palpitations] : no palpitations

## 2023-03-22 NOTE — HISTORY OF PRESENT ILLNESS
[FreeTextEntry1] : Fatmata is tolerating increase in amlodipine.  However she is extremely constipated and concerned it is from medications.  She needs to have dental work and is concerned about timing.  She bleeds when she has a cleaning.  Also was supposed to have a breast nodule removed and and never had it done.

## 2023-03-22 NOTE — DISCUSSION/SUMMARY
[FreeTextEntry1] : The patient is a 72-year-old female HTN, HLD, CAD s/p LAD stent with constipation which may be secondary to amlodipine\par #1 CV- c/w DAPT, right radial hematoma, warm compress bid, moderate Cx disease as well, EF=70%, repeat cath for persistent chest pain negative, chest discomfort is musculoskeletal in origin\par #2 HTN-decrease amlodipine to 5 mg daily and start losartan 25 mg with the goal to discontinue amlodipine secondary to constipation\par #3 HLD- c/w atorvastatin 20mg \par #4 GI-agree with Culturelle for constipation\par #5 General- encouraged to increase walking for exercise.  No contraindication to dental cleaning.  Encouraged to make an appointment with her breast surgeon with further recommendations about anticoagulation once a date is established.\par  [EKG obtained to assist in diagnosis and management of assessed problem(s)] : EKG obtained to assist in diagnosis and management of assessed problem(s)

## 2023-04-06 DIAGNOSIS — Z00.00 ENCOUNTER FOR GENERAL ADULT MEDICAL EXAMINATION W/OUT ABNORMAL FINDINGS: ICD-10-CM

## 2023-04-14 LAB
ALBUMIN SERPL ELPH-MCNC: 4.3 G/DL
ALP BLD-CCNC: 168 U/L
ALT SERPL-CCNC: 21 U/L
ANION GAP SERPL CALC-SCNC: 15 MMOL/L
AST SERPL-CCNC: 18 U/L
BASOPHILS # BLD AUTO: 0.06 K/UL
BASOPHILS NFR BLD AUTO: 1 %
BILIRUB SERPL-MCNC: 0.3 MG/DL
BUN SERPL-MCNC: 13 MG/DL
CALCIUM SERPL-MCNC: 9.7 MG/DL
CHLORIDE SERPL-SCNC: 105 MMOL/L
CHOLEST SERPL-MCNC: 140 MG/DL
CO2 SERPL-SCNC: 21 MMOL/L
CREAT SERPL-MCNC: 0.79 MG/DL
EGFR: 80 ML/MIN/1.73M2
EOSINOPHIL # BLD AUTO: 0.18 K/UL
EOSINOPHIL NFR BLD AUTO: 2.9 %
ESTIMATED AVERAGE GLUCOSE: 114 MG/DL
GLUCOSE SERPL-MCNC: 85 MG/DL
HBA1C MFR BLD HPLC: 5.6 %
HCT VFR BLD CALC: 38.1 %
HDLC SERPL-MCNC: 44 MG/DL
HGB BLD-MCNC: 12.7 G/DL
IMM GRANULOCYTES NFR BLD AUTO: 0.2 %
LDLC SERPL CALC-MCNC: 67 MG/DL
LYMPHOCYTES # BLD AUTO: 2.25 K/UL
LYMPHOCYTES NFR BLD AUTO: 36.2 %
MAN DIFF?: NORMAL
MCHC RBC-ENTMCNC: 29.8 PG
MCHC RBC-ENTMCNC: 33.3 GM/DL
MCV RBC AUTO: 89.4 FL
MONOCYTES # BLD AUTO: 0.43 K/UL
MONOCYTES NFR BLD AUTO: 6.9 %
NEUTROPHILS # BLD AUTO: 3.28 K/UL
NEUTROPHILS NFR BLD AUTO: 52.8 %
NONHDLC SERPL-MCNC: 97 MG/DL
PLATELET # BLD AUTO: 228 K/UL
POTASSIUM SERPL-SCNC: 4.4 MMOL/L
PROT SERPL-MCNC: 7.2 G/DL
RBC # BLD: 4.26 M/UL
RBC # FLD: 13.3 %
SODIUM SERPL-SCNC: 142 MMOL/L
TRIGL SERPL-MCNC: 146 MG/DL
WBC # FLD AUTO: 6.21 K/UL

## 2023-04-27 ENCOUNTER — NON-APPOINTMENT (OUTPATIENT)
Age: 73
End: 2023-04-27

## 2023-04-27 ENCOUNTER — APPOINTMENT (OUTPATIENT)
Dept: CARDIOLOGY | Facility: CLINIC | Age: 73
End: 2023-04-27
Payer: MEDICARE

## 2023-04-27 VITALS
HEART RATE: 84 BPM | OXYGEN SATURATION: 99 % | DIASTOLIC BLOOD PRESSURE: 73 MMHG | SYSTOLIC BLOOD PRESSURE: 132 MMHG | WEIGHT: 138 LBS | HEIGHT: 63 IN | RESPIRATION RATE: 12 BRPM | BODY MASS INDEX: 24.45 KG/M2

## 2023-04-27 PROCEDURE — 99214 OFFICE O/P EST MOD 30 MIN: CPT

## 2023-04-27 PROCEDURE — 93000 ELECTROCARDIOGRAM COMPLETE: CPT

## 2023-04-27 NOTE — HISTORY OF PRESENT ILLNESS
[FreeTextEntry1] : Fatmata is having jabbing pains different parts of her chest which are new and gets winded. Up the stairs and caring for baby gets winded. Taking metamucil and colace.

## 2023-06-11 ENCOUNTER — APPOINTMENT (OUTPATIENT)
Dept: MRI IMAGING | Facility: IMAGING CENTER | Age: 73
End: 2023-06-11
Payer: MEDICARE

## 2023-06-11 ENCOUNTER — OUTPATIENT (OUTPATIENT)
Dept: OUTPATIENT SERVICES | Facility: HOSPITAL | Age: 73
LOS: 1 days | End: 2023-06-11
Payer: MEDICARE

## 2023-06-11 DIAGNOSIS — Z00.8 ENCOUNTER FOR OTHER GENERAL EXAMINATION: ICD-10-CM

## 2023-06-11 PROCEDURE — 72197 MRI PELVIS W/O & W/DYE: CPT | Mod: 26,MH

## 2023-06-11 PROCEDURE — A9585: CPT

## 2023-06-11 PROCEDURE — 72197 MRI PELVIS W/O & W/DYE: CPT | Mod: MH

## 2023-06-23 ENCOUNTER — APPOINTMENT (OUTPATIENT)
Dept: GYNECOLOGIC ONCOLOGY | Facility: CLINIC | Age: 73
End: 2023-06-23
Payer: MEDICARE

## 2023-06-23 VITALS
WEIGHT: 137 LBS | HEIGHT: 63 IN | BODY MASS INDEX: 24.27 KG/M2 | HEART RATE: 91 BPM | DIASTOLIC BLOOD PRESSURE: 78 MMHG | SYSTOLIC BLOOD PRESSURE: 144 MMHG

## 2023-06-23 PROCEDURE — 99205 OFFICE O/P NEW HI 60 MIN: CPT

## 2023-06-23 NOTE — DISCUSSION/SUMMARY
[FreeTextEntry1] : 72 year old with enlarging complex adnexal mass\par \par I discussed management options with Ms. YUN and her  in detail.  We discussed that ovarian malignancy can only be diagnosed definitively after surgical excision.  Options of management include surgical excision versus continued close radiographic surveillance.  Await results of CA-125.  Risks of surgery include bleeding/blood transfusion/infection/injury to bowel/bladder/ureter/blood vessels/conversion to laparotomy.  Risks of observation include disease progression.  \par \par I answered her questions to her apparent satisfaction.  She would like to proceed with surgery.  The plan will be for laparoscopic BSO with possible hysterectomy and staging.  Discussed possibility of  postoperative adjuvant chemotherapy.  We also discussed the limitations of frozen section pathology evaluation and possible return to operating room for staging if frozen section is false negative. Postoperative expectations and recovery were discussed in detail.  She will be referred for preoperative medical clearance.  Surgery will be scheduled in the near future.\par

## 2023-06-23 NOTE — OB HISTORY
[Total Preg ___] : : [unfilled] [Living ___] : [unfilled] (living) [Vaginal ___] : [unfilled] vaginal delivery(s) [Definite ___ (Date)] : the last menstrual period was [unfilled] [Menarche Age ____] : age at menarche was [unfilled] [Menopause  Age ____] : menopause occurred at age [unfilled]

## 2023-06-23 NOTE — HISTORY OF PRESENT ILLNESS
[FreeTextEntry1] : GYN:  Tanya Trejo\par PCP:  Basil Grider\par Cards: Isabel Felicitas\par GI:  Mando Akins\par Neurologist: Annabelle Morris\par Hematologist: Claudio Yang\par \par Ms. Rdz is a postmenopausal 72 year old  female, referred by Dr. Trejo,  for an ovarian cyst.  She has a known ovarian cyst.  She has on off pelvic pain.  TVUS on 5/3/23, Uterus- 5.8 x 2.9 x 4.2 cm, EMS- 3 mm\par RTO- 3.2 x 1.9 x 3.4 cm, there is a large complex cystic structure seen within the right ovary measuring approximately 2.6 x 1.5 x 2.3 cm.  This contains multiple septations as well as polypoid like projections extending of some of the septations.  This structure is increased in size compared to the prior exam.  \par \par Pelvic MRI on 23, notes right ovary, contains a multilocular cystic mass measuring 2.3 x 2.0 x 2.2 cm, containing numerous thin septations and and a 12 mm solid component along its medial aspect, left ovary WNL, no adenopathy\par \par Tumor markers drawn, awaiting results.\par \par PMHx- CAD, HLD, HTN, she had 2 stents placed in January\par PSHx- Left breast biopsy\par Family hx of cancer- brother had leukemia, maternal cousins with breast cancer\par \par She denies dyspnea or chest pain, vaginal bleeding or discharge, nausea/vomiting, changes in bowel habits or urination, lower extremity edema or pain.  She denies all other associated signs and symptoms.  She is here to discuss further management.  \par \par HM-\par Pap- negative per patient \par Mammo/ MRI was negative per patient\par Colonoscopy- 2018- negative per patient

## 2023-06-23 NOTE — DISCHARGE NOTE PROVIDER - NSDCCPGOAL_GEN_ALL_CORE_FT
Addended by: Qasim York on: 6/23/2023 11:42 AM     Modules accepted: Orders
To get better and follow your care plan as instructed.

## 2023-07-17 ENCOUNTER — APPOINTMENT (OUTPATIENT)
Dept: CARDIOLOGY | Facility: CLINIC | Age: 73
End: 2023-07-17
Payer: MEDICARE

## 2023-07-17 ENCOUNTER — NON-APPOINTMENT (OUTPATIENT)
Age: 73
End: 2023-07-17

## 2023-07-17 VITALS
SYSTOLIC BLOOD PRESSURE: 132 MMHG | OXYGEN SATURATION: 98 % | HEART RATE: 67 BPM | DIASTOLIC BLOOD PRESSURE: 74 MMHG | BODY MASS INDEX: 24.27 KG/M2 | WEIGHT: 137 LBS | RESPIRATION RATE: 12 BRPM

## 2023-07-17 PROCEDURE — 93000 ELECTROCARDIOGRAM COMPLETE: CPT

## 2023-07-17 PROCEDURE — 99214 OFFICE O/P EST MOD 30 MIN: CPT

## 2023-07-17 RX ORDER — MIRTAZAPINE 45 MG/1
45 TABLET, FILM COATED ORAL
Refills: 0 | Status: DISCONTINUED | COMMUNITY
End: 2023-07-17

## 2023-07-17 RX ORDER — PSYLLIUM SEED
PACKET (EA) ORAL
Refills: 0 | Status: DISCONTINUED | COMMUNITY
End: 2023-07-17

## 2023-07-17 RX ORDER — MULTIVITAMIN
TABLET ORAL
Refills: 0 | Status: DISCONTINUED | COMMUNITY
End: 2023-07-17

## 2023-07-17 NOTE — HISTORY OF PRESENT ILLNESS
[FreeTextEntry1] : Fatmata is very nervous about upcoming surgery on ovarian mass. No CP, occasional skip when nervous but no sustained palpitations or SOB. \par Surgery: laparoscopic BSO and possible hysterectomy with stagin\par Date:7/25/23 \par Surgeon: Dr. Madelaine Jeffery

## 2023-07-17 NOTE — DISCUSSION/SUMMARY
[FreeTextEntry1] : The patient is a 72-year-old female HTN, HLD, CAD awaiting surgery on ovarian mass.\par #1 CV- c/w DAPT, ECHO 1/2023 EF=70%\par #2 HTN- c/w losartan 50 mg \par #3 HLD- c/w atorvastatin 20mg \par #4 GI-  Culturelle for constipation\par #5 Gyn- There are no cardiac contraindications to planned surgery off clopidogrel for one week prior and continue aspirin. \par  [EKG obtained to assist in diagnosis and management of assessed problem(s)] : EKG obtained to assist in diagnosis and management of assessed problem(s)

## 2023-07-18 ENCOUNTER — OUTPATIENT (OUTPATIENT)
Dept: OUTPATIENT SERVICES | Facility: HOSPITAL | Age: 73
LOS: 1 days | End: 2023-07-18

## 2023-07-18 VITALS
DIASTOLIC BLOOD PRESSURE: 74 MMHG | HEIGHT: 63 IN | TEMPERATURE: 98 F | SYSTOLIC BLOOD PRESSURE: 136 MMHG | HEART RATE: 66 BPM | WEIGHT: 138.01 LBS | OXYGEN SATURATION: 98 % | RESPIRATION RATE: 16 BRPM

## 2023-07-18 DIAGNOSIS — I10 ESSENTIAL (PRIMARY) HYPERTENSION: ICD-10-CM

## 2023-07-18 DIAGNOSIS — Z91.89 OTHER SPECIFIED PERSONAL RISK FACTORS, NOT ELSEWHERE CLASSIFIED: ICD-10-CM

## 2023-07-18 DIAGNOSIS — Z95.5 PRESENCE OF CORONARY ANGIOPLASTY IMPLANT AND GRAFT: ICD-10-CM

## 2023-07-18 DIAGNOSIS — N83.8 OTHER NONINFLAMMATORY DISORDERS OF OVARY, FALLOPIAN TUBE AND BROAD LIGAMENT: ICD-10-CM

## 2023-07-18 DIAGNOSIS — Z98.890 OTHER SPECIFIED POSTPROCEDURAL STATES: Chronic | ICD-10-CM

## 2023-07-18 LAB
A1C WITH ESTIMATED AVERAGE GLUCOSE RESULT: 5.5 % — SIGNIFICANT CHANGE UP (ref 4–5.6)
ALBUMIN SERPL ELPH-MCNC: 4.7 G/DL — SIGNIFICANT CHANGE UP (ref 3.3–5)
ALP SERPL-CCNC: 150 U/L — HIGH (ref 40–120)
ALT FLD-CCNC: 18 U/L — SIGNIFICANT CHANGE UP (ref 4–33)
ANION GAP SERPL CALC-SCNC: 15 MMOL/L — HIGH (ref 7–14)
AST SERPL-CCNC: 16 U/L — SIGNIFICANT CHANGE UP (ref 4–32)
BILIRUB SERPL-MCNC: 0.3 MG/DL — SIGNIFICANT CHANGE UP (ref 0.2–1.2)
BLD GP AB SCN SERPL QL: NEGATIVE — SIGNIFICANT CHANGE UP
BUN SERPL-MCNC: 13 MG/DL — SIGNIFICANT CHANGE UP (ref 7–23)
CALCIUM SERPL-MCNC: 10.1 MG/DL — SIGNIFICANT CHANGE UP (ref 8.4–10.5)
CHLORIDE SERPL-SCNC: 104 MMOL/L — SIGNIFICANT CHANGE UP (ref 98–107)
CO2 SERPL-SCNC: 22 MMOL/L — SIGNIFICANT CHANGE UP (ref 22–31)
CREAT SERPL-MCNC: 0.71 MG/DL — SIGNIFICANT CHANGE UP (ref 0.5–1.3)
EGFR: 91 ML/MIN/1.73M2 — SIGNIFICANT CHANGE UP
ESTIMATED AVERAGE GLUCOSE: 111 — SIGNIFICANT CHANGE UP
GLUCOSE SERPL-MCNC: 97 MG/DL — SIGNIFICANT CHANGE UP (ref 70–99)
HCT VFR BLD CALC: 41.2 % — SIGNIFICANT CHANGE UP (ref 34.5–45)
HGB BLD-MCNC: 13.3 G/DL — SIGNIFICANT CHANGE UP (ref 11.5–15.5)
MCHC RBC-ENTMCNC: 28.9 PG — SIGNIFICANT CHANGE UP (ref 27–34)
MCHC RBC-ENTMCNC: 32.3 GM/DL — SIGNIFICANT CHANGE UP (ref 32–36)
MCV RBC AUTO: 89.6 FL — SIGNIFICANT CHANGE UP (ref 80–100)
NRBC # BLD: 0 /100 WBCS — SIGNIFICANT CHANGE UP (ref 0–0)
NRBC # FLD: 0 K/UL — SIGNIFICANT CHANGE UP (ref 0–0)
PLATELET # BLD AUTO: 260 K/UL — SIGNIFICANT CHANGE UP (ref 150–400)
POTASSIUM SERPL-MCNC: 4.6 MMOL/L — SIGNIFICANT CHANGE UP (ref 3.5–5.3)
POTASSIUM SERPL-SCNC: 4.6 MMOL/L — SIGNIFICANT CHANGE UP (ref 3.5–5.3)
PROT SERPL-MCNC: 8.1 G/DL — SIGNIFICANT CHANGE UP (ref 6–8.3)
RBC # BLD: 4.6 M/UL — SIGNIFICANT CHANGE UP (ref 3.8–5.2)
RBC # FLD: 13.4 % — SIGNIFICANT CHANGE UP (ref 10.3–14.5)
RH IG SCN BLD-IMP: POSITIVE — SIGNIFICANT CHANGE UP
SODIUM SERPL-SCNC: 141 MMOL/L — SIGNIFICANT CHANGE UP (ref 135–145)
WBC # BLD: 5.16 K/UL — SIGNIFICANT CHANGE UP (ref 3.8–10.5)
WBC # FLD AUTO: 5.16 K/UL — SIGNIFICANT CHANGE UP (ref 3.8–10.5)

## 2023-07-18 RX ORDER — SODIUM CHLORIDE 9 MG/ML
3 INJECTION INTRAMUSCULAR; INTRAVENOUS; SUBCUTANEOUS EVERY 8 HOURS
Refills: 0 | Status: DISCONTINUED | OUTPATIENT
Start: 2023-07-25 | End: 2023-08-08

## 2023-07-18 RX ORDER — SODIUM CHLORIDE 9 MG/ML
1000 INJECTION, SOLUTION INTRAVENOUS
Refills: 0 | Status: DISCONTINUED | OUTPATIENT
Start: 2023-07-25 | End: 2023-08-08

## 2023-07-18 RX ORDER — CHLORHEXIDINE GLUCONATE 213 G/1000ML
1 SOLUTION TOPICAL ONCE
Refills: 0 | Status: DISCONTINUED | OUTPATIENT
Start: 2023-07-25 | End: 2023-08-08

## 2023-07-18 NOTE — H&P PST ADULT - NS HP PST ANES REACTION
I think most of her sxs are from drinking  I agree w/ what you said as I said the same in the office    Claudine Qureshi No

## 2023-07-18 NOTE — H&P PST ADULT - PROBLEM SELECTOR PLAN 1
Patient tentatively scheduled for laparoscopic bilateral salpingo oophorectomy possible hysterectomy staging for 7/25/23. Pre-op instructions provided. Pt given verbal and written instructions with teach back on chlorhexidine shampoo and pepcid. Pt verbalized understanding with return demonstration.     CBC CMP A1C T&S done.

## 2023-07-18 NOTE — H&P PST ADULT - HISTORY OF PRESENT ILLNESS
74 y/o female presents to presurgical testing with diagnosis of other noninflammatory disease of ovary and fallopian tube. Pt with right ovarian cyst, occasional pelvic pain. Pt denies vaginal bleeding. Pt is scheduled for a laparoscopic bilateral salpingo oophorectomy possible hysterectomy staging.

## 2023-07-18 NOTE — H&P PST ADULT - PROBLEM SELECTOR PLAN 3
Copy of cardiac evaluation in chart.    Pt states she was instructed take last dose of Plavix today 7/18/23, and continue ASA for cardiac protection.

## 2023-07-18 NOTE — H&P PST ADULT - NSICDXPASTMEDICALHX_GEN_ALL_CORE_FT
PAST MEDICAL HISTORY:  CAD (coronary artery disease)     Cyst of right ovary     H/O constipation     HLD (hyperlipidemia)     HTN (hypertension)     Right ovarian cyst     Stented coronary artery

## 2023-07-24 ENCOUNTER — TRANSCRIPTION ENCOUNTER (OUTPATIENT)
Age: 73
End: 2023-07-24

## 2023-07-24 NOTE — ASU PATIENT PROFILE, ADULT - FALL HARM RISK - UNIVERSAL INTERVENTIONS
Bed in lowest position, wheels locked, appropriate side rails in place/Call bell, personal items and telephone in reach/Instruct patient to call for assistance before getting out of bed or chair/Non-slip footwear when patient is out of bed/[place] to call system/Physically safe environment - no spills, clutter or unnecessary equipment/Purposeful Proactive Rounding/Room/bathroom lighting operational, light cord in reach

## 2023-07-25 ENCOUNTER — APPOINTMENT (OUTPATIENT)
Dept: GYNECOLOGIC ONCOLOGY | Facility: HOSPITAL | Age: 73
End: 2023-07-25

## 2023-07-25 ENCOUNTER — TRANSCRIPTION ENCOUNTER (OUTPATIENT)
Age: 73
End: 2023-07-25

## 2023-07-25 ENCOUNTER — RESULT REVIEW (OUTPATIENT)
Age: 73
End: 2023-07-25

## 2023-07-25 ENCOUNTER — OUTPATIENT (OUTPATIENT)
Dept: OUTPATIENT SERVICES | Facility: HOSPITAL | Age: 73
LOS: 1 days | Discharge: HOME | End: 2023-07-25
Payer: MEDICARE

## 2023-07-25 VITALS
SYSTOLIC BLOOD PRESSURE: 119 MMHG | HEART RATE: 83 BPM | OXYGEN SATURATION: 100 % | DIASTOLIC BLOOD PRESSURE: 59 MMHG | RESPIRATION RATE: 16 BRPM

## 2023-07-25 VITALS
DIASTOLIC BLOOD PRESSURE: 68 MMHG | OXYGEN SATURATION: 100 % | HEART RATE: 65 BPM | TEMPERATURE: 98 F | HEIGHT: 63 IN | SYSTOLIC BLOOD PRESSURE: 144 MMHG | WEIGHT: 138.01 LBS | RESPIRATION RATE: 17 BRPM

## 2023-07-25 DIAGNOSIS — Z98.890 OTHER SPECIFIED POSTPROCEDURAL STATES: Chronic | ICD-10-CM

## 2023-07-25 DIAGNOSIS — N83.8 OTHER NONINFLAMMATORY DISORDERS OF OVARY, FALLOPIAN TUBE AND BROAD LIGAMENT: ICD-10-CM

## 2023-07-25 PROCEDURE — 58661 LAPAROSCOPY REMOVE ADNEXA: CPT | Mod: 50

## 2023-07-25 PROCEDURE — 88112 CYTOPATH CELL ENHANCE TECH: CPT | Mod: 26

## 2023-07-25 PROCEDURE — 88331 PATH CONSLTJ SURG 1 BLK 1SPC: CPT | Mod: 26

## 2023-07-25 PROCEDURE — 88305 TISSUE EXAM BY PATHOLOGIST: CPT | Mod: 26

## 2023-07-25 RX ORDER — FENTANYL CITRATE 50 UG/ML
25 INJECTION INTRAVENOUS
Refills: 0 | Status: DISCONTINUED | OUTPATIENT
Start: 2023-07-25 | End: 2023-07-25

## 2023-07-25 RX ORDER — OXYCODONE HYDROCHLORIDE 5 MG/1
1 TABLET ORAL
Qty: 3 | Refills: 0
Start: 2023-07-25

## 2023-07-25 RX ORDER — SODIUM CHLORIDE 9 MG/ML
1000 INJECTION, SOLUTION INTRAVENOUS
Refills: 0 | Status: DISCONTINUED | OUTPATIENT
Start: 2023-07-25 | End: 2023-08-08

## 2023-07-25 RX ADMIN — SODIUM CHLORIDE 125 MILLILITER(S): 9 INJECTION, SOLUTION INTRAVENOUS at 11:00

## 2023-07-25 NOTE — ASU DISCHARGE PLAN (ADULT/PEDIATRIC) - SHOWER ONLY DURATION DAY(S)
6 weeks or until cleared by Dr. Jeffery Alternatives Discussed Intro (Do Not Add Period): I discussed alternative treatments to Mohs surgery and specifically discussed the risks and benefits of

## 2023-07-25 NOTE — ASU DISCHARGE PLAN (ADULT/PEDIATRIC) - PROVIDER TOKENS
PROVIDER:[TOKEN:[[id number]],SCHEDULEDAPPT:[08/11/2023],SCHEDULEDAPPTTIME:[12:00 PM],ESTABLISHEDPATIENT:[T]]

## 2023-07-25 NOTE — ASU DISCHARGE PLAN (ADULT/PEDIATRIC) - NURSING INSTRUCTIONS
DO NOT take any Tylenol (Acetaminophen) or narcotics containing Tylenol until after 3:00PM. You received Tylenol during your operation and it can cause damage to your liver if too much is taken within a 24 hour time period.  You received IV Toradol for pain management at 10:00AM . Please DO NOT take Motrin/Ibuprofen/Advil/Aleve/NSAIDs (Non-Steroidal Anti-Inflammatory Drugs) for the next 6 hours (until 4:00PM).

## 2023-07-25 NOTE — CHART NOTE - NSCHARTNOTEFT_GEN_A_CORE
PA POST OP NOTE:       SUBJECTIVE:  Patient seen and examined at bedside. Patient was asleep but aroused easily . Reports mild crampy pain at this time. Denies c/o nausea, vomiting, sob, cp or palpitations. Fior sips of water. Still DTV.    Vital Signs Last 24 Hrs  T(C): 36.7 (25 Jul 2023 11:30), Max: 36.7 (25 Jul 2023 11:30)  T(F): 98 (25 Jul 2023 11:30), Max: 98 (25 Jul 2023 11:30)  HR: 69 (25 Jul 2023 12:30) (61 - 85)  BP: 119/58 (25 Jul 2023 12:15) (105/75 - 144/68)  BP(mean): 73 (25 Jul 2023 12:15) (69 - 79)  RR: 16 (25 Jul 2023 12:30) (13 - 19)  SpO2: 97% (25 Jul 2023 12:30) (96% - 100%)    Parameters below as of 25 Jul 2023 12:00  Patient On (Oxygen Delivery Method): room air          PHYSICAL EXAM:    LUNGS: Clear to auscultation bilaterally; No wheezing.  HEART: Regular, rate and rhythm; No murmurs.  ABDOMEN: Soft, + BS, nondistended and appropriately tender on palpation.  INCISION:  Scope sites intact. Dressings clean and dry.  EXTREMITIES: No swelling or calf tenderness bilaterally. Venodynes in place.  BELL:  Removed in OR.      MEDICATIONS  (STANDING):  chlorhexidine 2% Cloths 1 Application(s) Topical once  lactated ringers. 1000 milliLiter(s) (125 mL/Hr) IV Continuous <Continuous>  lactated ringers. 1000 milliLiter(s) (30 mL/Hr) IV Continuous <Continuous>  sodium chloride 0.9% lock flush 3 milliLiter(s) IV Push every 8 hours    MEDICATIONS  (PRN):  fentaNYL    Injectable 25 MICROGram(s) IV Push every 5 minutes PRN Moderate Pain (4 - 6)      ASSESMENT/PLAN: 74y/o POD#0  from [facility] BSO in stable condition.    1.Clears to Regular diet as tolerate.  2. IVF: RL @125cc/hr.  3. Activity: Out of bed with assist.  4. Vital Signs Q routine.  5. Pulm: pulse Ox monitoring and encourage incentive spirometer use.  6.  Pain meds as ordered.  7. DTV by 6:15pm.  8. Evaluate for discharge home when tolerates diet, voids, ambulates and vss.

## 2023-07-25 NOTE — BRIEF OPERATIVE NOTE - OPERATION/FINDINGS
Exam under anesthesia:   - normal external genitalia  - 6 week size anteverted uterus  - vagina with atrophic mucosa and cervix appeared normal      Intraabdominal findings:  - grossly normal ovaries and fallopian tubes bilaterally  - uterus non-enlarged and smooth with no exophytic masses

## 2023-07-25 NOTE — ASU DISCHARGE PLAN (ADULT/PEDIATRIC) - ASU DC SPECIAL INSTRUCTIONSFT
Postoperative Instructions      Pain control     For pain control, take the followin. Motrin 600mg four times a day, take with food  2. Add Tylenol 975 four times a day, alternated with motrin    Motrin and Tylenol can be obtained over the counter.      Incision Care  Keep your incision(s) clean and dry. It is ok to shower, but do not scrub the incision sites--just allow the water to gently wash over your skin. Remove the outer dressing(s)--the band-aids--the second day after your surgery. There are small pieces of tape called steri-strips over the incisions underneath these dressings. Steri strips will be removed at your postoperative appointment.    Postoperative restrictions   Do not drive or make important decisions for 24 hours after anesthesia. You may feel drowsy for 24 hours and should have a responsible adult with you during that time. Nothing in the vagina (tampons, sexual intercourse), No tub baths, pools or hot tubs for 6 weeks (showers are ok!). No lifting anything heavier than 15 lbs, no strenuous exercise for 6 weeks after surgery. Do not pull or cut any stitches that you see around your incision.         Vaginal bleeding   Spotting and intermittent passage of blood clots per vagina is normal in first few weeks after surgery. If you are soaking 1 pad per hour, that is not normal and you should notify Dr. Jeffery's office and seek medical attention right away.        Signs of Infection  Some postoperative pain and discomfort is normal. However, if you experience any of the following, you may be developing an infection and should be seen by your doctor: pain that does not get better with the oral medications listed above, fever greater than 100.4F, foul smelling discharge coming from the surgical site, nausea and vomiting that does not stop (especially if you are unable to tolerate oral intake), or inability to urinate. If you experience any of these symptoms, call your doctor's office to be seen right away.    Follow Up  Please attend your post operative appointment on 2023 at 12 pm with Dr. Jeffery. The results of your surgery will be discussed with you at that time. Postoperative Instructions      Pain control     For pain control, take the followin. Motrin 600mg four times a day, take with food  2. Add Tylenol 975 four times a day, alternated with motrin  3. If your pain is not well controlled after you've taken Motrin and Tylenol, you may take one tablet of Oxycodone 5mg for breakthrough pain.     Motrin and Tylenol can be obtained over the counter.      Incision Care  Keep your incision(s) clean and dry. It is ok to shower, but do not scrub the incision sites--just allow the water to gently wash over your skin. Remove the outer dressing(s)--the band-aids--the second day after your surgery. There are small pieces of tape called steri-strips over the incisions underneath these dressings. Steri strips will be removed at your postoperative appointment.    Postoperative restrictions   Do not drive or make important decisions for 24 hours after anesthesia. You may feel drowsy for 24 hours and should have a responsible adult with you during that time. Nothing in the vagina (tampons, sexual intercourse), No tub baths, pools or hot tubs for 6 weeks (showers are ok!). No lifting anything heavier than 15 lbs, no strenuous exercise for 6 weeks after surgery. Do not pull or cut any stitches that you see around your incision.         Vaginal bleeding   Spotting and intermittent passage of blood clots per vagina is normal in first few weeks after surgery. If you are soaking 1 pad per hour, that is not normal and you should notify Dr. Jeffery's office and seek medical attention right away.        Signs of Infection  Some postoperative pain and discomfort is normal. However, if you experience any of the following, you may be developing an infection and should be seen by your doctor: pain that does not get better with the oral medications listed above, fever greater than 100.4F, foul smelling discharge coming from the surgical site, nausea and vomiting that does not stop (especially if you are unable to tolerate oral intake), or inability to urinate. If you experience any of these symptoms, call your doctor's office to be seen right away.    Follow Up  Please attend your post operative appointment on 2023 at 12 pm with Dr. Jeffery. The results of your surgery will be discussed with you at that time.

## 2023-07-25 NOTE — ASU PREOP CHECKLIST - SELECT TESTS ORDERED
FS 99/POCT Blood Glucose FS 99 t 6:27am/POCT Blood Glucose FS 99 t 6:27am/BMP/CMP/Type and Cross/Type and Screen/EKG/POCT Blood Glucose

## 2023-07-25 NOTE — BRIEF OPERATIVE NOTE - NSICDXBRIEFPROCEDURE_GEN_ALL_CORE_FT
PROCEDURES:  Laparoscopic bilateral salpingo-oophorectomy 25-Jul-2023 10:53:46  Mireya Gottlieb  Exam under anesthesia, pelvic 25-Jul-2023 10:53:56  Mireya Gottlieb

## 2023-07-25 NOTE — ASU PATIENT PROFILE, ADULT - HISTORY OF COVID-19 VACCINATION
Telephone Encounter by Margaret Jackson at 02/28/17 12:45 PM     Author:  Margaret Jackson Service:  (none) Author Type:       Filed:  02/28/17 12:46 PM Encounter Date:  2/27/2017 Status:  Signed     :  Margaret Jackson ()            Mom requesting on messages below  Mom states that she is at work until 5:30pm today and can take calls at work number [phone]  Mom states that she needs to hear back as soon as possible, to arrange the  for script[ES1.1M]      Revision History        User Key Date/Time User Provider Type Action    > ES1.1 02/28/17 12:46 PM Margaret Jackson  Sign    M - Manual             Yes

## 2023-07-25 NOTE — ASU DISCHARGE PLAN (ADULT/PEDIATRIC) - CARE PROVIDER_API CALL
Madelaine Jeffery  Gynecologic Oncology  [address] [place] [id number]  Phone: [phone]  Fax: [phone]  Established Patient  Scheduled Appointment: 08/11/2023 12:00 PM

## 2023-07-26 ENCOUNTER — NON-APPOINTMENT (OUTPATIENT)
Age: 73
End: 2023-07-26

## 2023-07-26 LAB — NON-GYNECOLOGICAL CYTOLOGY STUDY: SIGNIFICANT CHANGE UP

## 2023-07-27 ENCOUNTER — APPOINTMENT (OUTPATIENT)
Dept: CARDIOLOGY | Facility: CLINIC | Age: 73
End: 2023-07-27

## 2023-07-28 ENCOUNTER — NON-APPOINTMENT (OUTPATIENT)
Age: 73
End: 2023-07-28

## 2023-08-02 LAB — SURGICAL PATHOLOGY STUDY: SIGNIFICANT CHANGE UP

## 2023-08-11 ENCOUNTER — APPOINTMENT (OUTPATIENT)
Dept: ULTRASOUND IMAGING | Facility: IMAGING CENTER | Age: 73
End: 2023-08-11
Payer: MEDICARE

## 2023-08-11 ENCOUNTER — OUTPATIENT (OUTPATIENT)
Dept: OUTPATIENT SERVICES | Facility: HOSPITAL | Age: 73
LOS: 1 days | End: 2023-08-11
Payer: MEDICARE

## 2023-08-11 ENCOUNTER — NON-APPOINTMENT (OUTPATIENT)
Age: 73
End: 2023-08-11

## 2023-08-11 ENCOUNTER — APPOINTMENT (OUTPATIENT)
Dept: GYNECOLOGIC ONCOLOGY | Facility: CLINIC | Age: 73
End: 2023-08-11
Payer: MEDICARE

## 2023-08-11 VITALS
SYSTOLIC BLOOD PRESSURE: 134 MMHG | TEMPERATURE: 97.9 F | WEIGHT: 137 LBS | DIASTOLIC BLOOD PRESSURE: 72 MMHG | HEART RATE: 70 BPM | BODY MASS INDEX: 24.27 KG/M2

## 2023-08-11 DIAGNOSIS — R60.0 LOCALIZED EDEMA: ICD-10-CM

## 2023-08-11 DIAGNOSIS — Z98.890 OTHER SPECIFIED POSTPROCEDURAL STATES: Chronic | ICD-10-CM

## 2023-08-11 DIAGNOSIS — N83.8 OTHER NONINFLAMMATORY DISORDERS OF OVARY, FALLOPIAN TUBE AND BROAD LIGAMENT: ICD-10-CM

## 2023-08-11 PROBLEM — N83.201 UNSPECIFIED OVARIAN CYST, RIGHT SIDE: Chronic | Status: ACTIVE | Noted: 2023-07-18

## 2023-08-11 PROBLEM — Z87.19 PERSONAL HISTORY OF OTHER DISEASES OF THE DIGESTIVE SYSTEM: Chronic | Status: ACTIVE | Noted: 2023-07-18

## 2023-08-11 PROBLEM — Z95.5 PRESENCE OF CORONARY ANGIOPLASTY IMPLANT AND GRAFT: Chronic | Status: ACTIVE | Noted: 2023-07-18

## 2023-08-11 PROBLEM — I25.10 ATHEROSCLEROTIC HEART DISEASE OF NATIVE CORONARY ARTERY WITHOUT ANGINA PECTORIS: Chronic | Status: ACTIVE | Noted: 2023-07-18

## 2023-08-11 PROCEDURE — 99212 OFFICE O/P EST SF 10 MIN: CPT

## 2023-08-11 PROCEDURE — 93970 EXTREMITY STUDY: CPT | Mod: 26

## 2023-08-11 PROCEDURE — 93970 EXTREMITY STUDY: CPT

## 2023-08-11 NOTE — LETTER BODY
[Dear  ___] : Dear  [unfilled], [I recently saw our patient [unfilled] for a follow-up visit.] : I recently saw our patient, [unfilled] for a follow-up visit. [Attached please find my note.] : Attached please find my note. [FreeTextEntry1] : pathology

## 2023-08-11 NOTE — DISCUSSION/SUMMARY
[Intact] : was intact [None] : had no drainage [Normal Skin] : normal appearance [Normal Skin Turgor] : normal skin turgor [Doing Well] : is doing well [Excellent Pain Control] : has excellent pain control [No Sign of Infection] : is showing no signs of infection [0] : 0 [Reviewed] : reviewed [Clean] : was not clean [Dry] : was not dry [Erythema] : was not erythematous [Ecchymosis] : was not ecchymotic [Seroma] : had no seroma [Firm] : soft [Tender] : nontender [Rebound] : no rebound tenderness [Guarding] : no guarding [Incisional Hernia] : no incisional hernia [Mass] : no palpable mass [de-identified] : deferred no VB [de-identified] : /GI function WNL [de-identified] : postoperative recuperation [FreeTextEntry1] : Healing well  Discussed ongoing recuperation. Reviewed Final pathology results in detail. A copy was given to the patient. Uterus remains in situ We reviewed recommended surveillance plan follow up with regular GYN. Patient stated and expressed a good understanding of the above information. D/t left leg swelling, we discussed differentials a LE Doppler was ordered to r/o thrombus If negative she should discuss further with Cardiologist

## 2023-08-11 NOTE — REASON FOR VISIT
[Post Op] : post op visit [de-identified] : 7/25/23 [de-identified] : Laparoscopic bilateral salpingo-oophorectomy and pelvic exam under anesthesia.   [de-identified] : Normal bowel function. Normal bladder function. No vaginal bleeding or malodorous discharge. No fevers/chills.  Incisions without concern. Recovering well.  Noting inner left calf edema, and pain.

## 2023-10-02 ENCOUNTER — EMERGENCY (EMERGENCY)
Facility: HOSPITAL | Age: 73
LOS: 1 days | Discharge: HOME | End: 2023-10-02
Attending: EMERGENCY MEDICINE
Payer: MEDICARE

## 2023-10-02 VITALS
SYSTOLIC BLOOD PRESSURE: 130 MMHG | HEART RATE: 113 BPM | WEIGHT: 136.91 LBS | DIASTOLIC BLOOD PRESSURE: 77 MMHG | TEMPERATURE: 103 F | HEIGHT: 63 IN | RESPIRATION RATE: 20 BRPM | OXYGEN SATURATION: 95 %

## 2023-10-02 DIAGNOSIS — Z98.890 OTHER SPECIFIED POSTPROCEDURAL STATES: Chronic | ICD-10-CM

## 2023-10-02 PROCEDURE — 99285 EMERGENCY DEPT VISIT HI MDM: CPT

## 2023-10-03 VITALS
RESPIRATION RATE: 18 BRPM | DIASTOLIC BLOOD PRESSURE: 72 MMHG | OXYGEN SATURATION: 98 % | SYSTOLIC BLOOD PRESSURE: 123 MMHG | TEMPERATURE: 98 F | HEART RATE: 83 BPM

## 2023-10-03 LAB
ALBUMIN SERPL ELPH-MCNC: 4.2 G/DL — SIGNIFICANT CHANGE UP (ref 3.3–5)
ALP SERPL-CCNC: 138 U/L — HIGH (ref 40–120)
ALT FLD-CCNC: 21 U/L — SIGNIFICANT CHANGE UP (ref 10–45)
ANION GAP SERPL CALC-SCNC: 14 MMOL/L — SIGNIFICANT CHANGE UP (ref 5–17)
APPEARANCE UR: CLEAR — SIGNIFICANT CHANGE UP
AST SERPL-CCNC: 19 U/L — SIGNIFICANT CHANGE UP (ref 10–40)
BACTERIA # UR AUTO: ABNORMAL
BASE EXCESS BLDV CALC-SCNC: -1.7 MMOL/L — SIGNIFICANT CHANGE UP (ref -2–3)
BASOPHILS # BLD AUTO: 0.05 K/UL — SIGNIFICANT CHANGE UP (ref 0–0.2)
BASOPHILS NFR BLD AUTO: 0.4 % — SIGNIFICANT CHANGE UP (ref 0–2)
BILIRUB SERPL-MCNC: 0.7 MG/DL — SIGNIFICANT CHANGE UP (ref 0.2–1.2)
BILIRUB UR-MCNC: NEGATIVE — SIGNIFICANT CHANGE UP
BUN SERPL-MCNC: 13 MG/DL — SIGNIFICANT CHANGE UP (ref 7–23)
CA-I SERPL-SCNC: 1.18 MMOL/L — SIGNIFICANT CHANGE UP (ref 1.15–1.33)
CALCIUM SERPL-MCNC: 9.7 MG/DL — SIGNIFICANT CHANGE UP (ref 8.4–10.5)
CHLORIDE BLDV-SCNC: 104 MMOL/L — SIGNIFICANT CHANGE UP (ref 96–108)
CHLORIDE SERPL-SCNC: 101 MMOL/L — SIGNIFICANT CHANGE UP (ref 96–108)
CO2 BLDV-SCNC: 23 MMOL/L — SIGNIFICANT CHANGE UP (ref 22–26)
CO2 SERPL-SCNC: 19 MMOL/L — LOW (ref 22–31)
COLOR SPEC: YELLOW — SIGNIFICANT CHANGE UP
CREAT SERPL-MCNC: 0.88 MG/DL — SIGNIFICANT CHANGE UP (ref 0.5–1.3)
DIFF PNL FLD: ABNORMAL
EGFR: 70 ML/MIN/1.73M2 — SIGNIFICANT CHANGE UP
EOSINOPHIL # BLD AUTO: 0 K/UL — SIGNIFICANT CHANGE UP (ref 0–0.5)
EOSINOPHIL NFR BLD AUTO: 0 % — SIGNIFICANT CHANGE UP (ref 0–6)
EPI CELLS # UR: 2 /HPF — SIGNIFICANT CHANGE UP
GAS PNL BLDV: 133 MMOL/L — LOW (ref 136–145)
GAS PNL BLDV: SIGNIFICANT CHANGE UP
GAS PNL BLDV: SIGNIFICANT CHANGE UP
GLUCOSE BLDV-MCNC: 116 MG/DL — HIGH (ref 70–99)
GLUCOSE SERPL-MCNC: 116 MG/DL — HIGH (ref 70–99)
GLUCOSE UR QL: NEGATIVE — SIGNIFICANT CHANGE UP
HCO3 BLDV-SCNC: 22 MMOL/L — SIGNIFICANT CHANGE UP (ref 22–29)
HCT VFR BLD CALC: 38.8 % — SIGNIFICANT CHANGE UP (ref 34.5–45)
HCT VFR BLDA CALC: 39 % — SIGNIFICANT CHANGE UP (ref 34.5–46.5)
HGB BLD CALC-MCNC: 13 G/DL — SIGNIFICANT CHANGE UP (ref 11.7–16.1)
HGB BLD-MCNC: 12.7 G/DL — SIGNIFICANT CHANGE UP (ref 11.5–15.5)
HYALINE CASTS # UR AUTO: 13 /LPF — HIGH (ref 0–2)
IMM GRANULOCYTES NFR BLD AUTO: 0.4 % — SIGNIFICANT CHANGE UP (ref 0–0.9)
KETONES UR-MCNC: NEGATIVE — SIGNIFICANT CHANGE UP
LACTATE BLDV-MCNC: 1.2 MMOL/L — SIGNIFICANT CHANGE UP (ref 0.5–2)
LEUKOCYTE ESTERASE UR-ACNC: ABNORMAL
LIDOCAIN IGE QN: 25 U/L — SIGNIFICANT CHANGE UP (ref 7–60)
LYMPHOCYTES # BLD AUTO: 1.29 K/UL — SIGNIFICANT CHANGE UP (ref 1–3.3)
LYMPHOCYTES # BLD AUTO: 10.3 % — LOW (ref 13–44)
MCHC RBC-ENTMCNC: 28.9 PG — SIGNIFICANT CHANGE UP (ref 27–34)
MCHC RBC-ENTMCNC: 32.7 GM/DL — SIGNIFICANT CHANGE UP (ref 32–36)
MCV RBC AUTO: 88.4 FL — SIGNIFICANT CHANGE UP (ref 80–100)
MONOCYTES # BLD AUTO: 1.05 K/UL — HIGH (ref 0–0.9)
MONOCYTES NFR BLD AUTO: 8.4 % — SIGNIFICANT CHANGE UP (ref 2–14)
NEUTROPHILS # BLD AUTO: 10.05 K/UL — HIGH (ref 1.8–7.4)
NEUTROPHILS NFR BLD AUTO: 80.5 % — HIGH (ref 43–77)
NITRITE UR-MCNC: NEGATIVE — SIGNIFICANT CHANGE UP
NRBC # BLD: 0 /100 WBCS — SIGNIFICANT CHANGE UP (ref 0–0)
NT-PROBNP SERPL-SCNC: 101 PG/ML — SIGNIFICANT CHANGE UP (ref 0–300)
PCO2 BLDV: 33 MMHG — LOW (ref 39–42)
PH BLDV: 7.43 — SIGNIFICANT CHANGE UP (ref 7.32–7.43)
PH UR: 5.5 — SIGNIFICANT CHANGE UP (ref 5–8)
PLATELET # BLD AUTO: 197 K/UL — SIGNIFICANT CHANGE UP (ref 150–400)
PO2 BLDV: 53 MMHG — HIGH (ref 25–45)
POTASSIUM BLDV-SCNC: 3.9 MMOL/L — SIGNIFICANT CHANGE UP (ref 3.5–5.1)
POTASSIUM SERPL-MCNC: 3.9 MMOL/L — SIGNIFICANT CHANGE UP (ref 3.5–5.3)
POTASSIUM SERPL-SCNC: 3.9 MMOL/L — SIGNIFICANT CHANGE UP (ref 3.5–5.3)
PROT SERPL-MCNC: 7.7 G/DL — SIGNIFICANT CHANGE UP (ref 6–8.3)
PROT UR-MCNC: ABNORMAL
RBC # BLD: 4.39 M/UL — SIGNIFICANT CHANGE UP (ref 3.8–5.2)
RBC # FLD: 13.2 % — SIGNIFICANT CHANGE UP (ref 10.3–14.5)
RBC CASTS # UR COMP ASSIST: 3 /HPF — SIGNIFICANT CHANGE UP (ref 0–4)
SAO2 % BLDV: 86.2 % — SIGNIFICANT CHANGE UP (ref 67–88)
SODIUM SERPL-SCNC: 134 MMOL/L — LOW (ref 135–145)
SP GR SPEC: 1.03 — HIGH (ref 1.01–1.02)
TROPONIN T, HIGH SENSITIVITY RESULT: 7 NG/L — SIGNIFICANT CHANGE UP (ref 0–51)
UROBILINOGEN FLD QL: NEGATIVE — SIGNIFICANT CHANGE UP
WBC # BLD: 12.49 K/UL — HIGH (ref 3.8–10.5)
WBC # FLD AUTO: 12.49 K/UL — HIGH (ref 3.8–10.5)
WBC UR QL: 11 /HPF — HIGH (ref 0–5)

## 2023-10-03 PROCEDURE — 74177 CT ABD & PELVIS W/CONTRAST: CPT | Mod: 26,MA

## 2023-10-03 PROCEDURE — 71045 X-RAY EXAM CHEST 1 VIEW: CPT | Mod: 26

## 2023-10-03 RX ORDER — IBUPROFEN 200 MG
400 TABLET ORAL ONCE
Refills: 0 | Status: DISCONTINUED | OUTPATIENT
Start: 2023-10-03 | End: 2023-10-03

## 2023-10-03 RX ORDER — KETOROLAC TROMETHAMINE 30 MG/ML
15 SYRINGE (ML) INJECTION ONCE
Refills: 0 | Status: DISCONTINUED | OUTPATIENT
Start: 2023-10-03 | End: 2023-10-03

## 2023-10-03 RX ORDER — ACETAMINOPHEN 500 MG
1000 TABLET ORAL ONCE
Refills: 0 | Status: COMPLETED | OUTPATIENT
Start: 2023-10-03 | End: 2023-10-03

## 2023-10-03 RX ORDER — SODIUM CHLORIDE 9 MG/ML
1000 INJECTION INTRAMUSCULAR; INTRAVENOUS; SUBCUTANEOUS ONCE
Refills: 0 | Status: COMPLETED | OUTPATIENT
Start: 2023-10-03 | End: 2023-10-03

## 2023-10-03 RX ORDER — CEFTRIAXONE 500 MG/1
1000 INJECTION, POWDER, FOR SOLUTION INTRAMUSCULAR; INTRAVENOUS ONCE
Refills: 0 | Status: DISCONTINUED | OUTPATIENT
Start: 2023-10-03 | End: 2023-10-03

## 2023-10-03 RX ORDER — CEPHALEXIN 500 MG
1 CAPSULE ORAL
Qty: 14 | Refills: 0
Start: 2023-10-03 | End: 2023-10-09

## 2023-10-03 RX ORDER — CEFTRIAXONE 500 MG/1
1000 INJECTION, POWDER, FOR SOLUTION INTRAMUSCULAR; INTRAVENOUS ONCE
Refills: 0 | Status: COMPLETED | OUTPATIENT
Start: 2023-10-03 | End: 2023-10-03

## 2023-10-03 RX ADMIN — SODIUM CHLORIDE 1000 MILLILITER(S): 9 INJECTION INTRAMUSCULAR; INTRAVENOUS; SUBCUTANEOUS at 01:16

## 2023-10-03 RX ADMIN — Medication 15 MILLIGRAM(S): at 07:34

## 2023-10-03 RX ADMIN — CEFTRIAXONE 100 MILLIGRAM(S): 500 INJECTION, POWDER, FOR SOLUTION INTRAMUSCULAR; INTRAVENOUS at 04:01

## 2023-10-03 RX ADMIN — Medication 400 MILLIGRAM(S): at 01:17

## 2023-10-03 NOTE — ED PROVIDER NOTE - OBJECTIVE STATEMENT
73-year-old female patient past medical history of CAD status post stents who presents to the emergency department for abdominal pain, nausea and vomiting, fevers since today.  Patient denies any diarrhea, urine symptoms, palpitations.  However, does endorse nonexertional chest pain.

## 2023-10-03 NOTE — ED PROVIDER NOTE - NSFOLLOWUPINSTRUCTIONS_ED_ALL_ED_FT
You were seen for abdominal pain and was found with a urine infection. Please find your results attached to this document.     Keflex was sent to your pharmacy for your urine infection.     Please follow up with your primary care doctor for further management.     May take tylenol and/or motrin for fevers and pain.     Please return to the ED if you have worsening abdominal pain, vomiting, dehydration, or any other concerns.

## 2023-10-03 NOTE — ED ADULT NURSE NOTE - NSICDXPASTMEDICALHX_GEN_ALL_CORE_FT
impairments found/functional limitations in following categories
PAST MEDICAL HISTORY:  CAD (coronary artery disease)     Cyst of right ovary     H/O constipation     HLD (hyperlipidemia)     HTN (hypertension)     Right ovarian cyst     Stented coronary artery

## 2023-10-03 NOTE — ED PROVIDER NOTE - PATIENT PORTAL LINK FT
You can access the FollowMyHealth Patient Portal offered by [facility] by registering at the following website: [email or web address]. By joining [facility]’s FollowMyHealth portal, you will also be able to view your health information using other applications (apps) compatible with our system.

## 2023-10-03 NOTE — ED PROVIDER NOTE - PHYSICAL EXAMINATION
GENERAL: Awake, alert, NAD  HEENT: NC/AT, moist mucous membranes, EOMI  LUNGS: CTAB, no wheezes or crackles   CARDIAC: RRR, no m/r/g  ABDOMEN: Soft, epigastric tenderness to palpation, nondistended and no rebound  BACK: No midline spinal tenderness, no CVA tenderness  EXT: No edema, no calf tenderness, no deformities.  NEURO: A&Ox3. Moving all extremities.  SKIN: Warm and dry. No rash.  PSYCH: Normal affect.

## 2023-10-03 NOTE — ED PROVIDER NOTE - CLINICAL SUMMARY MEDICAL DECISION MAKING FREE TEXT BOX
73-year-old female patient past medical history of CAD status post stents who presents to the emergency department for fevers, abdominal pain, nausea, vomiting and intermittent chest pain.  On exam, found with epigastric abdominal tenderness to palpation.  Will eval for gastritis versus colitis versus diverticulitis versus renal etiology such as a UTI or stone.  We will also eval for cardiac etiology.  We will draw labs, CT, EKG, chest x-ray. 73-year-old female patient past medical history of CAD status post stents who presents to the emergency department for fevers, abdominal pain, nausea, vomiting and intermittent chest pain.  On exam, found with epigastric abdominal tenderness to palpation.  Will eval for gastritis versus colitis versus diverticulitis versus renal etiology such as a UTI or stone.  We will also eval for cardiac etiology.  We will draw labs, CT, EKG, chest x-ray.    Lonnie: 73 year old female with cad s/p stents here for fevers, abdominal pain, n/v, intermittent cp. found to epigastric abdominal ttp.  will get labs, ua, r/o stone versus colitis versus gastritis diverticulits, will evaluate for cards etiology, ct, imaging, ekg, reassess

## 2023-10-03 NOTE — ED PROVIDER NOTE - NSCAREINITIATED _GEN_ER
[Normal Growth] : growth [Normal Development] : development  [No Elimination Concerns] : elimination [Continue Regimen] : feeding [No Skin Concerns] : skin [Normal Sleep Pattern] : sleep [None] : no medical problems [Anticipatory Guidance Given] : Anticipatory guidance addressed as per the history of present illness section [Nutritional Adequacy and Growth] : nutritional adequacy and growth [Infant Development] : infant development [Safety] : safety [Age Approp Vaccines] : Age appropriate vaccines administered [No Medications] : ~He/She~ is not on any medications [Mother] : mother [] : The components of the vaccine(s) to be administered today are listed in the plan of care. The disease(s) for which the vaccine(s) are intended to prevent and the risks have been discussed with the caretaker.  The risks are also included in the appropriate vaccination information statements which have been provided to the patient's caregiver.  The caregiver has given consent to vaccinate. Natasha Blue(Resident)

## 2023-10-03 NOTE — ED ADULT NURSE NOTE - OBJECTIVE STATEMENT
73y F A&Ox4 c/o cp and abdominal pain. Pt states that yesterday she had the sudden onset of chest pain and lower right abdominal pain. Pt also states that she has been having intermittent fevers, Ha, and one episode of N/V/ today. Upon assessment pt endorsing intermittent midsternal chest pain that increases with movement or position changes. Pt lungs clear B/L however pt febrile (102.6). Denies any cough/dizziness/blurry vision/D/Gi/Gu symptoms. PMH of HTN, HLD and CAD. PSH of 2x cardiac stent placement, ovary and fallopian tube removal.

## 2023-10-04 ENCOUNTER — INPATIENT (INPATIENT)
Facility: HOSPITAL | Age: 73
LOS: 2 days | Discharge: HOME | DRG: 872 | End: 2023-10-07
Attending: INTERNAL MEDICINE | Admitting: INTERNAL MEDICINE
Payer: MEDICARE

## 2023-10-04 VITALS
HEIGHT: 63 IN | OXYGEN SATURATION: 95 % | TEMPERATURE: 101 F | RESPIRATION RATE: 18 BRPM | DIASTOLIC BLOOD PRESSURE: 78 MMHG | SYSTOLIC BLOOD PRESSURE: 133 MMHG | WEIGHT: 138.01 LBS | HEART RATE: 99 BPM

## 2023-10-04 DIAGNOSIS — Z98.890 OTHER SPECIFIED POSTPROCEDURAL STATES: Chronic | ICD-10-CM

## 2023-10-04 DIAGNOSIS — R07.0 PAIN IN THROAT: ICD-10-CM

## 2023-10-04 DIAGNOSIS — R09.89 OTHER SPECIFIED SYMPTOMS AND SIGNS INVOLVING THE CIRCULATORY AND RESPIRATORY SYSTEMS: ICD-10-CM

## 2023-10-04 DIAGNOSIS — R60.0 LOCALIZED EDEMA: ICD-10-CM

## 2023-10-04 LAB
APPEARANCE UR: CLEAR — SIGNIFICANT CHANGE UP
APTT BLD: 38.4 SEC — HIGH (ref 24.5–35.6)
BACTERIA # UR AUTO: NEGATIVE — SIGNIFICANT CHANGE UP
BASE EXCESS BLDV CALC-SCNC: -0.7 MMOL/L — SIGNIFICANT CHANGE UP (ref -2–3)
BASOPHILS # BLD AUTO: 0.03 K/UL — SIGNIFICANT CHANGE UP (ref 0–0.2)
BASOPHILS NFR BLD AUTO: 0.4 % — SIGNIFICANT CHANGE UP (ref 0–2)
BILIRUB UR-MCNC: NEGATIVE — SIGNIFICANT CHANGE UP
CA-I SERPL-SCNC: 1.2 MMOL/L — SIGNIFICANT CHANGE UP (ref 1.15–1.33)
CHLORIDE BLDV-SCNC: 104 MMOL/L — SIGNIFICANT CHANGE UP (ref 96–108)
CO2 BLDV-SCNC: 26 MMOL/L — SIGNIFICANT CHANGE UP (ref 22–26)
COLOR SPEC: YELLOW — SIGNIFICANT CHANGE UP
CULTURE RESULTS: SIGNIFICANT CHANGE UP
DIFF PNL FLD: ABNORMAL
EOSINOPHIL # BLD AUTO: 0.01 K/UL — SIGNIFICANT CHANGE UP (ref 0–0.5)
EOSINOPHIL NFR BLD AUTO: 0.1 % — SIGNIFICANT CHANGE UP (ref 0–6)
EPI CELLS # UR: 1 /HPF — SIGNIFICANT CHANGE UP
GAS PNL BLDV: 136 MMOL/L — SIGNIFICANT CHANGE UP (ref 136–145)
GAS PNL BLDV: SIGNIFICANT CHANGE UP
GAS PNL BLDV: SIGNIFICANT CHANGE UP
GLUCOSE BLDV-MCNC: 88 MG/DL — SIGNIFICANT CHANGE UP (ref 70–99)
GLUCOSE UR QL: NEGATIVE — SIGNIFICANT CHANGE UP
HADV DNA SPEC QL NAA+PROBE: DETECTED
HCO3 BLDV-SCNC: 25 MMOL/L — SIGNIFICANT CHANGE UP (ref 22–29)
HCT VFR BLD CALC: 35.2 % — SIGNIFICANT CHANGE UP (ref 34.5–45)
HCT VFR BLDA CALC: 36 % — SIGNIFICANT CHANGE UP (ref 34.5–46.5)
HGB BLD CALC-MCNC: 12.1 G/DL — SIGNIFICANT CHANGE UP (ref 11.7–16.1)
HGB BLD-MCNC: 11.9 G/DL — SIGNIFICANT CHANGE UP (ref 11.5–15.5)
HYALINE CASTS # UR AUTO: 3 /LPF — HIGH (ref 0–2)
IMM GRANULOCYTES NFR BLD AUTO: 0.4 % — SIGNIFICANT CHANGE UP (ref 0–0.9)
INR BLD: 1.25 RATIO — HIGH (ref 0.85–1.18)
KETONES UR-MCNC: SIGNIFICANT CHANGE UP
LACTATE BLDV-MCNC: 1.9 MMOL/L — SIGNIFICANT CHANGE UP (ref 0.5–2)
LEUKOCYTE ESTERASE UR-ACNC: NEGATIVE — SIGNIFICANT CHANGE UP
LYMPHOCYTES # BLD AUTO: 1.53 K/UL — SIGNIFICANT CHANGE UP (ref 1–3.3)
LYMPHOCYTES # BLD AUTO: 19.8 % — SIGNIFICANT CHANGE UP (ref 13–44)
MAGNESIUM SERPL-MCNC: 2.4 MG/DL — SIGNIFICANT CHANGE UP (ref 1.6–2.6)
MCHC RBC-ENTMCNC: 30.1 PG — SIGNIFICANT CHANGE UP (ref 27–34)
MCHC RBC-ENTMCNC: 33.8 GM/DL — SIGNIFICANT CHANGE UP (ref 32–36)
MCV RBC AUTO: 89.1 FL — SIGNIFICANT CHANGE UP (ref 80–100)
MONOCYTES # BLD AUTO: 0.64 K/UL — SIGNIFICANT CHANGE UP (ref 0–0.9)
MONOCYTES NFR BLD AUTO: 8.3 % — SIGNIFICANT CHANGE UP (ref 2–14)
NEUTROPHILS # BLD AUTO: 5.49 K/UL — SIGNIFICANT CHANGE UP (ref 1.8–7.4)
NEUTROPHILS NFR BLD AUTO: 71 % — SIGNIFICANT CHANGE UP (ref 43–77)
NITRITE UR-MCNC: NEGATIVE — SIGNIFICANT CHANGE UP
NRBC # BLD: 0 /100 WBCS — SIGNIFICANT CHANGE UP (ref 0–0)
PCO2 BLDV: 44 MMHG — HIGH (ref 39–42)
PH BLDV: 7.36 — SIGNIFICANT CHANGE UP (ref 7.32–7.43)
PH UR: 5.5 — SIGNIFICANT CHANGE UP (ref 5–8)
PHOSPHATE SERPL-MCNC: 2.8 MG/DL — SIGNIFICANT CHANGE UP (ref 2.5–4.5)
PLATELET # BLD AUTO: 180 K/UL — SIGNIFICANT CHANGE UP (ref 150–400)
PO2 BLDV: 32 MMHG — SIGNIFICANT CHANGE UP (ref 25–45)
POTASSIUM BLDV-SCNC: 5.2 MMOL/L — HIGH (ref 3.5–5.1)
PROCALCITONIN SERPL-MCNC: 0.08 NG/ML — SIGNIFICANT CHANGE UP (ref 0.02–0.1)
PROT UR-MCNC: ABNORMAL
PROTHROM AB SERPL-ACNC: 13 SEC — SIGNIFICANT CHANGE UP (ref 9.5–13)
RAPID RVP RESULT: DETECTED
RBC # BLD: 3.95 M/UL — SIGNIFICANT CHANGE UP (ref 3.8–5.2)
RBC # FLD: 13.5 % — SIGNIFICANT CHANGE UP (ref 10.3–14.5)
RBC CASTS # UR COMP ASSIST: 6 /HPF — HIGH (ref 0–4)
SAO2 % BLDV: 49.6 % — LOW (ref 67–88)
SARS-COV-2 RNA SPEC QL NAA+PROBE: SIGNIFICANT CHANGE UP
SP GR SPEC: 1.02 — SIGNIFICANT CHANGE UP (ref 1.01–1.02)
SPECIMEN SOURCE: SIGNIFICANT CHANGE UP
UROBILINOGEN FLD QL: NEGATIVE — SIGNIFICANT CHANGE UP
WBC # BLD: 7.73 K/UL — SIGNIFICANT CHANGE UP (ref 3.8–10.5)
WBC # FLD AUTO: 7.73 K/UL — SIGNIFICANT CHANGE UP (ref 3.8–10.5)
WBC UR QL: 1 /HPF — SIGNIFICANT CHANGE UP (ref 0–5)

## 2023-10-04 PROCEDURE — 70491 CT SOFT TISSUE NECK W/DYE: CPT | Mod: 26,MA

## 2023-10-04 PROCEDURE — 99285 EMERGENCY DEPT VISIT HI MDM: CPT | Mod: FS,GC

## 2023-10-04 PROCEDURE — 71260 CT THORAX DX C+: CPT | Mod: 26

## 2023-10-04 RX ORDER — AMPICILLIN SODIUM AND SULBACTAM SODIUM 250; 125 MG/ML; MG/ML
3 INJECTION, POWDER, FOR SUSPENSION INTRAMUSCULAR; INTRAVENOUS ONCE
Refills: 0 | Status: COMPLETED | OUTPATIENT
Start: 2023-10-04 | End: 2023-10-04

## 2023-10-04 RX ORDER — ACETAMINOPHEN 500 MG
1000 TABLET ORAL ONCE
Refills: 0 | Status: COMPLETED | OUTPATIENT
Start: 2023-10-04 | End: 2023-10-04

## 2023-10-04 RX ADMIN — Medication 400 MILLIGRAM(S): at 15:18

## 2023-10-04 RX ADMIN — AMPICILLIN SODIUM AND SULBACTAM SODIUM 200 GRAM(S): 250; 125 INJECTION, POWDER, FOR SUSPENSION INTRAMUSCULAR; INTRAVENOUS at 15:16

## 2023-10-04 NOTE — CONSULT NOTE ADULT - ASSESSMENT
74yo female presents with R sided throat pain and difficulty swallowing since yesterday. Pt was seen in the ER on 10/2 for chest pain/fever and abd pain ,found to have UTI and started on Keflex. Yesterday she developed worsening throat pain. Pt was seen at urgent care this morning with Neg covid/flu and strep. ENT called to evaluate. Pt c/o hoarseness and worsening throat pain with difficulty tolerating solids and liquids. She c/o associated fever and progressively worsening neck pain R>L and HA. She denies difficulty breathing, stridor. On exam, + uvular edema and pharyngeal erythema. Indirect laryngoscopy shows + exudates along BOT.  74yo female with PMHx HTN HLD CAD s/p 2 stents on asa and plavix presents to ED with R sided throat pain and difficulty swallowing since yesterday. Pt was seen in the ER on 10/2 for chest pain/fever and abd pain, found to have UTI and started on Keflex. Yesterday she developed worsening throat pain and swelling. Pt was seen at urgent care this morning with neg covid, flu and strep. ENT called to evaluate. Pt also c/o hoarseness and odynophagia. She c/o associated fever up to 103F, neck pain R>L and HA. On exam, mild uvular edema, pharyngeal erythema, tonsillar exudates R>L, b/l neck swelling R>L. Indirect laryngoscopy shows exudates along base of tongue, widely patent airway. Viral vs bacterial pharyngitis. Neck swelling likely submandibular sialadenitis.  74yo female with PMHx HTN HLD CAD s/p 2 stents on asa and plavix presents to ED with R sided throat pain and difficulty swallowing since yesterday. Pt was seen in the ER on 10/2 for chest pain/fever and abd pain, found to have UTI and started on Keflex. Yesterday she developed worsening throat pain and swelling. Pt was seen at urgent care this morning with neg covid, flu and strep. ENT called to evaluate. Pt also c/o hoarseness and odynophagia. She c/o associated fever up to 103F, neck pain R>L and HA. On exam, mild uvular edema, pharyngeal erythema, tonsillar exudates R>L, b/l neck swelling R>L. CT neck shows Moderate to severe enlargement of the palatine tonsils. Heterogeneous appearance without evidence of a tonsillar or peritonsillar abscess. 2.6 cm rounded structure by the azygos arch. 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 Indirect laryngoscopy shows exudates along base of tongue, widely patent airway. Likely bacterial pharyngitis.

## 2023-10-04 NOTE — CONSULT NOTE ADULT - NS ATTEND AMEND GEN_ALL_CORE FT
ENT consulted for throat pain    Patient is 72yo female with PMHx HTN HLD CAD s/p 2 stents on asa and plavix presents to ED with R sided throat pain and difficulty swallowing since yesterday.     Pt was seen in the ER on 10/2 for chest pain/fever and abd pain, found to have UTI and started on Keflex. Yesterday she developed worsening throat pain and swelling. Pt was seen at urgent care this morning with neg covid, flu and strep.     Pt also c/o hoarseness and odynophagia. She c/o associated fever up to 103F, neck pain R>L and HA.     Physical exam shows mild uvular edema, pharyngeal erythema, bilateral tonsillar exudates R>L, b/l neck swelling R>L.     10/4/23 As per radiology CT neck shows Moderate to severe enlargement of the palatine tonsils. Heterogeneous appearance without evidence of a tonsillar or peritonsillar abscess. 2.6 cm rounded structure by the azygos arch. 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 Adjacent node or mass could not be excluded. Follow-up chest CT recommended.    Indirect laryngoscopy shows exudates along base of tongue, widely patent airway. Vocal fold symmetrical mobile    Recommend:  Acute Tonsillitis  - f/u uvula and tonsil culture  - if pt is admitted, rec Unasyn; if discharge, d/c with Augmentin 875mg BID x 10days  - Gargle with magic mouth wash for throat pain  - For incidental findings of aneurysmal dilatation of the azygos arch, rec either CT surgery or vascular for further evaluation   - Please call to have appointment with Dr Laird/Mauro/Tarun/Paulo @ [phone]. Add: [address], [place] [address]

## 2023-10-04 NOTE — ED PROVIDER NOTE - NS ED ROS FT
Constitutional: +fever +chills  Eyes: No visual changes, eye pain or redness  HEENT: +throat pain, -ear pain, nasal pain. No nose bleeding.  CV: No chest pain or lower extremity edema  Resp: No SOB no cough  GI: No abd pain. No nausea or vomiting. No diarrhea. No constipation.   : No dysuria, hematuria.   MSK: +musculoskeletal pain  Skin: No rash  Neuro: No headache. No numbness or tingling. No weakness.

## 2023-10-04 NOTE — CONSULT NOTE ADULT - PROBLEM SELECTOR RECOMMENDATION 9
Pending uvula and tonsil culture  Recommend abx  ENT will continue to follow  Call with questions or concerns x [phone] - f/up uvula and tonsil culture  - if pt is admitted, rec Unasyn; if discharge , d/c with Augmentin 875mg BID x 10days  - For accidental findings of questionable aneurysmal dilatation of the azygos arch. rec either CT surgery or vascular for further evaluation   - for bilateral tonsilitis, please followup with ENT outpatient   - Please call to have appointment with Dr Laird/Mauro/Tarun/Paulo @ [phone]. Add: [address], [place] [address] - f/u uvula and tonsil culture  - if pt is admitted, rec Unasyn; if discharge, d/c with Augmentin 875mg BID x 10days  - For incidental findings of questionable aneurysmal dilatation of the azygos arch, rec either CT surgery or vascular for further evaluation   - for bilateral tonsilitis, please f/u ENT outpatient   - Please call to have appointment with Dr Laird/Mauro/Tarun/Paulo @ [phone]. Add: [address], [place] [address] Acute Tonsillitis  - f/u uvula and tonsil culture  - if pt is admitted, rec Unasyn; if discharge, d/c with Augmentin 875mg BID x 10days  - Gargle with magic mouth wash for throat pain  - For incidental findings of aneurysmal dilatation of the azygos arch, rec either CT surgery or vascular for further evaluation   - Please call to have appointment with Dr Laird/Mauro/Tarun/Paulo @ [phone]. Add: [address], [place] [address]

## 2023-10-04 NOTE — ED PROVIDER NOTE - PHYSICAL EXAMINATION
Gen: AAO x 3, slightly muffled voice.   Skin: No rashes or lesions  HEENT: NC/AT, PERRLA, EOMI, MMM; +R submandibular swelling with tender LAD +erythema to the pharynx, limited exam   Resp: unlabored CTAB  Cardiac: rrr s1s2, no murmurs, rubs or gallops  GI: ND, +BS, Soft, NT  Ext: no pedal edema, FROM in all extremities  Neuro: no focal deficits

## 2023-10-04 NOTE — ED PROVIDER NOTE - HIV OFFER
Chief complaint:   Chief Complaint   Patient presents with   • Stomach       Vitals:  Visit Vitals  /83   Pulse 76   Temp 98.9 °F (37.2 °C) (Oral)   Resp 16   Ht 6' 3\" (1.905 m)   Wt 73 kg   SpO2 98%   BMI 20.12 kg/m²       HISTORY OF PRESENT ILLNESS     Patient presents for evaluation of stomach cramping this been going on for about a week now.  Patient states he had similar symptoms 2-3 months ago that was given dicyclomine and another medicine.  Patient has had no throwing up or diarrhea.  He has had no fevers.  The cramping comes and goes.  He has had a loss of appetite secondary to the above symptoms      Other significant problems:  Patient Active Problem List    Diagnosis Date Noted   • Blepharitis of upper and lower eyelids of both eyes 07/25/2019     Priority: Low       PAST MEDICAL, FAMILY AND SOCIAL HISTORY     Medications:  No current outpatient medications on file.     No current facility-administered medications for this visit.        Allergies:  ALLERGIES:  No Known Allergies    Past Medical  History/Surgeries:  History reviewed. No pertinent past medical history.    History reviewed. No pertinent surgical history.    Family History:  Family History   Problem Relation Age of Onset   • Cancer Maternal Grandmother    • Cancer Maternal Grandfather    • Cancer Paternal Grandfather    • Cancer Maternal Great-Grandfather    • Diabetes Maternal Great-Grandfather        Social History:  Social History     Tobacco Use   • Smoking status: Current Every Day Smoker     Packs/day: 0.25     Types: Cigarettes   • Smokeless tobacco: Never Used   Substance Use Topics   • Alcohol use: Yes     Alcohol/week: 2.0 standard drinks     Types: 2 Cans of beer per week       REVIEW OF SYSTEMS     Review of Systems   Constitutional: Positive for appetite change. Negative for fever.   HENT: Negative for congestion.    Eyes: Negative for discharge.   Respiratory: Negative for cough.    Cardiovascular: Negative for chest  pain.   Gastrointestinal: Negative for anal bleeding, blood in stool, diarrhea and vomiting.        Abdominal cramping   Musculoskeletal: Negative for myalgias.   Skin: Negative for rash.   Allergic/Immunologic: Negative for environmental allergies and food allergies.   Neurological: Negative for dizziness.       PHYSICAL EXAM     Physical Exam  Vitals signs and nursing note reviewed.   Constitutional:       Appearance: Normal appearance.   HENT:      Head: Normocephalic and atraumatic.      Right Ear: External ear normal.      Left Ear: External ear normal.      Nose: Nose normal.      Mouth/Throat:      Mouth: Mucous membranes are moist.      Pharynx: Oropharynx is clear.   Eyes:      Conjunctiva/sclera: Conjunctivae normal.   Neck:      Musculoskeletal: Normal range of motion and neck supple.   Cardiovascular:      Rate and Rhythm: Normal rate and regular rhythm.   Pulmonary:      Effort: Pulmonary effort is normal.      Breath sounds: Normal breath sounds.   Abdominal:      Palpations: Abdomen is soft.      Tenderness: There is no abdominal tenderness. There is no guarding or rebound.   Musculoskeletal:      Right lower leg: No edema.      Left lower leg: No edema.   Skin:     General: Skin is warm and dry.   Neurological:      Mental Status: He is alert and oriented to person, place, and time.   Psychiatric:         Mood and Affect: Mood normal.         Behavior: Behavior normal.         ASSESSMENT/PLAN     Assessment:  Gastritis    Plan:  Patient was prescribed dicyclomine and Carafate.  Patient is advised to Avoid spicy foods.  Take Bentyl for cramping.  Take Carafate as directed.  Recheck in a week if not improved    Patient was seen in conjunction with Dr. Newsome   Previously Declined (within the last year)

## 2023-10-04 NOTE — CONSULT NOTE ADULT - SUBJECTIVE AND OBJECTIVE BOX
CC: throat pain    HPI: 72yo female with PMHx HTN HLD CAD s/p 2 stents on asa and plavix here with R sided throat pain and difficulty swallowing since yesterday. Pt was seen in the ER on 10/2 for chest pain/fever and abd pain ,found to have UTI and started on Keflex. Yesterday she developed worsening throat pain. Pt was seen at urgent care this morning with Neg covid/flu and strep. ENT called to evaluate. Pt c/o hoarseness and worsening throat pain with difficulty tolerating solids and liquids. She c/o associated fever and progressively worsening neck pain R>L and HA. She denies difficulty breathing, stridor, n/v, SOB, hemoptysis, unintentional weight loss.       PAST MEDICAL & SURGICAL HISTORY:  Cyst of right ovary      HTN (hypertension)      HLD (hyperlipidemia)      CAD (coronary artery disease)      Stented coronary artery      Right ovarian cyst      H/O constipation      H/O benign breast biopsy      H/O colonoscopy      History of D&C        Allergies    shellfish (Hives)  sulfADIAZINE (Hives)  Gadavist (Other)  fluoroquinolone antibiotics (Rash)    Intolerances      MEDICATIONS  (STANDING):    MEDICATIONS  (PRN):      Social History: Pt denies tobacco use   Family history: Pt denies any significant family history     ROS:   ENT: all negative except as noted in HPI   CV: denies palpitations  Pulm: denies SOB, cough, hemoptysis  GI: denies change in apetite, indigestion, n/v  : denies pertinent urinary symptoms, urgency  Neuro: denies numbness/tingling, loss of sensation  Psych: denies anxiety  MS: denies muscle weakness, instability  Heme: denies easy bruising or bleeding  Endo: denies heat/cold intolerance, excessive sweating  Vascular: denies LE edema    Vital Signs Last 24 Hrs  T(C): 38.3 (04 Oct 2023 15:30), Max: 38.3 (04 Oct 2023 15:30)  T(F): 101 (04 Oct 2023 15:30), Max: 101 (04 Oct 2023 15:30)  HR: 91 (04 Oct 2023 15:30) (91 - 99)  BP: 128/74 (04 Oct 2023 15:30) (128/74 - 133/78)  BP(mean): --  RR: 16 (04 Oct 2023 15:30) (16 - 18)  SpO2: 97% (04 Oct 2023 15:30) (95% - 97%)    Parameters below as of 04 Oct 2023 15:30  Patient On (Oxygen Delivery Method): room air                              11.9   7.73  )-----------( 180      ( 04 Oct 2023 14:55 )             35.2    10-04    141  |  103  |  11  ----------------------------<  94  4.4   |  21<L>  |  0.78    Ca    9.5      04 Oct 2023 14:55  Phos  2.8     10-04  Mg     2.4     10-04    TPro  7.5  /  Alb  4.0  /  TBili  0.4  /  DBili  x   /  AST  29  /  ALT  31  /  AlkPhos  121<H>  10-04   PT/INR - ( 04 Oct 2023 14:55 )   PT: 13.0 sec;   INR: 1.25 ratio         PTT - ( 04 Oct 2023 14:55 )  PTT:38.4 sec    PHYSICAL EXAM:  Gen: NAD  Skin: No rashes, bruises, or lesions  Head: Normocephalic, Atraumatic  Face: no edema, erythema, or fluctuance. Parotid glands soft without mass  Eyes: no scleral injection  Nose: Nares bilaterally patent, no discharge  Mouth: + mild uvular edema. + pharyngeal erythema and tonsillar exudates R>L. No Stridor / Drooling / Trismus.  Mucosa moist, tongue/uvula midline, oropharynx clear  Neck: Flat, supple, no lymphadenopathy, trachea midline, no masses  Lymphatic: No lymphadenopathy  Resp: breathing easily, no stridor  CV: no peripheral edema/cyanosis  GI: nondistended   Peripheral vascular: no JVD or edema  Neuro: facial nerve intact, no facial droop      Fiberoptic Indirect Laryngoscopy (Ambu scope used):    Reason for Laryngoscopy: throat pain    Patient was unable to cooperate with mirror.  + exudates along BOT. Nasopharynx, oropharynx, and hypopharynx clear, no bleeding. Tongue base, posterior pharyngeal wall, vallecula, epiglottis, and subglottis appear normal. No erythema, edema, pooling of secretions, masses or lesions. Airway patent, no foreign body visualized. No glottic/supraglottic edema. True vocal cords, arytenoids, vestibular folds, ventricles, pyriform sinuses, and aryepiglottic folds appear normal bilaterally. Vocal cords mobile with good contact b/l.          IMAGING/ADDITIONAL STUDIES:   pending Neck CT CC: throat pain    HPI: 72yo female with PMHx HTN HLD CAD s/p 2 stents on asa and plavix presents to ED with R sided throat pain and difficulty swallowing since yesterday. Pt was seen in the ER on 10/2 for chest pain/fever and abd pain, found to have UTI and started on Keflex. Yesterday she developed worsening throat pain and swelling. Pt was seen at urgent care this morning with neg covid, flu and strep. ENT called to evaluate. Pt c/o hoarseness and odynophagia. She c/o associated fever up to 103F and neck pain R>L and HA. Denies N/V, dysphagia, dysphonia, SOB, dyspnea, or inability to tolerate secretions.       PAST MEDICAL & SURGICAL HISTORY:  Cyst of right ovary      HTN (hypertension)      HLD (hyperlipidemia)      CAD (coronary artery disease)      Stented coronary artery      Right ovarian cyst      H/O constipation      H/O benign breast biopsy      H/O colonoscopy      History of D&C        Allergies    shellfish (Hives)  sulfADIAZINE (Hives)  Gadavist (Other)  fluoroquinolone antibiotics (Rash)    Intolerances      MEDICATIONS  (STANDING):    MEDICATIONS  (PRN):      Social History: Pt denies tobacco use   Family history: Pt denies any significant family history     ROS:   ENT: all negative except as noted in HPI   CV: denies palpitations  Pulm: denies SOB, cough, hemoptysis  GI: denies change in appetite, indigestion, n/v  : denies pertinent urinary symptoms, urgency  Neuro: denies numbness/tingling, loss of sensation  Psych: denies anxiety  MS: denies muscle weakness, instability  Heme: denies easy bruising or bleeding  Endo: denies heat/cold intolerance, excessive sweating  Vascular: denies LE edema    Vital Signs Last 24 Hrs  T(C): 38.3 (04 Oct 2023 15:30), Max: 38.3 (04 Oct 2023 15:30)  T(F): 101 (04 Oct 2023 15:30), Max: 101 (04 Oct 2023 15:30)  HR: 91 (04 Oct 2023 15:30) (91 - 99)  BP: 128/74 (04 Oct 2023 15:30) (128/74 - 133/78)  BP(mean): --  RR: 16 (04 Oct 2023 15:30) (16 - 18)  SpO2: 97% (04 Oct 2023 15:30) (95% - 97%)    Parameters below as of 04 Oct 2023 15:30  Patient On (Oxygen Delivery Method): room air                              11.9   7.73  )-----------( 180      ( 04 Oct 2023 14:55 )             35.2    10-04    141  |  103  |  11  ----------------------------<  94  4.4   |  21<L>  |  0.78    Ca    9.5      04 Oct 2023 14:55  Phos  2.8     10-04  Mg     2.4     10-04    TPro  7.5  /  Alb  4.0  /  TBili  0.4  /  DBili  x   /  AST  29  /  ALT  31  /  AlkPhos  121<H>  10-04   PT/INR - ( 04 Oct 2023 14:55 )   PT: 13.0 sec;   INR: 1.25 ratio         PTT - ( 04 Oct 2023 14:55 )  PTT:38.4 sec    PHYSICAL EXAM:  Gen: NAD  Skin: No rashes, bruises, or lesions  Head: Normocephalic, Atraumatic  Face: no edema, erythema, or fluctuance. Parotid glands soft without mass  Eyes: no scleral injection  Nose: Nares bilaterally patent, no discharge  Mouth: + mild uvular edema. + pharyngeal erythema and tonsillar exudates R>L. No Stridor / Drooling / Trismus.  Mucosa moist, tongue/uvula midline, oropharynx clear  Neck +b/l neck swelling R>L. TTP. Flat, supple, no lymphadenopathy, trachea midline  Lymphatic: No lymphadenopathy  Resp: breathing easily, no stridor  CV: no peripheral edema/cyanosis  GI: nondistended   Peripheral vascular: no JVD or edema  Neuro: facial nerve intact, no facial droop      Fiberoptic Indirect Laryngoscopy (Ambu scope used):    Reason for Laryngoscopy: throat pain    Patient was unable to cooperate with mirror.  + exudates along BOT. Nasopharynx, oropharynx, and hypopharynx clear, no bleeding. posterior pharyngeal wall, vallecula, epiglottis, and subglottis appear normal. No erythema, edema, pooling of secretions, masses or lesions. Airway patent, no foreign body visualized. No glottic/supraglottic edema. True vocal cords, arytenoids, vestibular folds, ventricles, pyriform sinuses, and aryepiglottic folds appear normal bilaterally. Vocal cords mobile with good contact b/l.          IMAGING/ADDITIONAL STUDIES:   pending Neck CT CC: throat pain    HPI: 74yo female with PMHx HTN HLD CAD s/p 2 stents on asa and plavix presents to ED with R sided throat pain and difficulty swallowing since yesterday. Pt was seen in the ER on 10/2 for chest pain/fever and abd pain, found to have UTI and started on Keflex. Yesterday she developed worsening throat pain and swelling. Pt was seen at urgent care this morning with neg covid, flu and strep. ENT called to evaluate. Pt c/o hoarseness and odynophagia. She c/o associated fever up to 103F and neck pain R>L and HA. Denies N/V, dysphagia, dysphonia, SOB, dyspnea, or inability to tolerate secretions.       PAST MEDICAL & SURGICAL HISTORY:  Cyst of right ovary      HTN (hypertension)      HLD (hyperlipidemia)      CAD (coronary artery disease)      Stented coronary artery      Right ovarian cyst      H/O constipation      H/O benign breast biopsy      H/O colonoscopy      History of D&C        Allergies    shellfish (Hives)  sulfADIAZINE (Hives)  Gadavist (Other)  fluoroquinolone antibiotics (Rash)    Intolerances      MEDICATIONS  (STANDING):    MEDICATIONS  (PRN):      Social History: Pt denies tobacco use   Family history: Pt denies any significant family history     ROS:   ENT: all negative except as noted in HPI   CV: denies palpitations  Pulm: denies SOB, cough, hemoptysis  GI: denies change in appetite, indigestion, n/v  : denies pertinent urinary symptoms, urgency  Neuro: denies numbness/tingling, loss of sensation  Psych: denies anxiety  MS: denies muscle weakness, instability  Heme: denies easy bruising or bleeding  Endo: denies heat/cold intolerance, excessive sweating  Vascular: denies LE edema    Vital Signs Last 24 Hrs  T(C): 38.3 (04 Oct 2023 15:30), Max: 38.3 (04 Oct 2023 15:30)  T(F): 101 (04 Oct 2023 15:30), Max: 101 (04 Oct 2023 15:30)  HR: 91 (04 Oct 2023 15:30) (91 - 99)  BP: 128/74 (04 Oct 2023 15:30) (128/74 - 133/78)  BP(mean): --  RR: 16 (04 Oct 2023 15:30) (16 - 18)  SpO2: 97% (04 Oct 2023 15:30) (95% - 97%)    Parameters below as of 04 Oct 2023 15:30  Patient On (Oxygen Delivery Method): room air                              11.9   7.73  )-----------( 180      ( 04 Oct 2023 14:55 )             35.2    10-04    141  |  103  |  11  ----------------------------<  94  4.4   |  21<L>  |  0.78    Ca    9.5      04 Oct 2023 14:55  Phos  2.8     10-04  Mg     2.4     10-04    TPro  7.5  /  Alb  4.0  /  TBili  0.4  /  DBili  x   /  AST  29  /  ALT  31  /  AlkPhos  121<H>  10-04   PT/INR - ( 04 Oct 2023 14:55 )   PT: 13.0 sec;   INR: 1.25 ratio         PTT - ( 04 Oct 2023 14:55 )  PTT:38.4 sec    PHYSICAL EXAM:  Gen: NAD  Skin: No rashes, bruises, or lesions  Head: Normocephalic, Atraumatic  Face: no edema, erythema, or fluctuance. Parotid glands soft without mass  Eyes: no scleral injection  Nose: Nares bilaterally patent, no discharge  Mouth: + mild uvular edema. + pharyngeal erythema and b/l tonsillar exudates R>L. No Stridor / Drooling / Trismus.  Mucosa moist, tongue/uvula midline, oropharynx clear  Neck +b/l neck swelling R>L. TTP. Flat, supple, no lymphadenopathy, trachea midline  Lymphatic: No lymphadenopathy  Resp: breathing easily, no stridor  CV: no peripheral edema/cyanosis  GI: nondistended   Peripheral vascular: no JVD or edema  Neuro: facial nerve intact, no facial droop      Fiberoptic Indirect Laryngoscopy (Ambu scope used):    Reason for Laryngoscopy: throat pain    Patient was unable to cooperate with mirror.  + exudates along BOT. Nasopharynx, oropharynx, and hypopharynx clear, no bleeding. posterior pharyngeal wall, vallecula, epiglottis, and subglottis appear normal. No erythema, edema, pooling of secretions, masses or lesions. Airway patent, no foreign body visualized. No glottic/supraglottic edema. True vocal cords, arytenoids, vestibular folds, ventricles, pyriform sinuses, and aryepiglottic folds appear normal bilaterally. Vocal cords mobile with good contact b/l.      IMAGING/ADDITIONAL STUDIES:   < from: CT Neck Soft Tissue w/ IV Cont (10.04.23 @ 18:37) >    ACC: [id number] EXAM:  CT NECK SOFT TISSUE IC   ORDERED BY: AUREA LING     PROCEDURE DATE:  10/04/2023          INTERPRETATION:  CT NECK SOFT TISSUE WITH IV CONTRAST    Clinical information: r/o PTA    TECHNIQUE:  A contrast enhanced CT of the neck was performed from the base of the   skull to the thoracic inlet.  Sagittal and coronal reformations were   submitted.  IV Contrast:  Omnipaque 300.  70 cc administered, 30 cc discarded.    COMPARISON:  No prior studies for comparison    FINDINGS:  Exam is limited by artifact from dental amalgam  PARANASAL SINUSES: The visualized paranasal sinuses are well aerated.  MASTOIDS: There is normal aeration of the mastoids and middle ear.  NASOPHARYNX: There is no nasopharyngeal mass.  OROPHARYNX: Enlarged heterogeneous bilateral palatine tonsils. No   tonsillar or peritonsillar abscess. Associated edema extends into the   soft palate/uvula. Enlarged lingual tonsils.  HYPOPHARYNX/LARYNX: Epiglottis and aryepiglottic folds are normal. Mild   asymmetryof the piriform sinuses with the right nonaerated. False and   true cords appear normal. Subglottic airway is patent.  RETROPHARYNX/PREVERTEBRAL SOFT TISSUES: No retropharyngeal or   prevertebral collection.  ADENOPATHY: Enlarged right jugulodigastric lymph node measuring 2 cm in   diameter. 0.5 cm left jugulodigastric lymph node.  THYROID GLAND: 7 mm nodule left lobe of the thyroid gland.  SUBMANDIBULAR GLANDS AND PAROTID GLANDS: No sialolithiasis or mass lesion.  CERVICAL SPINE: Straightening of the normal cervical lordosis. Disc   heights and vertebral body heights are well-maintained.  UPPER CHEST: 2.6 cm rounded structure by the azygos arch. Question   aneurysmal dilatation of the azygos arch. Adjacent node or mass could not   be excluded. Follow-up chest CT recommended.    IMPRESSION:  Moderate to severe enlargement of the palatine tonsils. Heterogeneous   appearance without evidence of a tonsillar or peritonsillar abscess.  2.6 cm rounded structure by the azygos arch. Question aneurysmal   dilatation of the azygos arch. Adjacent node or mass could not be   excluded. Follow-up chest CT recommended.    --- End of Report ---            YOKO CAMPBELL MD; Attending Radiologist  This document has been electronically signed. Oct  4 2023  7:14PM    < end of copied text >

## 2023-10-04 NOTE — ED PROVIDER NOTE - OBJECTIVE STATEMENT
73yof pmhx of HTN HLD CAD s/p 2 stents on asa and plavix here with R sided throat swelling and diff swallowing since yesterday. pt seen in the ER on 10/2 for chest pain/fever and abd pain ,found to have UTI and started on Keflex. but yesterday with worsening throat pain. seen at urgent care with Neg covid/flu and strep. no meds today. change in voice.

## 2023-10-04 NOTE — ED ADULT NURSE NOTE - OBJECTIVE STATEMENT
Patient came in with R sided throat swelling and diff swallowing since yesterday. pt seen in the ER on 10/2 for chest pain/fever and abd pain. Patient was found to have UTI and started on Keflex. but yesterday with worsening throat pain. Pt was seen on UC. Pt is in no distress. Breathing easy and non labored. Pt is ambulatory. Pt is anxious. Pt's  at bedside. emotional support offered. Pt is alert and orientedX4.

## 2023-10-04 NOTE — ED PROVIDER NOTE - ATTENDING APP SHARED VISIT CONTRIBUTION OF CARE
History and physical as documented above.  Presenting with L neck swelling and ttp, trismus and changes in voice. Protecting airway, low grade fever, but otherwise vitals wnl, satting well on RA, no increased WOB, no stridor or drooling, no tongue elevation but cannot open mouth d/t pain. Will check sepsis labs, strep, RVP, CT neck, ENT consult, antibiotics, likely TBA.

## 2023-10-04 NOTE — ED PROVIDER NOTE - PROGRESS NOTE DETAILS
Judd, PGY2: Patient signed out to me by day team.  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  Labs remarkable for adenovirus.  Per ENT, patient just requires antibiotics.  However, in the setting of his obvious mass with further evaluation and antibiotic requirement, will monitor patient for airway watch and for further evaluation of mass/lymphadenopathy.    PCP is Dr. Basil Dao who admits to Dr. Jose Nguyen,

## 2023-10-04 NOTE — ED ADULT NURSE NOTE - NSFALLUNIVINTERV_ED_ALL_ED
Bed/Stretcher in lowest position, wheels locked, appropriate side rails in place/Call bell, personal items and telephone in reach/Instruct patient to call for assistance before getting out of bed/chair/stretcher/Non-slip footwear applied when patient is off stretcher/[place] to call system/Physically safe environment - no spills, clutter or unnecessary equipment/Purposeful proactive rounding/Room/bathroom lighting operational, light cord in reach

## 2023-10-05 DIAGNOSIS — A41.9 SEPSIS, UNSPECIFIED ORGANISM: ICD-10-CM

## 2023-10-05 DIAGNOSIS — J02.9 ACUTE PHARYNGITIS, UNSPECIFIED: ICD-10-CM

## 2023-10-05 DIAGNOSIS — Z90.722 ACQUIRED ABSENCE OF OVARIES, BILATERAL: Chronic | ICD-10-CM

## 2023-10-05 DIAGNOSIS — I10 ESSENTIAL (PRIMARY) HYPERTENSION: ICD-10-CM

## 2023-10-05 DIAGNOSIS — Z98.890 OTHER SPECIFIED POSTPROCEDURAL STATES: Chronic | ICD-10-CM

## 2023-10-05 DIAGNOSIS — M79.89 OTHER SPECIFIED SOFT TISSUE DISORDERS: ICD-10-CM

## 2023-10-05 DIAGNOSIS — R93.89 ABNORMAL FINDINGS ON DIAGNOSTIC IMAGING OF OTHER SPECIFIED BODY STRUCTURES: ICD-10-CM

## 2023-10-05 DIAGNOSIS — I25.10 ATHEROSCLEROTIC HEART DISEASE OF NATIVE CORONARY ARTERY WITHOUT ANGINA PECTORIS: ICD-10-CM

## 2023-10-05 DIAGNOSIS — E78.5 HYPERLIPIDEMIA, UNSPECIFIED: ICD-10-CM

## 2023-10-05 DIAGNOSIS — Z29.9 ENCOUNTER FOR PROPHYLACTIC MEASURES, UNSPECIFIED: ICD-10-CM

## 2023-10-05 LAB
ALBUMIN SERPL ELPH-MCNC: 3.3 G/DL — SIGNIFICANT CHANGE UP (ref 3.3–5)
ALP SERPL-CCNC: 109 U/L — SIGNIFICANT CHANGE UP (ref 40–120)
ALT FLD-CCNC: 20 U/L — SIGNIFICANT CHANGE UP (ref 10–45)
ANION GAP SERPL CALC-SCNC: 11 MMOL/L — SIGNIFICANT CHANGE UP (ref 5–17)
AST SERPL-CCNC: 22 U/L — SIGNIFICANT CHANGE UP (ref 10–40)
BASOPHILS # BLD AUTO: 0.04 K/UL — SIGNIFICANT CHANGE UP (ref 0–0.2)
BASOPHILS NFR BLD AUTO: 0.8 % — SIGNIFICANT CHANGE UP (ref 0–2)
BILIRUB SERPL-MCNC: 0.4 MG/DL — SIGNIFICANT CHANGE UP (ref 0.2–1.2)
BUN SERPL-MCNC: 10 MG/DL — SIGNIFICANT CHANGE UP (ref 7–23)
CALCIUM SERPL-MCNC: 9.3 MG/DL — SIGNIFICANT CHANGE UP (ref 8.4–10.5)
CHLORIDE SERPL-SCNC: 105 MMOL/L — SIGNIFICANT CHANGE UP (ref 96–108)
CO2 SERPL-SCNC: 20 MMOL/L — LOW (ref 22–31)
CREAT SERPL-MCNC: 0.7 MG/DL — SIGNIFICANT CHANGE UP (ref 0.5–1.3)
CULTURE RESULTS: SIGNIFICANT CHANGE UP
EGFR: 92 ML/MIN/1.73M2 — SIGNIFICANT CHANGE UP
EOSINOPHIL # BLD AUTO: 0.04 K/UL — SIGNIFICANT CHANGE UP (ref 0–0.5)
EOSINOPHIL NFR BLD AUTO: 0.8 % — SIGNIFICANT CHANGE UP (ref 0–6)
GLUCOSE BLDC GLUCOMTR-MCNC: 88 MG/DL — SIGNIFICANT CHANGE UP (ref 70–99)
GLUCOSE SERPL-MCNC: 82 MG/DL — SIGNIFICANT CHANGE UP (ref 70–99)
HCT VFR BLD CALC: 36.2 % — SIGNIFICANT CHANGE UP (ref 34.5–45)
HERPES SIMPLEX VIRUS 1/2 SURVEILLANCE PCR RESULT: SIGNIFICANT CHANGE UP
HERPES SIMPLEX VIRUS 1/2 SURVEILLANCE PCR SOURCE: SIGNIFICANT CHANGE UP
HGB BLD-MCNC: 11.6 G/DL — SIGNIFICANT CHANGE UP (ref 11.5–15.5)
HSV1+2 DNA SPEC QL NAA+PROBE: SIGNIFICANT CHANGE UP
IMM GRANULOCYTES NFR BLD AUTO: 0.2 % — SIGNIFICANT CHANGE UP (ref 0–0.9)
LYMPHOCYTES # BLD AUTO: 1.47 K/UL — SIGNIFICANT CHANGE UP (ref 1–3.3)
LYMPHOCYTES # BLD AUTO: 28.5 % — SIGNIFICANT CHANGE UP (ref 13–44)
MAGNESIUM SERPL-MCNC: 2.4 MG/DL — SIGNIFICANT CHANGE UP (ref 1.6–2.6)
MCHC RBC-ENTMCNC: 29.5 PG — SIGNIFICANT CHANGE UP (ref 27–34)
MCHC RBC-ENTMCNC: 32 GM/DL — SIGNIFICANT CHANGE UP (ref 32–36)
MCV RBC AUTO: 92.1 FL — SIGNIFICANT CHANGE UP (ref 80–100)
MONOCYTES # BLD AUTO: 0.53 K/UL — SIGNIFICANT CHANGE UP (ref 0–0.9)
MONOCYTES NFR BLD AUTO: 10.3 % — SIGNIFICANT CHANGE UP (ref 2–14)
NEUTROPHILS # BLD AUTO: 3.07 K/UL — SIGNIFICANT CHANGE UP (ref 1.8–7.4)
NEUTROPHILS NFR BLD AUTO: 59.4 % — SIGNIFICANT CHANGE UP (ref 43–77)
NIGHT BLUE STAIN TISS: SIGNIFICANT CHANGE UP
NRBC # BLD: 0 /100 WBCS — SIGNIFICANT CHANGE UP (ref 0–0)
PHOSPHATE SERPL-MCNC: 3.2 MG/DL — SIGNIFICANT CHANGE UP (ref 2.5–4.5)
PLATELET # BLD AUTO: 168 K/UL — SIGNIFICANT CHANGE UP (ref 150–400)
POTASSIUM SERPL-MCNC: 3.7 MMOL/L — SIGNIFICANT CHANGE UP (ref 3.5–5.3)
POTASSIUM SERPL-SCNC: 3.7 MMOL/L — SIGNIFICANT CHANGE UP (ref 3.5–5.3)
PROT SERPL-MCNC: 6.6 G/DL — SIGNIFICANT CHANGE UP (ref 6–8.3)
RBC # BLD: 3.93 M/UL — SIGNIFICANT CHANGE UP (ref 3.8–5.2)
RBC # FLD: 13.3 % — SIGNIFICANT CHANGE UP (ref 10.3–14.5)
SODIUM SERPL-SCNC: 136 MMOL/L — SIGNIFICANT CHANGE UP (ref 135–145)
SPECIMEN SOURCE: SIGNIFICANT CHANGE UP
SPECIMEN SOURCE: SIGNIFICANT CHANGE UP
TSH SERPL-MCNC: 1.24 UIU/ML — SIGNIFICANT CHANGE UP (ref 0.27–4.2)
WBC # BLD: 5.16 K/UL — SIGNIFICANT CHANGE UP (ref 3.8–10.5)
WBC # FLD AUTO: 5.16 K/UL — SIGNIFICANT CHANGE UP (ref 3.8–10.5)

## 2023-10-05 PROCEDURE — 99223 1ST HOSP IP/OBS HIGH 75: CPT

## 2023-10-05 PROCEDURE — 93971 EXTREMITY STUDY: CPT | Mod: 26,LT

## 2023-10-05 RX ORDER — LOSARTAN POTASSIUM 100 MG/1
1 TABLET, FILM COATED ORAL
Refills: 0 | DISCHARGE

## 2023-10-05 RX ORDER — ASPIRIN/CALCIUM CARB/MAGNESIUM 324 MG
1 TABLET ORAL
Refills: 0 | DISCHARGE

## 2023-10-05 RX ORDER — HEPARIN SODIUM 5000 [USP'U]/ML
5000 INJECTION INTRAVENOUS; SUBCUTANEOUS EVERY 8 HOURS
Refills: 0 | Status: DISCONTINUED | OUTPATIENT
Start: 2023-10-05 | End: 2023-10-07

## 2023-10-05 RX ORDER — CLOPIDOGREL BISULFATE 75 MG/1
1 TABLET, FILM COATED ORAL
Refills: 0 | DISCHARGE

## 2023-10-05 RX ORDER — ATORVASTATIN CALCIUM 80 MG/1
20 TABLET, FILM COATED ORAL AT BEDTIME
Refills: 0 | Status: DISCONTINUED | OUTPATIENT
Start: 2023-10-05 | End: 2023-10-07

## 2023-10-05 RX ORDER — ACETAMINOPHEN 500 MG
3 TABLET ORAL
Qty: 0 | Refills: 0 | DISCHARGE

## 2023-10-05 RX ORDER — IBUPROFEN 200 MG
1 TABLET ORAL
Qty: 0 | Refills: 0 | DISCHARGE

## 2023-10-05 RX ORDER — MIRTAZAPINE 45 MG/1
0.5 TABLET, ORALLY DISINTEGRATING ORAL
Refills: 0 | DISCHARGE

## 2023-10-05 RX ORDER — AMPICILLIN SODIUM AND SULBACTAM SODIUM 250; 125 MG/ML; MG/ML
3 INJECTION, POWDER, FOR SUSPENSION INTRAMUSCULAR; INTRAVENOUS EVERY 6 HOURS
Refills: 0 | Status: DISCONTINUED | OUTPATIENT
Start: 2023-10-05 | End: 2023-10-05

## 2023-10-05 RX ORDER — ACETAMINOPHEN 500 MG
650 TABLET ORAL EVERY 6 HOURS
Refills: 0 | Status: DISCONTINUED | OUTPATIENT
Start: 2023-10-05 | End: 2023-10-07

## 2023-10-05 RX ORDER — CLOPIDOGREL BISULFATE 75 MG/1
75 TABLET, FILM COATED ORAL DAILY
Refills: 0 | Status: DISCONTINUED | OUTPATIENT
Start: 2023-10-05 | End: 2023-10-07

## 2023-10-05 RX ORDER — ATORVASTATIN CALCIUM 80 MG/1
1 TABLET, FILM COATED ORAL
Refills: 0 | DISCHARGE

## 2023-10-05 RX ORDER — ASPIRIN/CALCIUM CARB/MAGNESIUM 324 MG
81 TABLET ORAL DAILY
Refills: 0 | Status: DISCONTINUED | OUTPATIENT
Start: 2023-10-05 | End: 2023-10-07

## 2023-10-05 RX ADMIN — CLOPIDOGREL BISULFATE 75 MILLIGRAM(S): 75 TABLET, FILM COATED ORAL at 13:00

## 2023-10-05 RX ADMIN — AMPICILLIN SODIUM AND SULBACTAM SODIUM 200 GRAM(S): 250; 125 INJECTION, POWDER, FOR SUSPENSION INTRAMUSCULAR; INTRAVENOUS at 05:53

## 2023-10-05 RX ADMIN — Medication 650 MILLIGRAM(S): at 02:30

## 2023-10-05 RX ADMIN — Medication 81 MILLIGRAM(S): at 12:59

## 2023-10-05 NOTE — H&P ADULT - NSHPREVIEWOFSYSTEMS_GEN_ALL_CORE
REVIEW OF SYSTEMS:  CONSTITUTIONAL: +fever, chills  EYES: No visual disturbances or eye pain  ENMT: +throat/neck pain, dysphagia, odynophagia as per HPI  RESPIRATORY: No shortness of breath or cough  CARDIOVASCULAR: No chest pain or dizziness  GASTROINTESTINAL: No abdominal or epigastric pain. No diarrhea. No melena or hematochezia. +constipation as per HPI  GENITOURINARY: No dysuria or hematuria  NEUROLOGICAL: No headaches, loss of strength or numbness  MUSCULOSKELETAL: No joint pain or swelling; No muscle, back, or extremity pain

## 2023-10-05 NOTE — CONSULT NOTE ADULT - SUBJECTIVE AND OBJECTIVE BOX
Vascular Surgery Consult  Consulting surgical team: vascular surgery  Consulting attending: Zafar    HPI:  73F HTN, HLD, CAD (LAD PCI w/ TITUS 1/2023, LHC 2/2023 mod LAD and LCx diffuse atherosclerosis w/ patent LAD stent, recommended med mgt, on DAPT), BSO (7/2023), ED presentation 10/2 dc'd  w/ Keflex for UTI, now p/w progressive R neck/throat pain. Pt relays R neck/throat pain was present on initial ED presentation, though after d/c she relays progressively worse sx, assc w/ fever, chills, dysphagia, odynophagia, hoarse voice, prompting presentation to UC, who referred her again to ED. Additionally she c/o constipation (last BM 10/3) w/o noticed flatus, and chronic L ankle edema. Otherwise not endorsing HA, visual disturbance, rhinorrhea, CP, palpitations, SOB, cough, abd pain, n/v/d, melena/hematochezia, dysuria/hematuria/frequency, or other complaint.    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 Calcifications within the walls and narrowing of the lumen of the immediately cephalad azygous vein just prior to its anastomosis with the SVC.      PAST MEDICAL HISTORY:  Cyst of right ovary    HTN (hypertension)    HLD (hyperlipidemia)    CAD (coronary artery disease)    Stented coronary artery    Right ovarian cyst    H/O constipation        PAST SURGICAL HISTORY:  No significant past surgical history    H/O benign breast biopsy    H/O colonoscopy    History of D&C    H/O salpingostomy    S/P BSO (bilateral salpingo-oophorectomy)        MEDICATIONS:  acetaminophen     Tablet .. 650 milliGRAM(s) Oral every 6 hours PRN  ampicillin/sulbactam  IVPB 3 Gram(s) IV Intermittent every 6 hours  aspirin enteric coated 81 milliGRAM(s) Oral daily  atorvastatin 20 milliGRAM(s) Oral at bedtime  clopidogrel Tablet 75 milliGRAM(s) Oral daily  heparin   Injectable 5000 Unit(s) SubCutaneous every 8 hours      ALLERGIES:  shellfish (Hives)  sulfADIAZINE (Hives)  Gadavist (Other)  fluoroquinolone antibiotics (Rash)      VITALS & I/Os:  Vital Signs Last 24 Hrs  T(C): 37 (05 Oct 2023 17:54), Max: 37 (05 Oct 2023 06:00)  T(F): 98.6 (05 Oct 2023 17:54), Max: 98.6 (05 Oct 2023 06:00)  HR: 75 (05 Oct 2023 17:54) (75 - 85)  BP: 120/75 (05 Oct 2023 17:54) (120/75 - 150/71)  BP(mean): 84 (05 Oct 2023 02:28) (84 - 84)  RR: 18 (05 Oct 2023 17:54) (17 - 18)  SpO2: 99% (05 Oct 2023 17:54) (98% - 99%)    Parameters below as of 05 Oct 2023 17:21  Patient On (Oxygen Delivery Method): room air        I&O's Summary      PHYSICAL EXAM:  General: Not acutely distressed  Respiratory: Nonlabored respirations on room air  Cardiovascular: Pulse present  Abdominal: Soft, nondistended, nontender. No rebound or guarding. No organomegaly, no palpable mass.  Extremities: Warm  Vascular: palpable DP/femoral/radial pulses bilaterally    LABS:                        11.6   5.16  )-----------( 168      ( 05 Oct 2023 06:58 )             36.2     10-05    136  |  105  |  10  ----------------------------<  82  3.7   |  20<L>  |  0.70    Ca    9.3      05 Oct 2023 06:58  Phos  3.2     10-05  Mg     2.4     10-05    TPro  6.6  /  Alb  3.3  /  TBili  0.4  /  DBili  x   /  AST  22  /  ALT  20  /  AlkPhos  109  10-05    Lactate:    PT/INR - ( 04 Oct 2023 14:55 )   PT: 13.0 sec;   INR: 1.25 ratio         PTT - ( 04 Oct 2023 14:55 )  PTT:38.4 sec          Urinalysis Basic - ( 05 Oct 2023 06:58 )    Color: x / Appearance: x / SG: x / pH: x  Gluc: 82 mg/dL / Ketone: x  / Bili: x / Urobili: x   Blood: x / Protein: x / Nitrite: x   Leuk Esterase: x / RBC: x / WBC x   Sq Epi: x / Non Sq Epi: x / Bacteria: x        IMAGING:

## 2023-10-05 NOTE — ED ADULT NURSE REASSESSMENT NOTE - NS ED NURSE REASSESS COMMENT FT1
[id number] Spoke with ANNIE Meyer in reference to another antibiotic for patient Kendell Meyer reports she will speak with Dr Nguyen.

## 2023-10-05 NOTE — H&P ADULT - PROBLEM SELECTOR PLAN 7
- vte ppx: HSQ  - NPO pending ENT recs in AM  - aspiration precaution  - dispo pending course - VA duplex LLE r/o DVT

## 2023-10-05 NOTE — H&P ADULT - PROBLEM SELECTOR PLAN 3
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

## 2023-10-05 NOTE — H&P ADULT - PROBLEM SELECTOR PLAN 1
SIRS w/ febrile 38.3C, HR > 90 iso adenovirus+, pharyngitis/tonsillitis, submandibular sialadenitis eval'd on CT neck IVC and by ENT   recent UTI dx in ED dc'd on Keflex though UCx w/o growth and procal neg  - c/w Unasyn and f/u uvula/tonsil and other Cx per ENT recs  - f/u furthe ENT recs  - WBC/fever curve  - VS q4h w/o  SIRS w/ febrile 38.3C, HR > 90 iso adenovirus+, pharyngitis/tonsillitis/submandibular sialadenitis eval'd on CT neck IVC and by ENT   recent UTI dx in ED dc'd on Keflex though UCx w/o growth and procal neg, pt w/o urinary sx  - c/w Unasyn and f/u uvula/tonsil and other Cx per ENT recs  - f/u further ENT recs  - WBC/fever curve  - VS q4h w/o

## 2023-10-05 NOTE — H&P ADULT - NSHPSOCIALHISTORY_GEN_ALL_CORE
Denies tobacco, EtOH, or illicit drug use. Lives w/ . Ambulates w/o assistive device. Retired  ([employer]). Born in .

## 2023-10-05 NOTE — H&P ADULT - NSHPLABSRESULTS_GEN_ALL_CORE
EKG personally reviewed sinus rhythm, regular rate 81BPM, intervals grossly wnl including QTc 422ms, w/o overt pathologic-appearing LIN/STD, TW flattening III, TWI aVR

## 2023-10-05 NOTE — H&P ADULT - NSICDXPASTSURGICALHX_GEN_ALL_CORE_FT
PAST SURGICAL HISTORY:  H/O benign breast biopsy     H/O colonoscopy     History of D&C     S/P BSO (bilateral salpingo-oophorectomy)

## 2023-10-05 NOTE — H&P ADULT - NSHPPHYSICALEXAM_GEN_ALL_CORE
PHYSICAL EXAM:  GENERAL: NAD, well-groomed, well-developed, pleasant to interview  HEAD: Atraumatic, Normocephalic  EYES: PERRL, conjunctiva and sclera clear  ENMT: oral MMM, visualized pharynx erythematous and b/l tonsillar exudate  NECK: Supple, R submandibular edema/mild tenderness to palpation  NERVOUS SYSTEM: Alert & Oriented X4, Good concentration; Motor Strength 5/5 B/L upper and lower extremities  CHEST/LUNG: Clear to auscultation bilaterally; No rales, rhonchi, wheezing, or rubs  HEART: Regular rate and rhythm; No murmurs, rubs, or gallops  ABDOMEN: Soft, Nontender, Nondistended; Bowel sounds present. No guarding, rebound tenderness, or rigidity.  EXTREMITIES: 2+ Peripheral Pulses, No edema

## 2023-10-05 NOTE — CONSULT NOTE ADULT - ASSESSMENT
73F pw tonsillitis vs pharyngitis being worked up by ENT. 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    Recommendations:  - No acute vascular surgery intervention. 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    Vascular Surgery  Please page [phone] for all questions. Not available on [employer] Teams.

## 2023-10-05 NOTE — H&P ADULT - ASSESSMENT
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

## 2023-10-05 NOTE — H&P ADULT - HISTORY OF PRESENT ILLNESS
73F HTN, HLD, CAD (LAD PCI w/ TITUS 1/2023, LHC 2/2023 mod LAD and LCx diffuse atherosclerosis w/ patent LAD stent, recommended med mgt, on DAPT), BSO (7/2023), ED presentation 10/2 dc'd from ED w/ Keflex for UTI, now p/w R throat/neck pain, dysphagia, odynophagia, hoarseness, fever x 1 day, referred from .     ED Course:    VS: febrile tmax 38.3C, HR 80s-90s, hypertensive BP 120s-150/70s-80, RR 16-18, SpO2% 95-99 RA    Labs: APTT 38.4, INR 1.25; bicarb 21, aphos 121; VBG 7.36/44/32/25 lact 1.9     Micro: UA mod blood, 6 RBC, not c/f infxn (though 10/3 w/ large LE, 11 WBC, few bact, UCx no growth); adenovirus+; BCx x2, UCx, HSV PCR, Tonsillar Cx in lab    Imaging:    CT neck soft tissue w/ IVC:  - Enlarged heterogeneous bilateral palatine tonsils. No tonsillar or peritonsillar abscess. Associated edema extends into the   soft palate/uvula. Enlarged lingual tonsils  - Mild asymmetry of the piriform sinuses with the right nonaerated  - Enlarged right jugulodigastric lymph node measuring 2 cm in diameter. 0.5 cm left jugulodigastric lymph node.  - 7 mm nodule left lobe of the thyroid gland.  - 2.6 cm rounded structure by the azygos arch. 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 Adjacent node or mass could not   be excluded. Follow-up chest CT recommended.    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 Calcifications within the walls and narrowing of the lumen   of the immediately cephalad azygous vein just prior to its anastomosis   with the SVC.    Received: unasyn 3g IV, ofirmev 1g IV    ENT c/s in ED, exam, mild uvular edema, pharyngeal erythema, tonsillar exudates R>L, b/l neck swelling R>L. Indirect laryngoscopy shows exudates along base of tongue, widely patent airway c/f viral vs bacterial pharyngitis, neck swelling likely submandibular sialedenit  - recs f/u uvula/tonsil Cx, c/w unasyn    admit to medicine for further eval/mgt  73F HTN, HLD, CAD (LAD PCI w/ TITUS 1/2023, LHC 2/2023 mod LAD and LCx diffuse atherosclerosis w/ patent LAD stent, recommended med mgt, on DAPT), BSO (7/2023), ED presentation 10/2 dc'd  w/ Keflex for UTI, now p/w progressive R neck/throat pain. Pt relays R neck/throat pain was present on initial ED presentation, though after d/c she relays progressively worse sx, assc w/ fever, chills, dysphagia, odynophagia, hoarse voice, prompting presentation to UC, who referred her again to ED. Additionally she c/o constipation (last BM 10/3) w/o noticed flatus, and chronic L ankle edema. Otherwise not endorsing HA, visual disturbance, rhinorrhea, CP, palpitations, SOB, cough, abd pain, n/v/d, melena/hematochezia, dysuria/hematuria/frequency, or other complaint.    ED Course:    VS: febrile tmax 38.3C, HR 80s-90s, hypertensive BP 120s-150/70s-80, RR 16-18, SpO2% 95-99 RA    Labs: APTT 38.4, INR 1.25; bicarb 21, aphos 121; VBG 7.36/44/32/25 lact 1.9     Micro: UA mod blood, 6 RBC, not c/f infxn (though 10/3 w/ large LE, 11 WBC, few bact, UCx no growth); adenovirus+; BCx x2, UCx, HSV PCR, Tonsillar Cx in lab    Imaging:    CT neck soft tissue w/ IVC:  - Enlarged heterogeneous bilateral palatine tonsils. No tonsillar or peritonsillar abscess. Associated edema extends into the   soft palate/uvula. Enlarged lingual tonsils  - Mild asymmetry of the piriform sinuses with the right nonaerated  - Enlarged right jugulodigastric lymph node measuring 2 cm in diameter. 0.5 cm left jugulodigastric lymph node.  - 7 mm nodule left lobe of the thyroid gland.  - 2.6 cm rounded structure by the azygos arch. 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 Adjacent node or mass could not   be excluded. Follow-up chest CT recommended.    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 Calcifications within the walls and narrowing of the lumen   of the immediately cephalad azygous vein just prior to its anastomosis   with the SVC.    Received: unasyn 3g IV, ofirmev 1g IV    ENT c/s in ED, exam, mild uvular edema, pharyngeal erythema, tonsillar exudates R>L, b/l neck swelling R>L. Indirect laryngoscopy shows exudates along base of tongue, widely patent airway c/f viral vs bacterial pharyngitis, neck swelling likely submandibular sialadenitis; recs f/u uvula/tonsil Cx, c/w unasyn    admit to medicine for further eval/mgt

## 2023-10-06 LAB — S PYO AG SPEC QL IA: NEGATIVE — SIGNIFICANT CHANGE UP

## 2023-10-06 PROCEDURE — 99222 1ST HOSP IP/OBS MODERATE 55: CPT

## 2023-10-06 RX ORDER — CEPHALEXIN 500 MG
500 CAPSULE ORAL EVERY 12 HOURS
Refills: 0 | Status: DISCONTINUED | OUTPATIENT
Start: 2023-10-06 | End: 2023-10-07

## 2023-10-06 RX ADMIN — CLOPIDOGREL BISULFATE 75 MILLIGRAM(S): 75 TABLET, FILM COATED ORAL at 12:14

## 2023-10-06 RX ADMIN — Medication 500 MILLIGRAM(S): at 17:16

## 2023-10-06 RX ADMIN — ATORVASTATIN CALCIUM 20 MILLIGRAM(S): 80 TABLET, FILM COATED ORAL at 21:59

## 2023-10-06 RX ADMIN — Medication 100 MILLIGRAM(S): at 00:14

## 2023-10-06 RX ADMIN — Medication 100 MILLIGRAM(S): at 06:31

## 2023-10-06 RX ADMIN — Medication 81 MILLIGRAM(S): at 12:14

## 2023-10-06 RX ADMIN — ATORVASTATIN CALCIUM 20 MILLIGRAM(S): 80 TABLET, FILM COATED ORAL at 00:14

## 2023-10-06 NOTE — PROGRESS NOTE ADULT - ASSESSMENT
73F pw tonsillitis vs pharyngitis being worked up by ENT. 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    Recommendations:  - No acute vascular surgery intervention. 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  - Please page with any further questions    Vascular Surgery  [id number]
{\rtf1\ncrpww74080\ansi\kqmovgk6246\ftnbj\uc1\deff0  {\fonttbl{\f0 \fnil Segoe UI;}{\f1 \fnil \fcharset0 Segoe UI;}{\f2 \fnil Times New Anup;}}  {\colortbl ;\raj370\wmccy069\txmw104 ;\red0\green0\blue0 ;\red0\green0\qhwm132 ;\red0\green0\blue0 ;}  {\stylesheet{\f0\fs20 Normal;}{\cs1 Default Paragraph Font;}{\cs2\f0\fs16 Line Number;}{\cs3\f2\fs24\ul\cf3 Hyperlink;}}  {\*\revtbl{Unknown;}}  \gzuovn30071\hpxffo55982\ajtzw6273\puvsb5423\zjffr7876\jxqmv7167\rixseqm998\hkzliyk166\nogrowautofit\hqhtxb474\formshade\nofeaturethrottle1\dntblnsbdb\fet4\aendnotes\aftnnrlc\pgbrdrhead\pgbrdrfoot  \sectd\wmrgve99837\mexlky87535\guttersxn0\vbqznmho7949\mpudzilp3139\mshimhnm6278\jkpjaftn1162\xuwghhd563\ocdnrfb683\sbkpage\pgncont\pgndec  \plain\plain\f0\fs24\ql\plain\f0\fs24\plain\f1\fs16\lspm7854\hich\f1\dbch\f1\loch\f1\cf2\fs16 73F HTN, HLD, CAD (LAD PCI w/ TITUS 1/2023, LHC 2/2023 mod LAD and LCx diffuse atherosclerosis w/ patent LAD stent, recommended med mgt, on DAPT), BSO (7/2023),   ED presentation 10/2 dc'd from ED w/ Keflex for UTI now a/w sepsis 2/2 pharyngitis/tonsillitis/sialedenitis iso adenovirus+, 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 \par  \'b7  {\*\bkmkstart wl37975114434}{\*\bkmkend db33259960892}Plan: cw abx as per ID \par  - f/u further ENT recs\par  - ID fu appreciated \par  \par  \plain\f1\fs16\cuil0091\hich\f1\dbch\f1\loch\f1\cf2\fs16\b\ul{\field{\*\fldinst HYPERLINK [id number] }{\fldrslt Problem/Plan - 2:}}\plain\f1\fs16\brmn9969\hich\f1\dbch\f1\loch\f1\cf2\fs16\ql\par  \'b7  {\*\bkmkstart ne43090557929}{\*\bkmkend qq29398694640}Problem: {\*\bkmkstart tj82775784167}{\*\bkmkend fi02723182415}Acute pharyngitis. \par  \'b7  {\*\bkmkstart ml47604781537}{\*\bkmkend hn79834016643}Plan: {\*\bkmkstart jl55224571120}{\*\bkmkend mt55031529870}as above.\par  \par  \plain\f1\fs16\ewjy7516\hich\f1\dbch\f1\loch\f1\cf2\fs16\b\ul{\field{\*\fldinst HYPERLINK [id number] }{\fldrslt Problem/Plan - 3:}}\plain\f1\fs16\hpqv0599\hich\f1\dbch\f1\loch\f1\cf2\fs16\ql\par  \'b7  {\*\bkmkstart jv40356963879}{\*\bkmkend vb26126458938}Problem: {\*\bkmkstart un26490560344}{\*\bkmkend mg81715830558}Abnormal CT scan. \par  \'b7  {\*\bkmkstart ia40961979621}{\*\bkmkend zh21108109902}Plan: {\*\bkmkstart jt08731052182}{\*\bkmkend yb32217098408}aneurysmal dilation of azygos vein w/ calcification in walls and narrowing of lumen of immediately cephalad azygos vein\par  \plain\f1\fs16\akov1091\hich\f1\dbch\f1\loch\f1\cf2\fs16\strike\plain\f1\fs16\prbf2603\hich\f1\dbch\f1\loch\f1\cf2\fs16 - ENT fu \par  \par  \plain\f1\fs16\lknh8921\hich\f1\dbch\f1\loch\f1\cf2\fs16\b\ul{\field{\*\fldinst HYPERLINK [id number] }{\fldrslt Problem/Plan - 4:}}\plain\f1\fs16\qvkw5622\hich\f1\dbch\f1\loch\f1\cf2\fs16\ql\par  \'b7  {\*\bkmkstart fr88624086747}{\*\bkmkend ph39720106689}Problem: {\*\bkmkstart tb95015943340}{\*\bkmkend aa29975264272}Chronic hypertension. \par  \'b7  {\*\bkmkstart ef74410620822}{\*\bkmkend zb10731073232}Plan: {\*\bkmkstart rj05939273249}{\*\bkmkend fq10123188232}- cw current meds \par  \par  \plain\f1\fs16\qmih5866\hich\f1\dbch\f1\loch\f1\cf2\fs16\b\ul{\field{\*\fldinst HYPERLINK [id number] }{\fldrslt Problem/Plan - 5:}}\plain\f1\fs16\acap2739\hich\f1\dbch\f1\loch\f1\cf2\fs16\ql\par  \'b7  {\*\bkmkstart an67091561648}{\*\bkmkend kg13599770418}Problem: {\*\bkmkstart zf39322932495}{\*\bkmkend sc52595412843}Hyperlipidemia. \par  \'b7  {\*\bkmkstart zl51773708667}{\*\bkmkend by72731678927}Plan: {\*\bkmkstart rh61478533931}{\*\bkmkend jl31367416456}- c/w home statin.\par  \par  \plain\f1\fs16\elgh4685\hich\f1\dbch\f1\loch\f1\cf2\fs16\b\ul{\field{\*\fldinst HYPERLINK [id number] }{\fldrslt Problem/Plan - 6:}}\plain\f1\fs16\nebr9430\hich\f1\dbch\f1\loch\f1\cf2\fs16\ql\par  \'b7  {\*\bkmkstart ki79171020626}{\*\bkmkend js08620568649}Problem: {\*\bkmkstart th72069332648}{\*\bkmkend xw09370664026}CAD (coronary artery disease). \par  \'b7  {\*\bkmkstart ey26664891082}{\*\bkmkend my15948013225}Plan: {\*\bkmkstart mo81588079945}{\*\bkmkend wq49317496450}- c/w home DAPT, statin.\par  \par  \plain\f1\fs16\lxxv1205\hich\f1\dbch\f1\loch\f1\cf2\fs16\b\ul{\field{\*\fldinst HYPERLINK [id number] }{\fldrslt Problem/Plan - 7:}}\plain\f1\fs16\cbyf9784\hich\f1\dbch\f1\loch\f1\cf2\fs16\ql\par  \'b7  {\*\bkmkstart am37383531627}{\*\bkmkend xx08472232309}Problem: {\*\bkmkstart sp00785181073}{\*\bkmkend hv75954967688}Swelling of left lower extremity. \par  \'b7  {\*\bkmkstart ig17269438667}{\*\bkmkend ho98121236993}Plan: {\*\bkmkstart df22002759314}{\*\bkmkend uy29334116753}- VA duplex LLE r/o DVT.\par  \par  \plain\f1\fs16\pbsl2460\hich\f1\dbch\f1\loch\f1\cf2\fs16\b\ul{\field{\*\fldinst HYPERLINK [id number] }{\fldrslt Problem/Plan - 8:}}\plain\f1\fs16\rqta1686\hich\f1\dbch\f1\loch\f1\cf2\fs16\ql\par  \'b7  {\*\bkmkstart xl30405967243}{\*\bkmkend jh53123028056}Problem: {\*\bkmkstart ei49550724351}{\*\bkmkend hu51762105286}Need for prophylactic measure. \par  \'b7  {\*\bkmkstart oz72277922455}{\*\bkmkend hs32175410177}Plan: {\*\bkmkstart nw67900961471}{\*\bkmkend ci25334289446}- VTE ppx: HSQ\par  \plain\f0\fs20\vyvx4117\hich\f0\dbch\f0\loch\f0\fs20\par  }

## 2023-10-06 NOTE — CONSULT NOTE ADULT - ATTENDING COMMENTS
Patient admitted with infection (UTI, pharyngitis, adenovirus). Vascular surgery consulted for aneurysmal dilation of azygous vein. Exam unremarkable. Please consult cardiothoracic surgery for evaluation/management.
73 F HTN, HLD, CAD s/p TITUS on DAPT, BSO (7/2023), ED presentation 10/2 with epigastric pain fevers 102.8 discharged on Keflex for UTI, came back on 10/4 progressive R neck/throat pain  Fever, no leukocytosis  UA equivocal  RVP Adenovirus  CT Chest no pneumonia, note vascular anomaly--defer to team  CT Neck enlargement of palatine tonsils, no abscess  Most likely viral over bacterial process, but give course Keflex given unknowns  Overall, Pharyngitis, adenovirus, fever  - Keflex 500mg q 12, 10 days then DC  - Anticipate rash will get worse before it gets better (but monitor closely for any signs worsening, in setting transition to Keflex)  - DC Clinda  - Supportive care for viral infection  - Monitor for alternate sources  - F/U ENT    Shailesh Sood MD  Contact on TEAMS messaging from 9am - 5pm  From 5pm-9am, on weekends, or if no response call [phone]    I was physically present for the key portions of the evaluation and management service provided. I saw and examined the patient. I agree with the above history, physical, and plan except for any discrepancies which I have documented in “Attending Statements.” Please refer to “Attending Statements” for final plan.

## 2023-10-06 NOTE — PATIENT PROFILE ADULT - FALL HARM RISK - HARM RISK INTERVENTIONS

## 2023-10-06 NOTE — PATIENT PROFILE ADULT - HOME ACCESSIBILITY CONCERNS
[Left] : left shoulder [Calcific density] : Calcific density [de-identified] : Constitutional: The general appearance of the patient is well developed, well nourished, no deformities and well groomed. Normal\par \par Gait: Gait and function is as follows: normal gait.\par \par Skin: Head and neck visualized skin is normal. Left upper extremity visualized skin is normal. Right upper extremity visualized skin is normal. Thoracic Skin of the thoracic spine shows visualized skin is normal.\par \par Cardiovascular: palpable radial pulse bilaterally, good capillary refill in digits of the bilateral upper extremities and no temperature or color changes in the bilateral upper extremities.\par \par Lymphatic: Normal Palpation of lymph nodes in the cervical.\par \par Neurologic: fine motor control in the bilateral upper extremities is intact. Deep Tendon Reflexes in Upper and Lower Extremities Negative Lopez's in the bilateral upper extremities. The patient is oriented to time, place and person. Sensation to light touch intact in the bilateral upper extremities. Mood and Affect is normal.\par \par Right Shoulder: Inspection of the shoulder/upper arm is as follows: There is a full, pain-free, stable range of motion of the shoulder with normal strength and no tenderness to palpation.\par \par Left Shoulder: Inspection of the shoulder/upper arm is as follows: no scapula winging, no biceps deformity and no AC joint deformity. Palpation of the shoulder/upper arm is as follows: There is tenderness at the proximal biceps tendon. Range of motion of the shoulder is as follows: Pain with internal rotation, external rotation, abduction and forward flexion. Strength of the shoulder is as follows: Supraspinatus 4/5. External Rotation 4/5. Internal Rotation 4/5. Deltoid 5/5 Ligament Stability and Special Tests of the shoulder is as follows: Neer test is positive. Quinonez' test is positive. Speed's test is positive.\par \par Neck:\par \par Inspection / Palpation of the cervical spine is as follows: mild paracervical tenderness. Range of motion of the cervical spine is as follows: moderately decreased range of motion of the cervical spine. Stability testing for the cervical spine is as follows Stable range of motion.\par \par Back, including spine: Inspection / Palpation of the thoracic/lumbar spine is as follows: There is a full, pain free, stable range of motion of the thoracic spine with a normal tone and not tenderness to palpation..\par  none

## 2023-10-06 NOTE — SWALLOW BEDSIDE ASSESSMENT ADULT - COMMENTS
ENT- Consult for throat pain. Fiberoptic Indirect Laryngoscopy completed. Mild uvular edema, pharyngeal erythema, tonsillar exudates R>L, b/l neck swelling R>L. Indirect laryngoscopy shows exudates along base of tongue, widely patent airway c/f viral vs bacterial pharyngitis, neck swelling likely submandibular sialadenitis; recs f/u uvula/tonsil Cx, c/w Unasyn. True vocal cords, arytenoids, vestibular folds, ventricles, pyriform sinuses, and aryepiglottic folds appear normal bilaterally. Vocal cords mobile with good contact b/l.    Vascular surgery consulted for incidental finding of CT chest w/ IVC significant for focal aneurysmal dilation of the azygos vein as it curls over the right main bronchus. Calcifications within the walls and narrowing of the lumen of the immediately cephalad azygous vein just prior to its anastomosis with the SVC. Recommendations: no acute vascular surgery intervention.    CXR: No focal consolidations.  CT Neck: Moderate to severe enlargement of the palatine tonsils. Heterogeneous appearance without evidence of a tonsillar or peritonsillar abscess. 2.6 cm rounded structure by the azygos arch. 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 Adjacent node or mass could not be excluded. Follow-up chest CT recommended.

## 2023-10-06 NOTE — SWALLOW BEDSIDE ASSESSMENT ADULT - ASR SWALLOW ASPIRATION MONITOR
Statement Selected
change of breathing pattern/cough/gurgly voice/fever/pneumonia/throat clearing/upper respiratory infection

## 2023-10-06 NOTE — PATIENT PROFILE ADULT - FUNCTIONAL ASSESSMENT - BASIC MOBILITY ASSESSMENT TYPE
Admission
51 yo presents to the ED from home. A&Ox4, ambulatory c/o left side abd pain x 1 1/2 weeks. seen at [facility] 2 days ago, put on antibiotics for uti but did not take medication because she was unable to get it from pharmacy. denies fever, chills. reports pain got worse. reports urinary frequency.  reports bilateral lower back pain. reports decreased appetite. abd soft nondistended, tender upon palpation L side. 20G RAC. plan of care discussed. safety and comfort measures maintained.

## 2023-10-06 NOTE — SWALLOW BEDSIDE ASSESSMENT ADULT - SWALLOW EVAL: DIAGNOSIS
73Y F presenting to ED with dysphagia, odynophagia, and hoarse voice. ENT work up in progress for tonsillitis vs pharyngitis. Bedside swallow evaluation completed revealed overtly functional yvette-pharyngeal swallow mechanism. Oral phase marked by prolonged mastication, which patient reported she is doing to ensure everything she swallows is thoroughly masticated. Pharyngeal phased marked by adequate hyo-laryngeal elevation and timely initiation of the pharyngeal swallow. No overt s/s of laryngeal penetration/aspiration or changes in vocal quality observed across PO trials. Patient endorses discomfort during mealtime, however, did not experience any odynophagia during PO trials. Dietary consult recommended for dietary preferences and supplements given patient preference from easy to chew textures. This service to sign off at this time as per discussion with MEET Bellamy. Should any changes in swallow occur, please reconsult this service. Preparation Of Recipient Site - Flap: The eschar was removed surgically with sharp dissection to facilitate appropriate wound healing of the following adjacent tissue rearrangement.

## 2023-10-06 NOTE — SWALLOW BEDSIDE ASSESSMENT ADULT - PHARYNGEAL PHASE
Adequate hyo-laryngeal elevation and timely initiation of the pharyngeal swallow. No overt s/s of aspiration/penetration or changes in vocal quality noted s/p trials of thin liquid. Adequate hyo-laryngeal elevation and timely trigger of the pharyngeal swallow. No overt s/s of laryngeal penetration/aspiration, changes in vocal quality, or reports of odynophagia.

## 2023-10-06 NOTE — PROGRESS NOTE ADULT - SUBJECTIVE AND OBJECTIVE BOX
SURGERY DAILY PROGRESS NOTE:       SUBJECTIVE/ROS: Patient seen and evaluated on AM rounds.   Denies nausea, vomiting, chest pain, shortness of breath       OBJECTIVE:  Vital Signs Last 24 Hrs  T(C): 36.8 (06 Oct 2023 05:53), Max: 37 (05 Oct 2023 17:54)  T(F): 98.3 (06 Oct 2023 05:53), Max: 98.6 (05 Oct 2023 17:54)  HR: 79 (06 Oct 2023 05:53) (75 - 88)  BP: 107/63 (06 Oct 2023 05:53) (107/63 - 130/78)  BP(mean): --  RR: 18 (06 Oct 2023 05:53) (17 - 18)  SpO2: 97% (06 Oct 2023 05:53) (96% - 99%)    Parameters below as of 06 Oct 2023 05:53  Patient On (Oxygen Delivery Method): room air      I&O's Detail    05 Oct 2023 07:01  -  06 Oct 2023 07:00  --------------------------------------------------------  IN:    IV PiggyBack: 100 mL    Oral Fluid: 200 mL  Total IN: 300 mL    OUT:    Voided (mL): 250 mL  Total OUT: 250 mL    Total NET: 50 mL        Daily Height in cm: 160.02 (05 Oct 2023 22:53)    Daily   MEDICATIONS  (STANDING):  aspirin enteric coated 81 milliGRAM(s) Oral daily  atorvastatin 20 milliGRAM(s) Oral at bedtime  cephalexin 500 milliGRAM(s) Oral every 12 hours  clopidogrel Tablet 75 milliGRAM(s) Oral daily  heparin   Injectable 5000 Unit(s) SubCutaneous every 8 hours    MEDICATIONS  (PRN):  acetaminophen     Tablet .. 650 milliGRAM(s) Oral every 6 hours PRN Temp greater or equal to 38C (100.4F), Mild Pain (1 - 3)      LABS:                        11.6   5.16  )-----------( 168      ( 05 Oct 2023 06:58 )             36.2     10-05    136  |  105  |  10  ----------------------------<  82  3.7   |  20<L>  |  0.70    Ca    9.3      05 Oct 2023 06:58  Phos  3.2     10-05  Mg     2.4     10-05    TPro  6.6  /  Alb  3.3  /  TBili  0.4  /  DBili  x   /  AST  22  /  ALT  20  /  AlkPhos  109  10-05    PT/INR - ( 04 Oct 2023 14:55 )   PT: 13.0 sec;   INR: 1.25 ratio         PTT - ( 04 Oct 2023 14:55 )  PTT:38.4 sec  Urinalysis Basic - ( 05 Oct 2023 06:58 )    Color: x / Appearance: x / SG: x / pH: x  Gluc: 82 mg/dL / Ketone: x  / Bili: x / Urobili: x   Blood: x / Protein: x / Nitrite: x   Leuk Esterase: x / RBC: x / WBC x   Sq Epi: x / Non Sq Epi: x / Bacteria: x    PHYSICAL EXAM:  General: Not acutely distressed  Respiratory: Nonlabored respirations on room air  Cardiovascular: Pulse present  Abdominal: Soft, nondistended, nontender. No rebound or guarding. No organomegaly, no palpable mass.  Extremities: Warm  Vascular: palpable DP/femoral/radial pulses bilaterally  
Patient is a 73y old  Female who presents with a chief complaint of Sepsis 2/2 pharyngitis/tonsillitis, adenovirus+ (06 Oct 2023 12:38)    Date of servie : 10-06-23 @ 14:22  INTERVAL HPI/OVERNIGHT EVENTS:  T(C): 36.7 (10-06-23 @ 13:21), Max: 37 (10-05-23 @ 17:54)  HR: 74 (10-06-23 @ 13:21) (74 - 88)  BP: 134/76 (10-06-23 @ 13:21) (107/63 - 134/76)  RR: 18 (10-06-23 @ 13:21) (17 - 18)  SpO2: 98% (10-06-23 @ 13:21) (96% - 99%)  Wt(kg): --  I&O's Summary    05 Oct 2023 07:01  -  06 Oct 2023 07:00  --------------------------------------------------------  IN: 300 mL / OUT: 250 mL / NET: 50 mL        LABS:                        11.6   5.16  )-----------( 168      ( 05 Oct 2023 06:58 )             36.2     10-05    136  |  105  |  10  ----------------------------<  82  3.7   |  20<L>  |  0.70    Ca    9.3      05 Oct 2023 06:58  Phos  3.2     10-05  Mg     2.4     10-05    TPro  6.6  /  Alb  3.3  /  TBili  0.4  /  DBili  x   /  AST  22  /  ALT  20  /  AlkPhos  109  10-05    PT/INR - ( 04 Oct 2023 14:55 )   PT: 13.0 sec;   INR: 1.25 ratio         PTT - ( 04 Oct 2023 14:55 )  PTT:38.4 sec  Urinalysis Basic - ( 05 Oct 2023 06:58 )    Color: x / Appearance: x / SG: x / pH: x  Gluc: 82 mg/dL / Ketone: x  / Bili: x / Urobili: x   Blood: x / Protein: x / Nitrite: x   Leuk Esterase: x / RBC: x / WBC x   Sq Epi: x / Non Sq Epi: x / Bacteria: x      CAPILLARY BLOOD GLUCOSE            Urinalysis Basic - ( 05 Oct 2023 06:58 )    Color: x / Appearance: x / SG: x / pH: x  Gluc: 82 mg/dL / Ketone: x  / Bili: x / Urobili: x   Blood: x / Protein: x / Nitrite: x   Leuk Esterase: x / RBC: x / WBC x   Sq Epi: x / Non Sq Epi: x / Bacteria: x        MEDICATIONS  (STANDING):  aspirin enteric coated 81 milliGRAM(s) Oral daily  atorvastatin 20 milliGRAM(s) Oral at bedtime  cephalexin 500 milliGRAM(s) Oral every 12 hours  clopidogrel Tablet 75 milliGRAM(s) Oral daily  heparin   Injectable 5000 Unit(s) SubCutaneous every 8 hours    MEDICATIONS  (PRN):  acetaminophen     Tablet .. 650 milliGRAM(s) Oral every 6 hours PRN Temp greater or equal to 38C (100.4F), Mild Pain (1 - 3)          PHYSICAL EXAM:  GENERAL: NAD, well-groomed, well-developed  HEAD:  Atraumatic, Normocephalic  CHEST/LUNG: Clear to percussion bilaterally; No rales, rhonchi, wheezing, or rubs  HEART: Regular rate and rhythm; No murmurs, rubs, or gallops  ABDOMEN: Soft, Nontender, Nondistended; Bowel sounds present  EXTREMITIES:  edema +    Care Discussed with Consultants/Other Providers [x ] YES  [ ] NO
Assessment  DMT2: 70y Male with DM T2 with hyperglycemia, A1C 7.2%, was on oral meds and insulin at home, S/P CABG on insulin, blood sugars improving, now trending within acceptable range, no hypoglycemic episodes, eating partial meals, ambulating, appears comfortable. Planning d/c to [facility] once medically cleared.  Hypothyroidism: Patient has no hx thyroid dz, was not on any thyroid supplements, found  incidentally with TSH 6.22 and was started on synthroid 50mcg PO daily.  CAD: postop CABG 10/2, on medications, no chest pain, stable, monitored.  HTN: Controlled,  on antihypertensive medications.  Parkinson's: on meds, stable.          Kelsie Reece MD  Cell: [phone]  Office: [phone]
Assessment  DMT2: 70y Male with DM T2 with hyperglycemia, A1C 7.2%, was on oral meds and insulin at home, S/P CABG on insulin, blood sugars improving, now trending within acceptable range, no hypoglycemic episodes, eating partial meals, ambulating, appears comfortable. Planning d/c to [facility] once medically cleared.  Hypothyroidism: Patient has no hx thyroid dz, was not on any thyroid supplements, found incidentally with TSH 6.22 and was started on synthroid 50mcg PO daily.  CAD: postop CABG 10/2, on medications, no chest pain, stable, monitored.  HTN: Controlled,  on antihypertensive medications.  Parkinson's: on meds, stable.          Kelsie Reece MD  Cell: [phone]  Office: [phone]

## 2023-10-06 NOTE — CONSULT NOTE ADULT - ASSESSMENT
Patient to be seen today. 73F HTN, HLD, CAD s/p TITUS on DAPT, BSO (7/2023), ED presentation 10/2 dc'd  w/ Keflex for UTI, now p/w progressive R neck/throat pain. Pt relays R neck/throat pain was present on initial ED presentation, though after d/c she relays progressively worse sx, assc w/ fever, chills, dysphagia, odynophagia, hoarse voice, prompting presentation to UC, who referred her again to ED. Additionally she c/o constipation (last BM 10/3) w/o noticed flatus, and chronic L ankle edema. Otherwise not endorsing HA, visual disturbance, rhinorrhea, CP, palpitations, SOB, cough, abd pain, n/v/d, melena/hematochezia, dysuria/hematuria/frequency, or other complaint.    ED Course: febrile tmax 38.3C, HR 80s-90s, hypertensive BP 120s-150/70s-80, RR 16-18, SpO2% 95-99 RA, lact 1.9     Micro: UA mod blood, 6 RBC, 11 WBC, few bact, UCx no growth  RVP adenovirus+  BCx 10/4; NGTD  HSV PCR negative  Tonsillar Cx ? AFB pending  Step A rapid: pending collection    CT neck soft tissue w/ IVC:  - Enlarged heterogeneous bilateral palatine tonsils. No tonsillar or peritonsillar abscess. Associated edema extends into the soft palate/uvula. Enlarged lingual tonsils    CT chest w/ IVC  LUNGS AND    ENT c/s in ED, exam, mild uvular edema, pharyngeal erythema, tonsillar exudates R>L, b/l neck swelling R>L. Indirect laryngoscopy shows exudates along base of tongue, widely patent airway c/f viral vs bacterial pharyngitis, neck swelling likely submandibular sialadenitis; recs f/u uvula/tonsil Cx, c/w unasyn    Unasyn switched to clindamycin after rash    Remains afebrile  Leucocytosis at presentation 12.5 has resolved  Overall feels better    # Acute pharyngitis/tosilitis  - Viral vs bacterial etiology; exudates noted on ENT exam  - No rapid strep/Mono was done; Throat cultures?AFB  - Clinically improved  - Monitor off antibiotics vs  complete abx course with keflex    Incomplete note     73F HTN, HLD, CAD s/p TITUS on DAPT, BSO (7/2023), ED presentation 10/2 with epigastric pain fevers 102.8 discharged on Keflex for UTI, came back on 10/4 progressive R neck/throat pain. Pt relays R neck/throat pain was present on initial ED presentation, though after d/c she relays progressively worse sx, assc w/ fever, chills, dysphagia, odynophagia, hoarse voice, prompting presentation to , who referred her again to ED.  Otherwise no report of HA, visual disturbance, rhinorrhea, CP, palpitations, SOB, cough, abd pain, n/v/d, melena/hematochezia, dysuria/hematuria/frequency, or other complaint.    ED Course: febrile tmax 38.3C, HR 80s-90s, hypertensive BP 120s-150/70s-80, RR 16-18, SpO2% 95-99 RA, lact 1.9     Micro: UA mod blood, 6 RBC, 11 WBC, few bact, UCx no growth  RVP adenovirus+  BCx 10/4; NGTD  HSV PCR negative  Tonsillar Cx ? AFB pending  Step A rapid: pending collection    CT neck soft tissue w/ IVC:  - Enlarged heterogeneous bilateral palatine tonsils. No tonsillar or peritonsillar abscess. Associated edema extends into the soft palate/uvula. Enlarged lingual tonsils    CT chest w/ IVC  LUNGS AND    ENT c/s in ED, exam, mild uvular edema, pharyngeal erythema, tonsillar exudates R>L, b/l neck swelling R>L. Indirect laryngoscopy shows exudates along base of tongue, widely patent airway c/f viral vs bacterial pharyngitis, neck swelling likely submandibular sialadenitis; recs f/u uvula/tonsil Cx, c/w unasyn    Unasyn switched to clindamycin after rash    Remains afebrile  Leucocytosis at presentation 12.5 has resolved  Overall feels better    # Acute pharyngitis/tosilitis  - Initially presented with high fevers 102.8 on 10/2  - Viral vs bacterial etiology; exudates noted on ENT exam  - No rapid strep/Mono was done; Throat cultures?AFB  - Clinically improved on antibiotics, rash with Unasyn  - Would complete 10 days antibiotic course with Keflex    Incomplete note      All recommendations are tentative pending Attending Attestation.    Ismael Lei MD, PGY-4  ID Fellow  [employer] Teams Preferred  After 5pm/weekends call [phone]       73F HTN, HLD, CAD s/p TITUS on DAPT, BSO (7/2023), ED presentation 10/2 with epigastric pain fevers 102.8 discharged on Keflex for UTI, came back on 10/4 progressive R neck/throat pain. Pt relays R neck/throat pain was present on initial ED presentation, though after d/c she relays progressively worse sx, assc w/ fever, chills, dysphagia, odynophagia, hoarse voice, prompting presentation to , who referred her again to ED.  Otherwise no report of HA, visual disturbance, rhinorrhea, CP, palpitations, SOB, cough, abd pain, n/v/d, melena/hematochezia, dysuria/hematuria/frequency, or other complaint.    ED Course: febrile tmax 38.3C, HR 80s-90s, hypertensive BP 120s-150/70s-80, RR 16-18, SpO2% 95-99 RA, lact 1.9     Micro: UA mod blood, 6 RBC, 11 WBC, few bact, UCx no growth  RVP adenovirus+  BCx 10/4; NGTD  HSV PCR negative  Tonsillar Cx ? AFB pending  Step A rapid: pending collection    CT neck soft tissue w/ IVC:  - Enlarged heterogeneous bilateral palatine tonsils. No tonsillar or peritonsillar abscess. Associated edema extends into the soft palate/uvula. Enlarged lingual tonsils    CT chest w/ IVC  LUNGS AND    ENT c/s in ED, exam, mild uvular edema, pharyngeal erythema, tonsillar exudates R>L, b/l neck swelling R>L. Indirect laryngoscopy shows exudates along base of tongue, widely patent airway c/f viral vs bacterial pharyngitis, neck swelling likely submandibular sialadenitis; recs f/u uvula/tonsil Cx, c/w unasyn    Unasyn switched to clindamycin after rash    Remains afebrile  Leucocytosis at presentation 12.5 has resolved  Overall feels better    # Acute pharyngitis/tosilitis  - Initially presented with high fevers 102.8 on 10/2  - Viral vs bacterial etiology; exudates noted on ENT exam  - Clinically improved on antibiotics, rash with Unasyn  - Would complete 10 days antibiotic course with Keflex  - monitor rash for worsening  - ENT follow up    Discussed with attending and primary service    Ismael Lei MD, PGY-4  ID Fellow  [employer] Teams Preferred  After 5pm/weekends call [phone]

## 2023-10-06 NOTE — SWALLOW BEDSIDE ASSESSMENT ADULT - SWALLOW EVAL: CRITERIA FOR SKILLED INTERVENTION MET
Is This A New Presentation, Or A Follow-Up?: Skin Lesion This service to sign off. Should any changes in swallow occur, please reconsult this service./no problems identified which require skilled intervention

## 2023-10-06 NOTE — CONSULT NOTE ADULT - SUBJECTIVE AND OBJECTIVE BOX
Patient is a 73y old  Female who presents with a chief complaint of Sepsis 2/2 pharyngitis/tonsillitis, adenovirus+ (05 Oct 2023 18:09)    HPI:  73F HTN, HLD, CAD s/p TITUS on DAPT, BSO (7/2023), ED presentation 10/2 dc'd  w/ Keflex for UTI, now p/w progressive R neck/throat pain. Pt relays R neck/throat pain was present on initial ED presentation, though after d/c she relays progressively worse sx, assc w/ fever, chills, dysphagia, odynophagia, hoarse voice, prompting presentation to UC, who referred her again to ED. Additionally she c/o constipation (last BM 10/3) w/o noticed flatus, and chronic L ankle edema. Otherwise not endorsing HA, visual disturbance, rhinorrhea, CP, palpitations, SOB, cough, abd pain, n/v/d, melena/hematochezia, dysuria/hematuria/frequency, or other complaint.    ED Course:    VS: febrile tmax 38.3C, HR 80s-90s, hypertensive BP 120s-150/70s-80, RR 16-18, SpO2% 95-99 RA    Labs: APTT 38.4, INR 1.25; bicarb 21, aphos 121; VBG 7.36/44/32/25 lact 1.9     Micro: UA mod blood, 6 RBC, 11 WBC, few bact, UCx no growth; RVP adenovirus+  BCx 10/4; NGTD  HSV PCR negative  Tonsillar Cx  pending    Imaging:    CT neck soft tissue w/ IVC:  - Enlarged heterogeneous bilateral palatine tonsils. No tonsillar or peritonsillar abscess. Associated edema extends into the   soft palate/uvula. Enlarged lingual tonsils  - Mild asymmetry of the piriform sinuses with the right nonaerated  - Enlarged right jugulodigastric lymph node measuring 2 cm in diameter. 0.5 cm left jugulodigastric lymph node.  - 7 mm nodule left lobe of the thyroid gland.  - 2.6 cm rounded structure by the azygos arch. 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 Adjacent node or mass could not   be excluded. Follow-up chest CT recommended.    CT chest w/ IVC  LUNGS AND AIRWAYS: Patent central airways.  No pneumonia, edema, or mass   in the lungs. Mild subsegmental dependent atelectasis.  PLEURA: No pleural effusion.    Received: unasyn 3g IV, ofirmev 1g IV    ENT c/s in ED, exam, mild uvular edema, pharyngeal erythema, tonsillar exudates R>L, b/l neck swelling R>L. Indirect laryngoscopy shows exudates along base of tongue, widely patent airway c/f viral vs bacterial pharyngitis, neck swelling likely submandibular sialadenitis; recs f/u uvula/tonsil Cx, c/w unasyn    admit to medicine for further eval/mgt  (05 Oct 2023 02:19)       ID consulted for abx management, ? allergy to unasyn. Abx switched to Clindamycin.    Remains afebrile  Leucocytosis at presentation 12.5 has resolved      prior hospital charts reviewed [  ]  primary team notes reviewed [ x ]  other consultant notes reviewed [  x]    PAST MEDICAL & SURGICAL HISTORY:  Cyst of right ovary      HTN (hypertension)      HLD (hyperlipidemia)      CAD (coronary artery disease)      Stented coronary artery      Right ovarian cyst      H/O constipation      H/O benign breast biopsy      H/O colonoscopy      History of D&C      S/P BSO (bilateral salpingo-oophorectomy)          Allergies  shellfish (Hives)  sulfADIAZINE (Hives)  Gadavist (Other)  fluoroquinolone antibiotics (Rash)    ANTIMICROBIALS (past 90 days)  MEDICATIONS  (STANDING):  ampicillin/sulbactam  IVPB   200 mL/Hr IV Intermittent (10-04-23 @ 15:16)    ampicillin/sulbactam  IVPB   200 mL/Hr IV Intermittent (10-05-23 @ 05:53)    clindamycin IVPB   100 mL/Hr IV Intermittent (10-06-23 @ 00:14)    clindamycin IVPB   100 mL/Hr IV Intermittent (10-06-23 @ 06:31)        clindamycin IVPB    clindamycin IVPB 900 every 8 hours    MEDICATIONS  (STANDING):  acetaminophen     Tablet .. 650 every 6 hours PRN  aspirin enteric coated 81 daily  atorvastatin 20 at bedtime  clopidogrel Tablet 75 daily  heparin   Injectable 5000 every 8 hours    SOCIAL HISTORY:       FAMILY HISTORY:  FH: leukemia (Sibling)      REVIEW OF SYSTEMS  [  ] ROS unobtainable because:    [  ] All other systems negative except as noted below:	    Constitutional:  [ ] fever [ ] chills  [ ] weight loss  [ ] weakness  Skin:  [ ] rash [ ] phlebitis	  Eyes: [ ] icterus [ ] pain  [ ] discharge	  ENMT: [ ] sore throat  [ ] thrush [ ] ulcers [ ] exudates  Respiratory: [ ] dyspnea [ ] hemoptysis [ ] cough [ ] sputum	  Cardiovascular:  [ ] chest pain [ ] palpitations [ ] edema	  Gastrointestinal:  [ ] nausea [ ] vomiting [ ] diarrhea [ ] constipation [ ] pain	  Genitourinary:  [ ] dysuria [ ] frequency [ ] hematuria [ ] discharge [ ] flank pain  [ ] incontinence  Musculoskeletal:  [ ] myalgias [ ] arthralgias [ ] arthritis  [ ] back pain  Neurological:  [ ] headache [ ] seizures  [ ] confusion/altered mental status  Psychiatric:  [ ] anxiety [ ] depression	  Hematology/Lymphatics:  [ ] lymphadenopathy  Endocrine:  [ ] adrenal [ ] thyroid  Allergic/Immunologic:	 [ ] transplant [ ] seasonal    Vital Signs Last 24 Hrs  T(F): 98.3 (10-06-23 @ 05:53), Max: 102.9 (10-02-23 @ 23:26)  Vital Signs Last 24 Hrs  HR: 79 (10-06-23 @ 05:53) (75 - 88)  BP: 107/63 (10-06-23 @ 05:53) (107/63 - 130/78)  RR: 18 (10-06-23 @ 05:53)  SpO2: 97% (10-06-23 @ 05:53) (96% - 99%)  Wt(kg): --    PHYSICAL EXAM:  Constitutional: non-toxic, no distress  HEAD/EYES: anicteric, no conjunctival injection  ENT:  supple, no thrush  Cardiovascular:   normal S1, S2, no murmur, no edema  Respiratory:  clear BS bilaterally, no wheezes, no rales  GI:  soft, non-tender, normal bowel sounds  :  no gomez, no CVA tenderness  Musculoskeletal:  no synovitis, normal ROM  Neurologic: awake and alert, normal strength, no focal findings  Skin:  no rash, no erythema, no phlebitis  Heme/Onc: no lymphadenopathy   Psychiatric:  awake, alert, appropriate mood                            11.6   5.16  )-----------( 168      ( 05 Oct 2023 06:58 )             36.2   10-05    136  |  105  |  10  ----------------------------<  82  3.7   |  20<L>  |  0.70    Ca    9.3      05 Oct 2023 06:58  Phos  3.2     10-05  Mg     2.4     10-05    TPro  6.6  /  Alb  3.3  /  TBili  0.4  /  DBili  x   /  AST  22  /  ALT  20  /  AlkPhos  109  10-05    Urinalysis Basic - ( 05 Oct 2023 06:58 )    Color: x / Appearance: x / SG: x / pH: x  Gluc: 82 mg/dL / Ketone: x  / Bili: x / Urobili: x   Blood: x / Protein: x / Nitrite: x   Leuk Esterase: x / RBC: x / WBC x   Sq Epi: x / Non Sq Epi: x / Bacteria: x    MICROBIOLOGY:  Culture - Acid Fast - Other w/Smear (collected 04 Oct 2023 18:47)  Source: .Other Other    Culture - Urine (collected 04 Oct 2023 15:55)  Source: Clean Catch Clean Catch (Midstream)  Final Report (05 Oct 2023 16:36):    <10,000 CFU/mL Normal Urogenital Maria A    Culture - Blood (collected 04 Oct 2023 02:45)  Source: .Blood Blood-Peripheral  Preliminary Report (05 Oct 2023 18:02):    No growth at 24 hours    Culture - Blood (collected 04 Oct 2023 02:30)  Source: .Blood Blood-Peripheral  Preliminary Report (05 Oct 2023 18:02):    No growth at 24 hours    Culture - Urine (collected 03 Oct 2023 02:40)  Source: Clean Catch Clean Catch (Midstream)  Final Report (04 Oct 2023 15:52):    <10,000 CFU/mL Normal Urogenital Maria A    Culture - Blood (collected 03 Oct 2023 01:30)  Source: .Blood Blood-Peripheral  Preliminary Report (06 Oct 2023 07:01):    No growth at 72 Hours    Culture - Blood (collected 03 Oct 2023 01:15)  Source: .Blood Blood-Peripheral  Preliminary Report (06 Oct 2023 07:01):    No growth at 72 Hours              Rapid RVP Result: Detected (10-04 @ 14:55)      RADIOLOGY:  imaging below personally reviewed and agree with findings    < from: CT Chest w/ IV Cont (10.04.23 @ 23:22) >  CC: [id number] EXAM:  CT CHEST IC   ORDERED BY:  MERLIN TOWNSEND     PROCEDURE DATE:  10/04/2023          INTERPRETATION:  CLINICAL INFORMATION: Right azygos mass partially   visible on CT neck.    COMPARISON: CT neck earlier same date.    CONTRAST/COMPLICATIONS:  IV Contrast: Omnipaque 350  90 cc administered   10 cc discarded  Oral Contrast: NONE  Complications: None reported at time of study completion    PROCEDURE:  CT of the Chest was performed.  Sagittal and coronal reformats were performed.    FINDINGS:    LUNGS AND AIRWAYS: Patent central airways.  No pneumonia, edema, or mass   in the lungs. Mild subsegmental dependent atelectasis.  PLEURA: No pleural effusion.  MEDIASTINUM AND SWAPNA: No lymphadenopathy.  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 Calcifications within the walls and narrowing of   the lumen of the immediately cephalad azygous vein just prior to its   anastomosis with the SVC. Thoracic aorta, pulmonary arteries   unremarkable. Coronary artery atherosclerosis and stents. Small hiatal   hernia contains a portion of the gastric fundus.  HEART: Heart size is normal. No pericardial effusion.  CHEST WALL AND LOWER NECK: Within normal limits.  VISUALIZED UPPER ABDOMEN: Within normal limits.  BONES: No acute finding. Mild degenerative changes.    IMPRESSION:  No mass or adenopathy.    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 Calcifications within the walls and narrowing of the lumen   of the immediately cephalad azygous vein just prior to its anastomosis   with the SVC.      < from: CT Neck Soft Tissue w/ IV Cont (10.04.23 @ 18:37) >    ACC: [id number] EXAM:  CT NECK SOFT TISSUE IC   ORDERED BY: AUREA LING     PROCEDURE DATE:  10/04/2023          INTERPRETATION:  CT NECK SOFT TISSUE WITH IV CONTRAST    Clinical information: r/o PTA    TECHNIQUE:  A contrast enhanced CT of the neck was performed from the base of the   skull to the thoracic inlet.  Sagittal and coronal reformations were   submitted.  IV Contrast:  Omnipaque 300.  70 cc administered, 30 cc discarded.    COMPARISON:  No prior studies for comparison    FINDINGS:  Exam is limited by artifact from dental amalgam  PARANASAL SINUSES: The visualized paranasal sinuses are well aerated.  MASTOIDS: There is normal aeration of the mastoids and middle ear.  NASOPHARYNX: There is no nasopharyngeal mass.  OROPHARYNX: Enlarged heterogeneous bilateral palatine tonsils. No   tonsillar or peritonsillar abscess. Associated edema extends into the   soft palate/uvula. Enlarged lingual tonsils.  HYPOPHARYNX/LARYNX: Epiglottis and aryepiglottic folds are normal. Mild   asymmetryof the piriform sinuses with the right nonaerated. False and   true cords appear normal. Subglottic airway is patent.  RETROPHARYNX/PREVERTEBRAL SOFT TISSUES: No retropharyngeal or   prevertebral collection.  ADENOPATHY: Enlarged right jugulodigastric lymph node measuring 2 cm in   diameter. 0.5 cm left jugulodigastric lymph node.  THYROID GLAND: 7 mm nodule left lobe of the thyroid gland.  SUBMANDIBULAR GLANDS AND PAROTID GLANDS: No sialolithiasis or mass lesion.  CERVICAL SPINE: Straightening of the normal cervical lordosis. Disc   heights and vertebral body heights are well-maintained.  UPPER CHEST: 2.6 cm rounded structure by the azygos arch. Question   aneurysmal dilatation of the azygos arch. Adjacent node or mass could not   be excluded. Follow-up chest CT recommended.    IMPRESSION:  Moderate to severe enlargement of the palatine tonsils. Heterogeneous   appearance without evidence of a tonsillar or peritonsillar abscess.  2.6 cm rounded structure by the azygos arch. Question aneurysmal   dilatation of the azygos arch. Adjacent node or mass could not be   excluded. Follow-up chest CT recommended.      < end of copied text >        < end of copied text >   Patient is a 73y old  Female who presents with a chief complaint of Sepsis 2/2 pharyngitis/tonsillitis, adenovirus+ (05 Oct 2023 18:09)    HPI:  73F HTN, HLD, CAD s/p TITUS on DAPT, BSO (7/2023), ED presentation 10/2 dc'd  w/ Keflex for UTI, now p/w progressive R neck/throat pain. Pt relays R neck/throat pain was present on initial ED presentation, though after d/c she relays progressively worse sx, assc w/ fever, chills, dysphagia, odynophagia, hoarse voice, prompting presentation to UC, who referred her again to ED. Additionally she c/o constipation (last BM 10/3) w/o noticed flatus, and chronic L ankle edema. Otherwise not endorsing HA, visual disturbance, rhinorrhea, CP, palpitations, SOB, cough, abd pain, n/v/d, melena/hematochezia, dysuria/hematuria/frequency, or other complaint.    ED Course:    VS: febrile tmax 38.3C, HR 80s-90s, hypertensive BP 120s-150/70s-80, RR 16-18, SpO2% 95-99 RA, lact 1.9     Micro: UA mod blood, 6 RBC, 11 WBC, few bact, UCx no growth  RVP adenovirus+  BCx 10/4; NGTD  HSV PCR negative  Tonsillar Cx ? AFB pending  Step A rapid: pending collection    Imaging:    CT neck soft tissue w/ IVC:  - Enlarged heterogeneous bilateral palatine tonsils. No tonsillar or peritonsillar abscess. Associated edema extends into the   soft palate/uvula. Enlarged lingual tonsils  - Mild asymmetry of the piriform sinuses with the right nonaerated  - Enlarged right jugulodigastric lymph node measuring 2 cm in diameter. 0.5 cm left jugulodigastric lymph node.  - 7 mm nodule left lobe of the thyroid gland.  - 2.6 cm rounded structure by the azygos arch. 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 Adjacent node or mass could not   be excluded. Follow-up chest CT recommended.    CT chest w/ IVC  LUNGS AND AIRWAYS: Patent central airways.  No pneumonia, edema, or mass   in the lungs. Mild subsegmental dependent atelectasis.  PLEURA: No pleural effusion.    Received: unasyn 3g IV, ofirmev 1g IV    ENT c/s in ED, exam, mild uvular edema, pharyngeal erythema, tonsillar exudates R>L, b/l neck swelling R>L. Indirect laryngoscopy shows exudates along base of tongue, widely patent airway c/f viral vs bacterial pharyngitis, neck swelling likely submandibular sialadenitis; recs f/u uvula/tonsil Cx, c/w unasyn    admit to medicine for further eval/mgt  (05 Oct 2023 02:19)     ID consulted for abx management, ? allergy to unasyn. Abx switched to Clindamycin.    Remains afebrile  Leucocytosis at presentation 12.5 has resolved  Overall feels better      prior hospital charts reviewed [  ]  primary team notes reviewed [ x ]  other consultant notes reviewed [  x]    PAST MEDICAL & SURGICAL HISTORY:  Cyst of right ovary      HTN (hypertension)      HLD (hyperlipidemia)      CAD (coronary artery disease)      Stented coronary artery      Right ovarian cyst      H/O constipation      H/O benign breast biopsy      H/O colonoscopy      History of D&C      S/P BSO (bilateral salpingo-oophorectomy)          Allergies  shellfish (Hives)  sulfADIAZINE (Hives)  Gadavist (Other)  fluoroquinolone antibiotics (Rash)    ANTIMICROBIALS (past 90 days)  MEDICATIONS  (STANDING):  ampicillin/sulbactam  IVPB   200 mL/Hr IV Intermittent (10-04-23 @ 15:16)    ampicillin/sulbactam  IVPB   200 mL/Hr IV Intermittent (10-05-23 @ 05:53)    clindamycin IVPB   100 mL/Hr IV Intermittent (10-06-23 @ 00:14)    clindamycin IVPB   100 mL/Hr IV Intermittent (10-06-23 @ 06:31)        clindamycin IVPB    clindamycin IVPB 900 every 8 hours    MEDICATIONS  (STANDING):  acetaminophen     Tablet .. 650 every 6 hours PRN  aspirin enteric coated 81 daily  atorvastatin 20 at bedtime  clopidogrel Tablet 75 daily  heparin   Injectable 5000 every 8 hours    SOCIAL HISTORY:  retired, lives with , looks after grandchildren who had viral illness recently    FAMILY HISTORY:  FH: leukemia (Sibling)      REVIEW OF SYSTEMS  [  ] ROS unobtainable because:    [ x ] All other systems negative except as noted below:	    Constitutional:  [ ] fever [ ] chills  [ ] weight loss  [ ] weakness  Skin:  [ ] rash [ ] phlebitis	  Eyes: [ ] icterus [ ] pain  [ ] discharge	  ENMT: [ ] sore throat  [ ] thrush [ ] ulcers [ ] exudates  Respiratory: [ ] dyspnea [ ] hemoptysis [ ] cough [ ] sputum	  Cardiovascular:  [ ] chest pain [ ] palpitations [ ] edema	  Gastrointestinal:  [ ] nausea [ ] vomiting [ ] diarrhea [ ] constipation [ ] pain	  Genitourinary:  [ ] dysuria [ ] frequency [ ] hematuria [ ] discharge [ ] flank pain  [ ] incontinence  Musculoskeletal:  [ ] myalgias [ ] arthralgias [ ] arthritis  [ ] back pain  Neurological:  [ ] headache [ ] seizures  [ ] confusion/altered mental status  Psychiatric:  [ ] anxiety [ ] depression	  Hematology/Lymphatics:  [ ] lymphadenopathy  Endocrine:  [ ] adrenal [ ] thyroid  Allergic/Immunologic:	 [ ] transplant [ ] seasonal    Vital Signs Last 24 Hrs  T(F): 98.3 (10-06-23 @ 05:53), Max: 102.9 (10-02-23 @ 23:26)  Vital Signs Last 24 Hrs  HR: 79 (10-06-23 @ 05:53) (75 - 88)  BP: 107/63 (10-06-23 @ 05:53) (107/63 - 130/78)  RR: 18 (10-06-23 @ 05:53)  SpO2: 97% (10-06-23 @ 05:53) (96% - 99%)  Wt(kg): --    PHYSICAL EXAM:  Constitutional: non-toxic, no distress  HEAD/EYES: anicteric, no conjunctival injection  ENT:  supple, no thrush, Rt submandibular swelling, throat limited visualization of posterior pharynx  Cardiovascular:   normal S1, S2, no murmur, no edema  Respiratory:  clear BS bilaterally, no wheezes, no rales  GI:  soft, non-tender, normal bowel sounds  :  no gomez, no CVA tenderness  Musculoskeletal:  no synovitis, normal ROM  Neurologic: awake and alert, normal strength, no focal findings  Skin: Morbiliform rash trunk, no phlebitis                            11.6   5.16  )-----------( 168      ( 05 Oct 2023 06:58 )             36.2   10-05    136  |  105  |  10  ----------------------------<  82  3.7   |  20<L>  |  0.70    Ca    9.3      05 Oct 2023 06:58  Phos  3.2     10-05  Mg     2.4     10-05    TPro  6.6  /  Alb  3.3  /  TBili  0.4  /  DBili  x   /  AST  22  /  ALT  20  /  AlkPhos  109  10-05    Urinalysis Basic - ( 05 Oct 2023 06:58 )    Color: x / Appearance: x / SG: x / pH: x  Gluc: 82 mg/dL / Ketone: x  / Bili: x / Urobili: x   Blood: x / Protein: x / Nitrite: x   Leuk Esterase: x / RBC: x / WBC x   Sq Epi: x / Non Sq Epi: x / Bacteria: x    MICROBIOLOGY:  Culture - Acid Fast - Other w/Smear (collected 04 Oct 2023 18:47)  Source: .Other Other    Culture - Urine (collected 04 Oct 2023 15:55)  Source: Clean Catch Clean Catch (Midstream)  Final Report (05 Oct 2023 16:36):    <10,000 CFU/mL Normal Urogenital Maria A    Culture - Blood (collected 04 Oct 2023 02:45)  Source: .Blood Blood-Peripheral  Preliminary Report (05 Oct 2023 18:02):    No growth at 24 hours    Culture - Blood (collected 04 Oct 2023 02:30)  Source: .Blood Blood-Peripheral  Preliminary Report (05 Oct 2023 18:02):    No growth at 24 hours    Culture - Urine (collected 03 Oct 2023 02:40)  Source: Clean Catch Clean Catch (Midstream)  Final Report (04 Oct 2023 15:52):    <10,000 CFU/mL Normal Urogenital Maria A    Culture - Blood (collected 03 Oct 2023 01:30)  Source: .Blood Blood-Peripheral  Preliminary Report (06 Oct 2023 07:01):    No growth at 72 Hours    Culture - Blood (collected 03 Oct 2023 01:15)  Source: .Blood Blood-Peripheral  Preliminary Report (06 Oct 2023 07:01):    No growth at 72 Hours              Rapid RVP Result: Detected (10-04 @ 14:55)      RADIOLOGY:  imaging below personally reviewed and agree with findings    < from: CT Chest w/ IV Cont (10.04.23 @ 23:22) >  CC: [id number] EXAM:  CT CHEST IC   ORDERED BY:  MERLIN TOWNSEND     PROCEDURE DATE:  10/04/2023          INTERPRETATION:  CLINICAL INFORMATION: Right azygos mass partially   visible on CT neck.    COMPARISON: CT neck earlier same date.    CONTRAST/COMPLICATIONS:  IV Contrast: Omnipaque 350  90 cc administered   10 cc discarded  Oral Contrast: NONE  Complications: None reported at time of study completion    PROCEDURE:  CT of the Chest was performed.  Sagittal and coronal reformats were performed.    FINDINGS:    LUNGS AND AIRWAYS: Patent central airways.  No pneumonia, edema, or mass   in the lungs. Mild subsegmental dependent atelectasis.  PLEURA: No pleural effusion.  MEDIASTINUM AND SWAPNA: No lymphadenopathy.  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 Calcifications within the walls and narrowing of   the lumen of the immediately cephalad azygous vein just prior to its   anastomosis with the SVC. Thoracic aorta, pulmonary arteries   unremarkable. Coronary artery atherosclerosis and stents. Small hiatal   hernia contains a portion of the gastric fundus.  HEART: Heart size is normal. No pericardial effusion.  CHEST WALL AND LOWER NECK: Within normal limits.  VISUALIZED UPPER ABDOMEN: Within normal limits.  BONES: No acute finding. Mild degenerative changes.    IMPRESSION:  No mass or adenopathy.    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 Calcifications within the walls and narrowing of the lumen   of the immediately cephalad azygous vein just prior to its anastomosis   with the SVC.      < from: CT Neck Soft Tissue w/ IV Cont (10.04.23 @ 18:37) >    ACC: [id number] EXAM:  CT NECK SOFT TISSUE IC   ORDERED BY: AUREA LING     PROCEDURE DATE:  10/04/2023          INTERPRETATION:  CT NECK SOFT TISSUE WITH IV CONTRAST    Clinical information: r/o PTA    TECHNIQUE:  A contrast enhanced CT of the neck was performed from the base of the   skull to the thoracic inlet.  Sagittal and coronal reformations were   submitted.  IV Contrast:  Omnipaque 300.  70 cc administered, 30 cc discarded.    COMPARISON:  No prior studies for comparison    FINDINGS:  Exam is limited by artifact from dental amalgam  PARANASAL SINUSES: The visualized paranasal sinuses are well aerated.  MASTOIDS: There is normal aeration of the mastoids and middle ear.  NASOPHARYNX: There is no nasopharyngeal mass.  OROPHARYNX: Enlarged heterogeneous bilateral palatine tonsils. No   tonsillar or peritonsillar abscess. Associated edema extends into the   soft palate/uvula. Enlarged lingual tonsils.  HYPOPHARYNX/LARYNX: Epiglottis and aryepiglottic folds are normal. Mild   asymmetryof the piriform sinuses with the right nonaerated. False and   true cords appear normal. Subglottic airway is patent.  RETROPHARYNX/PREVERTEBRAL SOFT TISSUES: No retropharyngeal or   prevertebral collection.  ADENOPATHY: Enlarged right jugulodigastric lymph node measuring 2 cm in   diameter. 0.5 cm left jugulodigastric lymph node.  THYROID GLAND: 7 mm nodule left lobe of the thyroid gland.  SUBMANDIBULAR GLANDS AND PAROTID GLANDS: No sialolithiasis or mass lesion.  CERVICAL SPINE: Straightening of the normal cervical lordosis. Disc   heights and vertebral body heights are well-maintained.  UPPER CHEST: 2.6 cm rounded structure by the azygos arch. Question   aneurysmal dilatation of the azygos arch. Adjacent node or mass could not   be excluded. Follow-up chest CT recommended.    IMPRESSION:  Moderate to severe enlargement of the palatine tonsils. Heterogeneous   appearance without evidence of a tonsillar or peritonsillar abscess.  2.6 cm rounded structure by the azygos arch. Question aneurysmal   dilatation of the azygos arch. Adjacent node or mass could not be   excluded. Follow-up chest CT recommended.      < end of copied text >   Patient is a 73y old  Female who presents with a chief complaint of Sepsis 2/2 pharyngitis/tonsillitis, adenovirus+ (05 Oct 2023 18:09)    HPI:  73F HTN, HLD, CAD s/p TITUS on DAPT, BSO (7/2023), ED presentation 10/2 with epigastric pain fevers 102.8 discharged on Keflex for UTI, came back on 10/4 progressive R neck/throat pain. Pt relays R neck/throat pain was present on initial ED presentation, though after d/c she relays progressively worse sx, assc w/ fever, chills, dysphagia, odynophagia, hoarse voice, prompting presentation to , who referred her again to ED.  Otherwise no report of HA, visual disturbance, rhinorrhea, CP, palpitations, SOB, cough, abd pain, n/v/d, melena/hematochezia, dysuria/hematuria/frequency, or other complaint.    ED Course: febrile tmax 38.3C, HR 80s-90s, hypertensive BP 120s-150/70s-80, RR 16-18, SpO2% 95-99 RA, lact 1.9     Micro: UA mod blood, 6 RBC, 11 WBC, few bact, UCx no growth  RVP adenovirus+  BCx 10/4; NGTD  HSV PCR negative  Tonsillar Cx ? AFB pending  Step A rapid: pending collection    Imaging:    CT neck soft tissue w/ IVC:  - Enlarged heterogeneous bilateral palatine tonsils. No tonsillar or peritonsillar abscess. Associated edema extends into the   soft palate/uvula. Enlarged lingual tonsils  - Mild asymmetry of the piriform sinuses with the right nonaerated  - Enlarged right jugulodigastric lymph node measuring 2 cm in diameter. 0.5 cm left jugulodigastric lymph node.  - 7 mm nodule left lobe of the thyroid gland.  - 2.6 cm rounded structure by the azygos arch. 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 Adjacent node or mass could not   be excluded. Follow-up chest CT recommended.    CT chest w/ IVC  LUNGS AND AIRWAYS: Patent central airways.  No pneumonia, edema, or mass   in the lungs. Mild subsegmental dependent atelectasis.  PLEURA: No pleural effusion.    Received: unasyn 3g IV, ofirmev 1g IV    ENT c/s in ED, exam, mild uvular edema, pharyngeal erythema, tonsillar exudates R>L, b/l neck swelling R>L. Indirect laryngoscopy shows exudates along base of tongue, widely patent airway c/f viral vs bacterial pharyngitis, neck swelling likely submandibular sialadenitis; recs f/u uvula/tonsil Cx, c/w unasyn    admit to medicine for further eval/mgt  (05 Oct 2023 02:19)     ID consulted for abx management, ? allergy to unasyn. Abx switched to Clindamycin.    Remains afebrile  Leucocytosis at presentation 12.5 has resolved  Overall feels better      prior hospital charts reviewed [  ]  primary team notes reviewed [ x ]  other consultant notes reviewed [  x]    PAST MEDICAL & SURGICAL HISTORY:  Cyst of right ovary      HTN (hypertension)      HLD (hyperlipidemia)      CAD (coronary artery disease)      Stented coronary artery      Right ovarian cyst      H/O constipation      H/O benign breast biopsy      H/O colonoscopy      History of D&C      S/P BSO (bilateral salpingo-oophorectomy)          Allergies  shellfish (Hives)  sulfADIAZINE (Hives)  Gadavist (Other)  fluoroquinolone antibiotics (Rash)    ANTIMICROBIALS (past 90 days)  MEDICATIONS  (STANDING):  ampicillin/sulbactam  IVPB   200 mL/Hr IV Intermittent (10-04-23 @ 15:16)    ampicillin/sulbactam  IVPB   200 mL/Hr IV Intermittent (10-05-23 @ 05:53)    clindamycin IVPB   100 mL/Hr IV Intermittent (10-06-23 @ 00:14)    clindamycin IVPB   100 mL/Hr IV Intermittent (10-06-23 @ 06:31)        clindamycin IVPB    clindamycin IVPB 900 every 8 hours    MEDICATIONS  (STANDING):  acetaminophen     Tablet .. 650 every 6 hours PRN  aspirin enteric coated 81 daily  atorvastatin 20 at bedtime  clopidogrel Tablet 75 daily  heparin   Injectable 5000 every 8 hours    SOCIAL HISTORY:  retired, lives with , looks after grandchildren who had viral illness recently    FAMILY HISTORY:  FH: leukemia (Sibling)      REVIEW OF SYSTEMS  [  ] ROS unobtainable because:    [ x ] All other systems negative except as noted below:	    Constitutional:  [ ] fever [ ] chills  [ ] weight loss  [ ] weakness  Skin:  [ ] rash [ ] phlebitis	  Eyes: [ ] icterus [ ] pain  [ ] discharge	  ENMT: [ ] sore throat  [ ] thrush [ ] ulcers [ ] exudates  Respiratory: [ ] dyspnea [ ] hemoptysis [ ] cough [ ] sputum	  Cardiovascular:  [ ] chest pain [ ] palpitations [ ] edema	  Gastrointestinal:  [ ] nausea [ ] vomiting [ ] diarrhea [ ] constipation [ ] pain	  Genitourinary:  [ ] dysuria [ ] frequency [ ] hematuria [ ] discharge [ ] flank pain  [ ] incontinence  Musculoskeletal:  [ ] myalgias [ ] arthralgias [ ] arthritis  [ ] back pain  Neurological:  [ ] headache [ ] seizures  [ ] confusion/altered mental status  Psychiatric:  [ ] anxiety [ ] depression	  Hematology/Lymphatics:  [ ] lymphadenopathy  Endocrine:  [ ] adrenal [ ] thyroid  Allergic/Immunologic:	 [ ] transplant [ ] seasonal    Vital Signs Last 24 Hrs  T(F): 98.3 (10-06-23 @ 05:53), Max: 102.9 (10-02-23 @ 23:26)  Vital Signs Last 24 Hrs  HR: 79 (10-06-23 @ 05:53) (75 - 88)  BP: 107/63 (10-06-23 @ 05:53) (107/63 - 130/78)  RR: 18 (10-06-23 @ 05:53)  SpO2: 97% (10-06-23 @ 05:53) (96% - 99%)  Wt(kg): --    PHYSICAL EXAM:  Constitutional: non-toxic, no distress  HEAD/EYES: anicteric, no conjunctival injection  ENT:  supple, no thrush, Rt submandibular swelling, throat limited visualization of posterior pharynx  Cardiovascular:   normal S1, S2, no murmur, no edema  Respiratory:  clear BS bilaterally, no wheezes, no rales  GI:  soft, non-tender, normal bowel sounds  :  no gomez, no CVA tenderness  Musculoskeletal:  no synovitis, normal ROM  Neurologic: awake and alert, normal strength, no focal findings  Skin: Morbiliform rash trunk, no phlebitis                            11.6   5.16  )-----------( 168      ( 05 Oct 2023 06:58 )             36.2   10-05    136  |  105  |  10  ----------------------------<  82  3.7   |  20<L>  |  0.70    Ca    9.3      05 Oct 2023 06:58  Phos  3.2     10-05  Mg     2.4     10-05    TPro  6.6  /  Alb  3.3  /  TBili  0.4  /  DBili  x   /  AST  22  /  ALT  20  /  AlkPhos  109  10-05    Urinalysis Basic - ( 05 Oct 2023 06:58 )    Color: x / Appearance: x / SG: x / pH: x  Gluc: 82 mg/dL / Ketone: x  / Bili: x / Urobili: x   Blood: x / Protein: x / Nitrite: x   Leuk Esterase: x / RBC: x / WBC x   Sq Epi: x / Non Sq Epi: x / Bacteria: x    MICROBIOLOGY:  Culture - Acid Fast - Other w/Smear (collected 04 Oct 2023 18:47)  Source: .Other Other    Culture - Urine (collected 04 Oct 2023 15:55)  Source: Clean Catch Clean Catch (Midstream)  Final Report (05 Oct 2023 16:36):    <10,000 CFU/mL Normal Urogenital Maria A    Culture - Blood (collected 04 Oct 2023 02:45)  Source: .Blood Blood-Peripheral  Preliminary Report (05 Oct 2023 18:02):    No growth at 24 hours    Culture - Blood (collected 04 Oct 2023 02:30)  Source: .Blood Blood-Peripheral  Preliminary Report (05 Oct 2023 18:02):    No growth at 24 hours    Culture - Urine (collected 03 Oct 2023 02:40)  Source: Clean Catch Clean Catch (Midstream)  Final Report (04 Oct 2023 15:52):    <10,000 CFU/mL Normal Urogenital Maria A    Culture - Blood (collected 03 Oct 2023 01:30)  Source: .Blood Blood-Peripheral  Preliminary Report (06 Oct 2023 07:01):    No growth at 72 Hours    Culture - Blood (collected 03 Oct 2023 01:15)  Source: .Blood Blood-Peripheral  Preliminary Report (06 Oct 2023 07:01):    No growth at 72 Hours              Rapid RVP Result: Detected (10-04 @ 14:55)      RADIOLOGY:  imaging below personally reviewed and agree with findings    < from: CT Chest w/ IV Cont (10.04.23 @ 23:22) >  CC: [id number] EXAM:  CT CHEST IC   ORDERED BY:  MERLIN TOWNSEND     PROCEDURE DATE:  10/04/2023          INTERPRETATION:  CLINICAL INFORMATION: Right azygos mass partially   visible on CT neck.    COMPARISON: CT neck earlier same date.    CONTRAST/COMPLICATIONS:  IV Contrast: Omnipaque 350  90 cc administered   10 cc discarded  Oral Contrast: NONE  Complications: None reported at time of study completion    PROCEDURE:  CT of the Chest was performed.  Sagittal and coronal reformats were performed.    FINDINGS:    LUNGS AND AIRWAYS: Patent central airways.  No pneumonia, edema, or mass   in the lungs. Mild subsegmental dependent atelectasis.  PLEURA: No pleural effusion.  MEDIASTINUM AND SWAPNA: No lymphadenopathy.  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 Calcifications within the walls and narrowing of   the lumen of the immediately cephalad azygous vein just prior to its   anastomosis with the SVC. Thoracic aorta, pulmonary arteries   unremarkable. Coronary artery atherosclerosis and stents. Small hiatal   hernia contains a portion of the gastric fundus.  HEART: Heart size is normal. No pericardial effusion.  CHEST WALL AND LOWER NECK: Within normal limits.  VISUALIZED UPPER ABDOMEN: Within normal limits.  BONES: No acute finding. Mild degenerative changes.    IMPRESSION:  No mass or adenopathy.    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 Calcifications within the walls and narrowing of the lumen   of the immediately cephalad azygous vein just prior to its anastomosis   with the SVC.      < from: CT Neck Soft Tissue w/ IV Cont (10.04.23 @ 18:37) >    ACC: [id number] EXAM:  CT NECK SOFT TISSUE IC   ORDERED BY: AUREA LING     PROCEDURE DATE:  10/04/2023          INTERPRETATION:  CT NECK SOFT TISSUE WITH IV CONTRAST    Clinical information: r/o PTA    TECHNIQUE:  A contrast enhanced CT of the neck was performed from the base of the   skull to the thoracic inlet.  Sagittal and coronal reformations were   submitted.  IV Contrast:  Omnipaque 300.  70 cc administered, 30 cc discarded.    COMPARISON:  No prior studies for comparison    FINDINGS:  Exam is limited by artifact from dental amalgam  PARANASAL SINUSES: The visualized paranasal sinuses are well aerated.  MASTOIDS: There is normal aeration of the mastoids and middle ear.  NASOPHARYNX: There is no nasopharyngeal mass.  OROPHARYNX: Enlarged heterogeneous bilateral palatine tonsils. No   tonsillar or peritonsillar abscess. Associated edema extends into the   soft palate/uvula. Enlarged lingual tonsils.  HYPOPHARYNX/LARYNX: Epiglottis and aryepiglottic folds are normal. Mild   asymmetryof the piriform sinuses with the right nonaerated. False and   true cords appear normal. Subglottic airway is patent.  RETROPHARYNX/PREVERTEBRAL SOFT TISSUES: No retropharyngeal or   prevertebral collection.  ADENOPATHY: Enlarged right jugulodigastric lymph node measuring 2 cm in   diameter. 0.5 cm left jugulodigastric lymph node.  THYROID GLAND: 7 mm nodule left lobe of the thyroid gland.  SUBMANDIBULAR GLANDS AND PAROTID GLANDS: No sialolithiasis or mass lesion.  CERVICAL SPINE: Straightening of the normal cervical lordosis. Disc   heights and vertebral body heights are well-maintained.  UPPER CHEST: 2.6 cm rounded structure by the azygos arch. Question   aneurysmal dilatation of the azygos arch. Adjacent node or mass could not   be excluded. Follow-up chest CT recommended.    IMPRESSION:  Moderate to severe enlargement of the palatine tonsils. Heterogeneous   appearance without evidence of a tonsillar or peritonsillar abscess.  2.6 cm rounded structure by the azygos arch. Question aneurysmal   dilatation of the azygos arch. Adjacent node or mass could not be   excluded. Follow-up chest CT recommended.      < end of copied text >

## 2023-10-06 NOTE — SWALLOW BEDSIDE ASSESSMENT ADULT - SLP PERTINENT HISTORY OF CURRENT PROBLEM
3F HTN, HLD, CAD (LAD PCI w/ TITUS 1/2023, LHC 2/2023 mod LAD and LCx diffuse atherosclerosis w/ patent LAD stent, recommended med mgt, on DAPT), BSO (7/2023), ED presentation 10/2 dc'd  w/ Keflex for UTI, now p/w progressive R neck/throat pain. Pt relays R neck/throat pain was present on initial ED presentation, though after d/c she relays progressively worse sx, assc w/ fever, chills, dysphagia, odynophagia, hoarse voice, prompting presentation to UC, who referred her again to ED. Additionally she c/o constipation (last BM 10/3) w/o noticed flatus, and chronic L ankle edema. Otherwise not endorsing HA, visual disturbance, rhinorrhea, CP, palpitations, SOB, cough, abd pain, n/v/d, melena/hematochezia, dysuria/hematuria/frequency, or other complaint. 73F HTN, HLD, CAD (LAD PCI w/ TITUS 1/2023, LHC 2/2023 mod LAD and LCx diffuse atherosclerosis w/ patent LAD stent, recommended med mgt, on DAPT), BSO (7/2023), ED presentation 10/2 dc'd  w/ Keflex for UTI, now p/w progressive R neck/throat pain. Pt relays R neck/throat pain was present on initial ED presentation, though after d/c she relays progressively worse sx, assc w/ fever, chills, dysphagia, odynophagia, hoarse voice, prompting presentation to UC, who referred her again to ED. Additionally she c/o constipation (last BM 10/3) w/o noticed flatus, and chronic L ankle edema. Otherwise not endorsing HA, visual disturbance, rhinorrhea, CP, palpitations, SOB, cough, abd pain, n/v/d, melena/hematochezia, dysuria/hematuria/frequency, or other complaint.

## 2023-10-06 NOTE — SWALLOW BEDSIDE ASSESSMENT ADULT - CONSISTENCIES ADMINISTERED
thin liquid pureed/minced & moist/soft & bite-sized/easy to chew/regular solid/slightly thick liquid (Pediatrics only)

## 2023-10-06 NOTE — SWALLOW BEDSIDE ASSESSMENT ADULT - ORAL PREPARATORY PHASE
Within functional limits Slow but adequate mastication. Patient endorses extended chewing to ensure not larger pieces are swallow as that is what triggers her odynophagia.

## 2023-10-07 ENCOUNTER — TRANSCRIPTION ENCOUNTER (OUTPATIENT)
Age: 73
End: 2023-10-07

## 2023-10-07 VITALS
OXYGEN SATURATION: 97 % | SYSTOLIC BLOOD PRESSURE: 117 MMHG | RESPIRATION RATE: 18 BRPM | HEART RATE: 72 BPM | TEMPERATURE: 98 F | DIASTOLIC BLOOD PRESSURE: 72 MMHG

## 2023-10-07 LAB
ANION GAP SERPL CALC-SCNC: 15 MMOL/L — SIGNIFICANT CHANGE UP (ref 5–17)
BUN SERPL-MCNC: 10 MG/DL — SIGNIFICANT CHANGE UP (ref 7–23)
CALCIUM SERPL-MCNC: 9.5 MG/DL — SIGNIFICANT CHANGE UP (ref 8.4–10.5)
CHLORIDE SERPL-SCNC: 106 MMOL/L — SIGNIFICANT CHANGE UP (ref 96–108)
CO2 SERPL-SCNC: 21 MMOL/L — LOW (ref 22–31)
CREAT SERPL-MCNC: 0.73 MG/DL — SIGNIFICANT CHANGE UP (ref 0.5–1.3)
EGFR: 87 ML/MIN/1.73M2 — SIGNIFICANT CHANGE UP
GLUCOSE SERPL-MCNC: 94 MG/DL — SIGNIFICANT CHANGE UP (ref 70–99)
HCT VFR BLD CALC: 35.6 % — SIGNIFICANT CHANGE UP (ref 34.5–45)
HGB BLD-MCNC: 11.7 G/DL — SIGNIFICANT CHANGE UP (ref 11.5–15.5)
MCHC RBC-ENTMCNC: 29.5 PG — SIGNIFICANT CHANGE UP (ref 27–34)
MCHC RBC-ENTMCNC: 32.9 GM/DL — SIGNIFICANT CHANGE UP (ref 32–36)
MCV RBC AUTO: 89.9 FL — SIGNIFICANT CHANGE UP (ref 80–100)
NRBC # BLD: 0 /100 WBCS — SIGNIFICANT CHANGE UP (ref 0–0)
PLATELET # BLD AUTO: 240 K/UL — SIGNIFICANT CHANGE UP (ref 150–400)
POTASSIUM SERPL-MCNC: 4.1 MMOL/L — SIGNIFICANT CHANGE UP (ref 3.5–5.3)
POTASSIUM SERPL-SCNC: 4.1 MMOL/L — SIGNIFICANT CHANGE UP (ref 3.5–5.3)
RBC # BLD: 3.96 M/UL — SIGNIFICANT CHANGE UP (ref 3.8–5.2)
RBC # FLD: 13.2 % — SIGNIFICANT CHANGE UP (ref 10.3–14.5)
SODIUM SERPL-SCNC: 142 MMOL/L — SIGNIFICANT CHANGE UP (ref 135–145)
WBC # BLD: 4.76 K/UL — SIGNIFICANT CHANGE UP (ref 3.8–10.5)
WBC # FLD AUTO: 4.76 K/UL — SIGNIFICANT CHANGE UP (ref 3.8–10.5)

## 2023-10-07 PROCEDURE — 84484 ASSAY OF TROPONIN QUANT: CPT

## 2023-10-07 PROCEDURE — 82435 ASSAY OF BLOOD CHLORIDE: CPT

## 2023-10-07 PROCEDURE — 87880 STREP A ASSAY W/OPTIC: CPT

## 2023-10-07 PROCEDURE — 99285 EMERGENCY DEPT VISIT HI MDM: CPT

## 2023-10-07 PROCEDURE — 80053 COMPREHEN METABOLIC PANEL: CPT

## 2023-10-07 PROCEDURE — 83690 ASSAY OF LIPASE: CPT

## 2023-10-07 PROCEDURE — 82962 GLUCOSE BLOOD TEST: CPT

## 2023-10-07 PROCEDURE — 85610 PROTHROMBIN TIME: CPT

## 2023-10-07 PROCEDURE — 36415 COLL VENOUS BLD VENIPUNCTURE: CPT

## 2023-10-07 PROCEDURE — 87040 BLOOD CULTURE FOR BACTERIA: CPT

## 2023-10-07 PROCEDURE — 87116 MYCOBACTERIA CULTURE: CPT

## 2023-10-07 PROCEDURE — 87798 DETECT AGENT NOS DNA AMP: CPT

## 2023-10-07 PROCEDURE — 80048 BASIC METABOLIC PNL TOTAL CA: CPT

## 2023-10-07 PROCEDURE — 96375 TX/PRO/DX INJ NEW DRUG ADDON: CPT

## 2023-10-07 PROCEDURE — 83605 ASSAY OF LACTIC ACID: CPT

## 2023-10-07 PROCEDURE — 85027 COMPLETE CBC AUTOMATED: CPT

## 2023-10-07 PROCEDURE — 87206 SMEAR FLUORESCENT/ACID STAI: CPT

## 2023-10-07 PROCEDURE — 74177 CT ABD & PELVIS W/CONTRAST: CPT | Mod: MA

## 2023-10-07 PROCEDURE — 71045 X-RAY EXAM CHEST 1 VIEW: CPT

## 2023-10-07 PROCEDURE — 85730 THROMBOPLASTIN TIME PARTIAL: CPT

## 2023-10-07 PROCEDURE — 83880 ASSAY OF NATRIURETIC PEPTIDE: CPT

## 2023-10-07 PROCEDURE — 96374 THER/PROPH/DIAG INJ IV PUSH: CPT

## 2023-10-07 PROCEDURE — 71260 CT THORAX DX C+: CPT | Mod: MA

## 2023-10-07 PROCEDURE — 84443 ASSAY THYROID STIM HORMONE: CPT

## 2023-10-07 PROCEDURE — 92610 EVALUATE SWALLOWING FUNCTION: CPT

## 2023-10-07 PROCEDURE — 84295 ASSAY OF SERUM SODIUM: CPT

## 2023-10-07 PROCEDURE — 87086 URINE CULTURE/COLONY COUNT: CPT

## 2023-10-07 PROCEDURE — 82947 ASSAY GLUCOSE BLOOD QUANT: CPT

## 2023-10-07 PROCEDURE — 0225U NFCT DS DNA&RNA 21 SARSCOV2: CPT

## 2023-10-07 PROCEDURE — 85014 HEMATOCRIT: CPT

## 2023-10-07 PROCEDURE — 82803 BLOOD GASES ANY COMBINATION: CPT

## 2023-10-07 PROCEDURE — 83735 ASSAY OF MAGNESIUM: CPT

## 2023-10-07 PROCEDURE — 84132 ASSAY OF SERUM POTASSIUM: CPT

## 2023-10-07 PROCEDURE — 87081 CULTURE SCREEN ONLY: CPT

## 2023-10-07 PROCEDURE — 82330 ASSAY OF CALCIUM: CPT

## 2023-10-07 PROCEDURE — 84100 ASSAY OF PHOSPHORUS: CPT

## 2023-10-07 PROCEDURE — 87529 HSV DNA AMP PROBE: CPT

## 2023-10-07 PROCEDURE — 84145 PROCALCITONIN (PCT): CPT

## 2023-10-07 PROCEDURE — 93005 ELECTROCARDIOGRAM TRACING: CPT

## 2023-10-07 PROCEDURE — 93971 EXTREMITY STUDY: CPT

## 2023-10-07 PROCEDURE — 70491 CT SOFT TISSUE NECK W/DYE: CPT | Mod: MA

## 2023-10-07 PROCEDURE — 85018 HEMOGLOBIN: CPT

## 2023-10-07 PROCEDURE — 81001 URINALYSIS AUTO W/SCOPE: CPT

## 2023-10-07 PROCEDURE — 85025 COMPLETE CBC W/AUTO DIFF WBC: CPT

## 2023-10-07 RX ORDER — CEPHALEXIN 500 MG
1 CAPSULE ORAL
Qty: 18 | Refills: 0
Start: 2023-10-07 | End: 2023-10-15

## 2023-10-07 RX ADMIN — Medication 500 MILLIGRAM(S): at 06:32

## 2023-10-07 RX ADMIN — Medication 81 MILLIGRAM(S): at 12:01

## 2023-10-07 RX ADMIN — CLOPIDOGREL BISULFATE 75 MILLIGRAM(S): 75 TABLET, FILM COATED ORAL at 12:01

## 2023-10-07 NOTE — DISCHARGE NOTE NURSING/CASE MANAGEMENT/SOCIAL WORK - NSDCPEFALRISK_GEN_ALL_CORE
For information on Fall & Injury Prevention, visit: [email or web address] OR  [email or web address] OR  https://www.cdc.gov/steadi/patient.html

## 2023-10-07 NOTE — DISCHARGE NOTE NURSING/CASE MANAGEMENT/SOCIAL WORK - NSDCFUADDAPPT_GEN_ALL_CORE_FT
Please call for appointment with ENT Dr Laird/Mauro/Tarun/Paulo @ [phone]. Add: [address], [place] [address].)

## 2023-10-07 NOTE — DISCHARGE NOTE NURSING/CASE MANAGEMENT/SOCIAL WORK - PATIENT PORTAL LINK FT
You can access the FollowMyHealth Patient Portal offered by [facility] by registering at the following website: [email or web address]. By joining [facility]’s FollowMyHealth portal, you will also be able to view your health information using other applications (apps) compatible with our system.

## 2023-10-07 NOTE — DISCHARGE NOTE PROVIDER - NSDCMRMEDTOKEN_GEN_ALL_CORE_FT
aspirin 81 mg oral tablet: 1 tab(s) orally once a day  atorvastatin 20 mg oral tablet: 1 tab(s) orally once a day (at bedtime)  cephalexin 500 mg oral capsule: 1 cap(s) orally every 12 hours  clopidogrel 75 mg oral tablet: 1 tab(s) orally once a day LD 7/18/23  losartan 50 mg oral tablet: 1 tab(s) orally once a day  mirtazapine 15 mg oral tablet: 0.5 tab(s) orally once a day (at bedtime) as needed for  insomnia

## 2023-10-07 NOTE — PROVIDER CONTACT NOTE (OTHER) - DATE AND TIME:
Lab Results   Component Value Date    EGFR 49 08/16/2022    EGFR 36 08/15/2022    EGFR 40 08/03/2022    CREATININE 1 05 08/16/2022    CREATININE 1 34 (H) 08/15/2022    CREATININE 1 23 08/03/2022     · Cr on admission slightly elevated from her baseline at 1 3, most likely due to volume depletion  · Baseline 1 1-1 2  · I's and O's  · IV fluids  · Avoid nephrotoxic agents  · Continue to trend
Saqib Bishop      1979    A DME order for supplies has been placed to [facility]. If there are any issues with your order including not receiving the order please call [facility] at [phone] option 3.  However, Medicare may request all supplies go through home care. Please inquire when you .    [facility] - comes twice per week Phone: [phone] Fax: [phone]    Medications/supplements to aid in healing:  Vitamin B Complex with folic acid take 1 tablet daily  Vitamin B 12 1000 mcg daily  Try staying away from sugar.  Rahul Supplement, one packet into your favorite beverage TWICE a day. You may  purchase at any [facility] or online. A website we recommend is  [email or web address]. Alternatives to Rahul is [employer], [employer], [employer], or [facility].    Group 2 Mattress ordered for through [facility]    Wound Dressing Change: Right Gluteal wound  -Irrigate with 10-20mL Vashe  -Pour 10-20mL of vashe into the wound bed and let sit for 15 minutes  -then pat with 4x4 gauze to absorb the vashe  -Insert 5-10mL Skintegrity Gel into wound  -cover with 2 4x4 gauze and medipore tape or utilize the Durahug dressing  change daily    Repositioning:  Bed: Reposition MINIMALLY every 1-2 hours in bed to relieve pressure and promote perfusion to tissue. Avoid pressure to area!  Chair: When up to the chair, do not sit for longer than one hour total before returning to bed for at least 60 minutes to relieve pressure and promote perfusion to the tissue. Completely recline/tilt for 15 minutes each hour. Sit on a chair cushion when up to the chair.    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 A group 1 mattress will not be adequate     BABITA Mcghee M.D. March 13, 2023    Call us at 
06-Oct-2023 23:38
[phone] if you have any questions about your wounds, have redness or swelling around your wound, have a fever of 101 or greater or if you have any other problems or concerns. We answer the phone Monday through Friday 8 am to 4 pm, please leave a message as we check the voicemail frequently throughout the day.     If you had a positive experience please indicate that on your patient satisfaction survey form that [facility] will be sending you.    It was a pleasure meeting with you today.  Thank you for allowing me and my team the privilege of caring for you today.  YOU are the reason we are here, and I truly hope we provided you with the excellent service you deserve.  Please let us know if there is anything else we can do for you so that we can be sure you are leaving completely satisfied with your care experience.      If you have any billing related questions please call the [facility] Business office at [phone]. The clinic staff does not handle billing related matters.    If you are scheduled to have a follow up appointment, you will receive a reminder call the day before your visit. On the appointment day please arrive 15 minutes prior to your appointment time. If you are unable to keep that appointment, please call the clinic to cancel or reschedule. If you are more than 10 minutes late or greater for your appointment, the clinic policy is that you may be asked to reschedule.    
06-Oct-2023 05:21

## 2023-10-07 NOTE — PROVIDER CONTACT NOTE (OTHER) - SITUATION
pt refused heparin sc ,pt explained and verbalized risks and benedits of heparin sc pt refused heparin sc ,pt explained and verbalized risks and benefits   of heparin sc

## 2023-10-07 NOTE — DISCHARGE NOTE PROVIDER - NSDCFUADDAPPT_GEN_ALL_CORE_FT
Please call for appointment with ENT Dr Laird/Mauro/Tarun/Paulo @ [phone]. Add: [address], [place] [address].)

## 2023-10-07 NOTE — DISCHARGE NOTE PROVIDER - HOSPITAL COURSE
HPI:  73F HTN, HLD, CAD (LAD PCI w/ TITUS 1/2023, LHC 2/2023 mod LAD and LCx diffuse atherosclerosis w/ patent LAD stent, recommended med mgt, on DAPT), BSO (7/2023), ED presentation 10/2 dc'd  w/ Keflex for UTI, now p/w progressive R neck/throat pain. Pt relays R neck/throat pain was present on initial ED presentation, though after d/c she relays progressively worse sx, assc w/ fever, chills, dysphagia, odynophagia, hoarse voice, prompting presentation to , who referred her again to ED. Additionally she c/o constipation (last BM 10/3) w/o noticed flatus, and chronic L ankle edema. Otherwise not endorsing HA, visual disturbance, rhinorrhea, CP, palpitations, SOB, cough, abd pain, n/v/d, melena/hematochezia, dysuria/hematuria/frequency, or other complaint.    ED Course:  VS: febrile tmax 38.3C, HR 80s-90s, hypertensive BP 120s-150/70s-80, RR 16-18, SpO2% 95-99 RA  Labs: APTT 38.4, INR 1.25; bicarb 21, aphos 121; VBG 7.36/44/32/25 lact 1.9   Micro: UA mod blood, 6 RBC, not c/f infxn (though 10/3 w/ large LE, 11 WBC, few bact, UCx no growth); adenovirus+; BCx x2, UCx, HSV PCR, Tonsillar Cx in lab  Imaging:    CT neck soft tissue w/ IVC:  - Enlarged heterogeneous bilateral palatine tonsils. No tonsillar or peritonsillar abscess. Associated edema extends into the   soft palate/uvula. Enlarged lingual tonsils  - Mild asymmetry of the piriform sinuses with the right nonaerated  - Enlarged right jugulodigastric lymph node measuring 2 cm in diameter. 0.5 cm left jugulodigastric lymph node.  - 7 mm nodule left lobe of the thyroid gland.  - 2.6 cm rounded structure by the azygos arch. 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 Adjacent node or mass could not   be excluded. Follow-up chest CT recommended.    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 Calcifications within the walls and narrowing of the lumen   of the immediately cephalad azygous vein just prior to its anastomosis   with the SVC.    ENT c/s in ED, exam, mild uvular edema, pharyngeal erythema, tonsillar exudates R>L, b/l neck swelling R>L. Indirect laryngoscopy shows exudates along base of tongue, widely patent airway c/f viral vs bacterial pharyngitis, neck swelling likely submandibular sialadenitis; recs f/u uvula/tonsil Cx, c/w unasyn  Admit to medicine for further eval/mgt  (05 Oct 2023 02:19)    Hospital Course:  Pt admitted for sepsis 2/2 pharyngitis/tonsillitis/adenovirus and submandibular sialadenitis s/p ENT eval. ID was consulted for abx management. Pt was initially treated with IV clindamycin and unasyn, now transitioned to PO Keflex, to complete 10 day course. Strep culture negative. 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   Pt passed S&S eval, recommended for regular solids and thin liquids.  Pt now tolerating PO diet and remains afebrile. Pt is medically stable to be discharged home with PCP and ENT follow-up (with Dr Laird/Mauro/Tarun/Paulo @ [phone]. Add: [address] [place] [address].)  Discharge and med rec discussed with attending Dr. Nguyen.     Important Medication Changes and Reason:  Keflex 500mg BID for submandibular sialadenitis    Active or Pending Issues Requiring Follow-up:  ENT f/u for submandibular sialadenitis    Advanced Directives:   [x ] Full code  [ ] DNR  [ ] Hospice    Discharge Diagnoses:  Submandibular Sialadenitis  Adenovirus

## 2023-10-07 NOTE — DISCHARGE NOTE PROVIDER - CARE PROVIDER_API CALL
Basil Grider [facility]  Internal Medicine  [address] [place] [address]  Phone: [phone]  Fax: [phone]  Established Patient  Follow Up Time: 1 week

## 2023-10-07 NOTE — DISCHARGE NOTE PROVIDER - NSDCCPCAREPLAN_GEN_ALL_CORE_FT
PRINCIPAL DISCHARGE DIAGNOSIS  Diagnosis: Submandibular sialoadenitis  Assessment and Plan of Treatment: Complete entire course of antibiotics as perscribed.  Follow up with your PCP and ENT (please call for appointment with Dr Laird/Mauro/Tarun/Paulo @ [phone]. Add: [address] , [place] [address].)      SECONDARY DISCHARGE DIAGNOSES  Diagnosis: Abnormal CT scan  Assessment and Plan of Treatment: You were incidentally found to have an aneurysmal dilation of the azygos vein on CT scan. Vascular surgery was consulted, no acute surgical intervention is indicated.  CT neck soft tissue w/ IVC:  - Enlarged heterogeneous bilateral palatine tonsils. No tonsillar or peritonsillar abscess. Associated edema extends into the   soft palate/uvula. Enlarged lingual tonsils  - Mild asymmetry of the piriform sinuses with the right nonaerated  - Enlarged right jugulodigastric lymph node measuring 2 cm in diameter. 0.5 cm left jugulodigastric lymph node.  - 7 mm nodule left lobe of the thyroid gland.  - 2.6 cm rounded structure by the azygos arch. 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 Adjacent node or mass could not   be excluded. Follow-up chest CT recommended.  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 Calcifications within the walls and narrowing of the lumen   of the immediately cephalad azygous vein just prior to its anastomosis   with the SVC.      Diagnosis: Adenovirus infection  Assessment and Plan of Treatment: You tested positive for Adenovirus. Continue symptomatic management.  Take Tylenol as needed for fevers. Follow up with PCP.

## 2023-10-08 LAB
CULTURE RESULTS: SIGNIFICANT CHANGE UP
SPECIMEN SOURCE: SIGNIFICANT CHANGE UP

## 2023-10-09 LAB
CULTURE RESULTS: SIGNIFICANT CHANGE UP
CULTURE RESULTS: SIGNIFICANT CHANGE UP
SPECIMEN SOURCE: SIGNIFICANT CHANGE UP
SPECIMEN SOURCE: SIGNIFICANT CHANGE UP

## 2023-11-15 ENCOUNTER — APPOINTMENT (OUTPATIENT)
Dept: CARDIOLOGY | Facility: CLINIC | Age: 73
End: 2023-11-15
Payer: MEDICARE

## 2023-11-15 ENCOUNTER — NON-APPOINTMENT (OUTPATIENT)
Age: 73
End: 2023-11-15

## 2023-11-15 VITALS
OXYGEN SATURATION: 98 % | RESPIRATION RATE: 12 BRPM | HEIGHT: 63 IN | HEART RATE: 70 BPM | BODY MASS INDEX: 24.27 KG/M2 | SYSTOLIC BLOOD PRESSURE: 116 MMHG | DIASTOLIC BLOOD PRESSURE: 76 MMHG | WEIGHT: 137 LBS

## 2023-11-15 PROCEDURE — 99214 OFFICE O/P EST MOD 30 MIN: CPT

## 2023-11-15 PROCEDURE — 93000 ELECTROCARDIOGRAM COMPLETE: CPT

## 2023-11-15 RX ORDER — LOSARTAN POTASSIUM 50 MG/1
50 TABLET, FILM COATED ORAL DAILY
Qty: 90 | Refills: 1 | Status: ACTIVE | COMMUNITY
Start: 2023-03-22 | End: 1900-01-01

## 2023-11-15 RX ORDER — AMLODIPINE BESYLATE 5 MG/1
5 TABLET ORAL DAILY
Qty: 90 | Refills: 1 | Status: DISCONTINUED | COMMUNITY
Start: 2023-02-22 | End: 2023-11-15

## 2023-11-15 RX ORDER — CLOPIDOGREL BISULFATE 75 MG/1
75 TABLET, FILM COATED ORAL DAILY
Qty: 1 | Refills: 3 | Status: DISCONTINUED | COMMUNITY
End: 2023-11-15

## 2023-12-14 NOTE — ED ADULT NURSE NOTE - NS ED NOTE ABUSE RESPONSE YN
What Type Of Note Output Would You Prefer (Optional)?: Standard Output [de-identified] : At this time was placed in a cam walker boot.  I would like to get an MRI to rule out any tears or occult fractures.  Weight-bear as tolerated in the boot.  He can call me after the MRI to go over the results.  Will schedule him a follow-up for repeat evaluation. Patient will call me if any other problems or concerns.  Patient verbalized understanding and agreed with the plan, all questions were answered in the office today. How Severe Is Your Rash?: mild Is This A New Presentation, Or A Follow-Up?: Rash no

## 2023-12-27 NOTE — DISCHARGE NOTE NURSING/CASE MANAGEMENT/SOCIAL WORK - NURSING SECTION COMPLETE
See additional form needed by patient. Please reach out once complete so he can . Patient/Caregiver provided printed discharge information.

## 2024-01-07 NOTE — PHYSICAL EXAM
[Well Developed] : well developed [Well Nourished] : well nourished [No Acute Distress] : no acute distress [Normal Venous Pressure] : normal venous pressure [Normal Conjunctiva] : normal conjunctiva [No Carotid Bruit] : no carotid bruit [Normal S1, S2] : normal S1, S2 [No Murmur] : no murmur [No Rub] : no rub [No Gallop] : no gallop [Clear Lung Fields] : clear lung fields [Good Air Entry] : good air entry [No Respiratory Distress] : no respiratory distress  [Soft] : abdomen soft [Non Tender] : non-tender [Normal Bowel Sounds] : normal bowel sounds [No Masses/organomegaly] : no masses/organomegaly [Normal Gait] : normal gait [No Edema] : no edema [No Cyanosis] : no cyanosis [No Clubbing] : no clubbing [No Varicosities] : no varicosities [No Rash] : no rash [No Skin Lesions] : no skin lesions [Moves all extremities] : moves all extremities [No Focal Deficits] : no focal deficits [Normal Speech] : normal speech [Alert and Oriented] : alert and oriented [Normal memory] : normal memory

## 2024-01-08 ENCOUNTER — NON-APPOINTMENT (OUTPATIENT)
Age: 74
End: 2024-01-08

## 2024-01-08 ENCOUNTER — APPOINTMENT (OUTPATIENT)
Dept: CARDIOLOGY | Facility: CLINIC | Age: 74
End: 2024-01-08
Payer: MEDICARE

## 2024-01-08 VITALS
SYSTOLIC BLOOD PRESSURE: 144 MMHG | WEIGHT: 140 LBS | DIASTOLIC BLOOD PRESSURE: 73 MMHG | BODY MASS INDEX: 24.8 KG/M2 | RESPIRATION RATE: 12 BRPM | HEIGHT: 63 IN | OXYGEN SATURATION: 99 % | HEART RATE: 72 BPM

## 2024-01-08 DIAGNOSIS — R07.9 CHEST PAIN, UNSPECIFIED: ICD-10-CM

## 2024-01-08 PROCEDURE — 93000 ELECTROCARDIOGRAM COMPLETE: CPT

## 2024-01-08 PROCEDURE — 99214 OFFICE O/P EST MOD 30 MIN: CPT

## 2024-01-08 NOTE — DISCUSSION/SUMMARY
[FreeTextEntry1] : The patient is a 73-year-old female HTN, HLD, CAD with two weeks intermittent chest pain and REYES #1 CV- c/w aspirin, ECHO 1/2023 EF=70%, nuclear stress then reevaluate. #2 HTN- c/w losartan 50 mg  #3 HLD- c/w atorvastatin 20mg, labs next visit. #4 GI- Culturelle for constipation #5 Gyn- s/p resection bilateral tubes and ovaries.  [EKG obtained to assist in diagnosis and management of assessed problem(s)] : EKG obtained to assist in diagnosis and management of assessed problem(s)

## 2024-01-08 NOTE — HISTORY OF PRESENT ILLNESS
[FreeTextEntry1] : Fatmata has been having intermittent chest pain for the last two weeks.  She noticed gets SOB with any exertion which did not happen before.

## 2024-01-11 ENCOUNTER — APPOINTMENT (OUTPATIENT)
Dept: CV DIAGNOSTICS | Facility: HOSPITAL | Age: 74
End: 2024-01-11

## 2024-01-11 ENCOUNTER — NON-APPOINTMENT (OUTPATIENT)
Age: 74
End: 2024-01-11

## 2024-01-11 ENCOUNTER — OUTPATIENT (OUTPATIENT)
Dept: OUTPATIENT SERVICES | Facility: HOSPITAL | Age: 74
LOS: 1 days | End: 2024-01-11
Payer: MEDICARE

## 2024-01-11 DIAGNOSIS — Z90.722 ACQUIRED ABSENCE OF OVARIES, BILATERAL: Chronic | ICD-10-CM

## 2024-01-11 DIAGNOSIS — Z98.890 OTHER SPECIFIED POSTPROCEDURAL STATES: Chronic | ICD-10-CM

## 2024-01-11 DIAGNOSIS — I25.10 ATHEROSCLEROTIC HEART DISEASE OF NATIVE CORONARY ARTERY WITHOUT ANGINA PECTORIS: ICD-10-CM

## 2024-01-11 PROCEDURE — 78452 HT MUSCLE IMAGE SPECT MULT: CPT | Mod: MH

## 2024-01-11 PROCEDURE — 93016 CV STRESS TEST SUPVJ ONLY: CPT | Mod: MH

## 2024-01-11 PROCEDURE — 78452 HT MUSCLE IMAGE SPECT MULT: CPT | Mod: 26,MH

## 2024-01-11 PROCEDURE — 93018 CV STRESS TEST I&R ONLY: CPT | Mod: MH

## 2024-01-11 PROCEDURE — A9500: CPT

## 2024-01-11 PROCEDURE — 93017 CV STRESS TEST TRACING ONLY: CPT

## 2024-01-17 ENCOUNTER — APPOINTMENT (OUTPATIENT)
Dept: CARDIOLOGY | Facility: CLINIC | Age: 74
End: 2024-01-17

## 2024-01-24 ENCOUNTER — APPOINTMENT (OUTPATIENT)
Dept: OTOLARYNGOLOGY | Facility: CLINIC | Age: 74
End: 2024-01-24

## 2024-02-28 ENCOUNTER — NON-APPOINTMENT (OUTPATIENT)
Age: 74
End: 2024-02-28

## 2024-02-28 VITALS
BODY MASS INDEX: 23.22 KG/M2 | HEIGHT: 64 IN | SYSTOLIC BLOOD PRESSURE: 137 MMHG | WEIGHT: 136 LBS | DIASTOLIC BLOOD PRESSURE: 73 MMHG | HEART RATE: 81 BPM

## 2024-02-28 DIAGNOSIS — Z82.49 FAMILY HISTORY OF ISCHEMIC HEART DISEASE AND OTHER DISEASES OF THE CIRCULATORY SYSTEM: ICD-10-CM

## 2024-02-28 DIAGNOSIS — Z86.39 PERSONAL HISTORY OF OTHER ENDOCRINE, NUTRITIONAL AND METABOLIC DISEASE: ICD-10-CM

## 2024-02-28 DIAGNOSIS — N83.299 OTHER OVARIAN CYST, UNSPECIFIED SIDE: ICD-10-CM

## 2024-02-28 DIAGNOSIS — Z80.3 FAMILY HISTORY OF MALIGNANT NEOPLASM OF BREAST: ICD-10-CM

## 2024-02-28 DIAGNOSIS — Z80.6 FAMILY HISTORY OF LEUKEMIA: ICD-10-CM

## 2024-02-28 DIAGNOSIS — Z83.3 FAMILY HISTORY OF DIABETES MELLITUS: ICD-10-CM

## 2024-02-28 DIAGNOSIS — Z86.79 PERSONAL HISTORY OF OTHER DISEASES OF THE CIRCULATORY SYSTEM: ICD-10-CM

## 2024-02-28 DIAGNOSIS — Z87.19 PERSONAL HISTORY OF OTHER DISEASES OF THE DIGESTIVE SYSTEM: ICD-10-CM

## 2024-02-28 DIAGNOSIS — Z12.72 ENCOUNTER FOR SCREENING FOR MALIGNANT NEOPLASM OF VAGINA: ICD-10-CM

## 2024-02-28 DIAGNOSIS — Z92.89 PERSONAL HISTORY OF OTHER MEDICAL TREATMENT: ICD-10-CM

## 2024-02-28 RX ORDER — CLOPIDOGREL BISULFATE 75 MG/1
75 TABLET, FILM COATED ORAL DAILY
Refills: 0 | Status: ACTIVE | COMMUNITY

## 2024-02-28 RX ORDER — UBIDECARENONE 100 MG
100 CAPSULE ORAL
Refills: 0 | Status: ACTIVE | COMMUNITY

## 2024-02-28 RX ORDER — AMLODIPINE BESYLATE 5 MG/1
5 TABLET ORAL DAILY
Refills: 0 | Status: ACTIVE | COMMUNITY

## 2024-02-28 RX ORDER — MIRTAZAPINE 15 MG/1
15 TABLET, FILM COATED ORAL
Refills: 0 | Status: ACTIVE | COMMUNITY

## 2024-02-28 RX ORDER — ASPIRIN 325 MG
TABLET ORAL
Refills: 0 | Status: ACTIVE | COMMUNITY

## 2024-02-28 RX ORDER — DILTIAZEM HYDROCHLORIDE 120 MG/1
120 CAPSULE, COATED, EXTENDED RELEASE ORAL
Refills: 0 | Status: ACTIVE | COMMUNITY

## 2024-05-15 ENCOUNTER — APPOINTMENT (OUTPATIENT)
Dept: CARDIOLOGY | Facility: CLINIC | Age: 74
End: 2024-05-15
Payer: MEDICARE

## 2024-05-15 ENCOUNTER — NON-APPOINTMENT (OUTPATIENT)
Age: 74
End: 2024-05-15

## 2024-05-15 VITALS
WEIGHT: 142 LBS | RESPIRATION RATE: 12 BRPM | OXYGEN SATURATION: 97 % | SYSTOLIC BLOOD PRESSURE: 125 MMHG | DIASTOLIC BLOOD PRESSURE: 81 MMHG | TEMPERATURE: 97.1 F | HEART RATE: 76 BPM | HEIGHT: 64 IN | BODY MASS INDEX: 24.24 KG/M2

## 2024-05-15 DIAGNOSIS — I10 ESSENTIAL (PRIMARY) HYPERTENSION: ICD-10-CM

## 2024-05-15 DIAGNOSIS — E78.5 HYPERLIPIDEMIA, UNSPECIFIED: ICD-10-CM

## 2024-05-15 DIAGNOSIS — I25.10 ATHEROSCLEROTIC HEART DISEASE OF NATIVE CORONARY ARTERY W/OUT ANGINA PECTORIS: ICD-10-CM

## 2024-05-15 PROCEDURE — G2211 COMPLEX E/M VISIT ADD ON: CPT

## 2024-05-15 PROCEDURE — 93000 ELECTROCARDIOGRAM COMPLETE: CPT

## 2024-05-15 PROCEDURE — 99214 OFFICE O/P EST MOD 30 MIN: CPT

## 2024-05-15 RX ORDER — ATORVASTATIN CALCIUM 20 MG/1
20 TABLET, FILM COATED ORAL
Qty: 1 | Refills: 3 | Status: ACTIVE | COMMUNITY
Start: 1900-01-01 | End: 1900-01-01

## 2024-05-15 RX ORDER — EZETIMIBE 10 MG/1
10 TABLET ORAL
Qty: 90 | Refills: 3 | Status: ACTIVE | COMMUNITY
Start: 2024-05-15 | End: 1900-01-01

## 2024-05-15 NOTE — DISCUSSION/SUMMARY
[FreeTextEntry1] : The patient is a 73-year-old female HTN, HLD, CAD with two weeks intermittent chest pain and REYES #1 CV- c/w aspirin, ECHO 1/2023 EF=70%, nuclear stress then reevaluate. #2 HTN- c/w losartan 50 mg  #3 HLD- c/w atorvastatin 20mg, add zetia 10mg for LDL 105mg #4 GI- Culturelle for constipation #5 Gyn- s/p resection bilateral tubes and ovaries.  [EKG obtained to assist in diagnosis and management of assessed problem(s)] : EKG obtained to assist in diagnosis and management of assessed problem(s)

## 2024-05-15 NOTE — HISTORY OF PRESENT ILLNESS
[FreeTextEntry1] : Fatmata is recommended to increase her statin but reluctant because had severe myalgia in the past.

## 2024-07-22 ENCOUNTER — APPOINTMENT (OUTPATIENT)
Dept: OBGYN | Facility: CLINIC | Age: 74
End: 2024-07-22
Payer: MEDICARE

## 2024-07-22 VITALS
BODY MASS INDEX: 24.59 KG/M2 | SYSTOLIC BLOOD PRESSURE: 152 MMHG | HEART RATE: 65 BPM | WEIGHT: 144 LBS | DIASTOLIC BLOOD PRESSURE: 65 MMHG | HEIGHT: 64 IN

## 2024-07-22 DIAGNOSIS — R92.30 DENSE BREASTS, UNSPECIFIED: ICD-10-CM

## 2024-07-22 DIAGNOSIS — Z12.39 ENCOUNTER FOR OTHER SCREENING FOR MALIGNANT NEOPLASM OF BREAST: ICD-10-CM

## 2024-07-22 DIAGNOSIS — Z11.51 ENCOUNTER FOR SCREENING FOR HUMAN PAPILLOMAVIRUS (HPV): ICD-10-CM

## 2024-07-22 PROCEDURE — G0101: CPT

## 2024-07-22 NOTE — HISTORY OF PRESENT ILLNESS
[Patient reported mammogram was normal] : Patient reported mammogram was normal [Patient reported breast sonogram was normal] : Patient reported breast sonogram was normal [Patient reported PAP Smear was normal] : Patient reported PAP Smear was normal [Patient reported bone density results were abnormal] : Patient reported bone density results were abnormal [Patient reported colonoscopy was normal] : Patient reported colonoscopy was normal [N] : Patient is not sexually active [Y] : Positive pregnancy history [LMP unknown] : LMP unknown [N/A] : pregnancy not applicable [unknown] : Patient is unsure of the date of her LMP [Currently In Menopause] : currently in menopause [No] : Patient does not have concerns regarding sex [Previously active] : previously active [Men] : men [FreeTextEntry1] : GYN Annual [TextBox_4] : 75yo presents for GYN exam. [Mammogramdate] : 5/24/23 [BreastSonogramDate] : 5/24/23 [PapSmeardate] : 4/25/23 [BoneDensityDate] : 5/5/23 [TextBox_37] : osteoporosis  [ColonoscopyDate] : 2/26/24 [HPVDate] : 4/25/23 [PGxTotal] : 1 [[facility]] : [facility]

## 2024-07-22 NOTE — PHYSICAL EXAM
[Chaperone Present] : A chaperone was present in the examining room during all aspects of the physical examination [[id number]] : A chaperone was present during the pelvic exam. [FreeTextEntry2] : KULWINDER Brunson [Appropriately responsive] : appropriately responsive [Alert] : alert [No Acute Distress] : no acute distress [No Lymphadenopathy] : no lymphadenopathy [Soft] : soft [Non-tender] : non-tender [Non-distended] : non-distended [Oriented x3] : oriented x3 [Examination Of The Breasts] : a normal appearance [No Masses] : no breast masses were palpable [Labia Majora] : normal [Labia Minora] : normal [Normal] : normal [Uterine Adnexae] : normal

## 2024-07-23 LAB — HPV HIGH+LOW RISK DNA PNL CVX: NOT DETECTED

## 2024-07-30 LAB — CYTOLOGY CVX/VAG DOC THIN PREP: ABNORMAL

## 2024-08-19 NOTE — ASU PREOP CHECKLIST - ALLERGY BAND ON
Physical Therapy  Name: Elda Flood  MRN:  [id number]  Date of service:  8/19/2024 08/19/24 1152   Restrictions/Precautions   Restrictions/Precautions Fall Risk   Subjective   Subjective Patient willing however yells in pain with all movement   Oxygen Therapy   O2 Device None (Room air)   Bed Mobility   Supine to Sit Dependent/Total;Maximal assistance   Transfers   Sit to Stand Unable to assess   Stand to Sit Unable to assess   Bed to Chair Unable to assess   Lateral Transfers Unable to assess   Balance   Sitting - Static Fair   Other Activities   Comment EOB sitting   Short Term Goals   Time Frame for Short Term Goals 14 DAYS   Short Term Goal 1 BED MOB MIN ASSIST   Short Term Goal 2 TRANSFERS MIN ASSIST   Short Term Goal 3 AMB 25' RW MIN ASSIST   Conditions Requiring Skilled Therapeutic Intervention   Body Structures, Functions, Activity Limitations Requiring Skilled Therapeutic Intervention Decreased functional mobility ;Decreased ADL status;Decreased ROM;Decreased strength;Decreased endurance;Decreased balance;Increased pain;Decreased posture   Assessment Patient is very limited by pain.  She is very senitive to the lightest touch or movement of bed.  Spoke to nursing about better pain control. Patient is willing to participate with therapy however is very limited by pain in ye legs.  Discussed slideboard transfer to wheelchair. Unable to attempt transfer at this time due to decreased left knee flexion and pain in ye legs.   Discharge Recommendations Continue to assess pending progress   Activity Tolerance   Activity Tolerance Patient limited by pain;Treatment limited secondary to medical complications   Physical Therapy Plan   General Plan 5-7 times per week   Current Treatment Recommendations Strengthening;ROM;Balance training;Functional mobility training;Transfer training;Gait training;Patient/Caregiver education & training;Safety education & training   PT Plan of Care   Monday X   Safety Devices  done

## 2024-09-18 ENCOUNTER — APPOINTMENT (OUTPATIENT)
Dept: CARDIOLOGY | Facility: CLINIC | Age: 74
End: 2024-09-18
Payer: MEDICARE

## 2024-09-18 ENCOUNTER — NON-APPOINTMENT (OUTPATIENT)
Age: 74
End: 2024-09-18

## 2024-09-18 VITALS
HEART RATE: 69 BPM | RESPIRATION RATE: 19 BRPM | DIASTOLIC BLOOD PRESSURE: 71 MMHG | BODY MASS INDEX: 24.41 KG/M2 | OXYGEN SATURATION: 98 % | WEIGHT: 143 LBS | SYSTOLIC BLOOD PRESSURE: 125 MMHG | HEIGHT: 64 IN

## 2024-09-18 DIAGNOSIS — M79.605 PAIN IN LEFT LEG: ICD-10-CM

## 2024-09-18 DIAGNOSIS — I25.10 ATHEROSCLEROTIC HEART DISEASE OF NATIVE CORONARY ARTERY W/OUT ANGINA PECTORIS: ICD-10-CM

## 2024-09-18 DIAGNOSIS — I10 ESSENTIAL (PRIMARY) HYPERTENSION: ICD-10-CM

## 2024-09-18 DIAGNOSIS — E78.5 HYPERLIPIDEMIA, UNSPECIFIED: ICD-10-CM

## 2024-09-18 LAB
25(OH)D3 SERPL-MCNC: 24 NG/ML
ALBUMIN SERPL ELPH-MCNC: 4.3 G/DL
ALP BLD-CCNC: 137 U/L
ALT SERPL-CCNC: 15 U/L
ANION GAP SERPL CALC-SCNC: 13 MMOL/L
AST SERPL-CCNC: 19 U/L
BILIRUB SERPL-MCNC: 0.4 MG/DL
BUN SERPL-MCNC: 13 MG/DL
CALCIUM SERPL-MCNC: 9.9 MG/DL
CHLORIDE SERPL-SCNC: 105 MMOL/L
CO2 SERPL-SCNC: 23 MMOL/L
CREAT SERPL-MCNC: 0.85 MG/DL
EGFR: 72 ML/MIN/1.73M2
GLUCOSE SERPL-MCNC: 85 MG/DL
HCT VFR BLD CALC: 38.4 %
HGB BLD-MCNC: 12.5 G/DL
MCHC RBC-ENTMCNC: 29.4 PG
MCHC RBC-ENTMCNC: 32.6 GM/DL
MCV RBC AUTO: 90.4 FL
PLATELET # BLD AUTO: 268 K/UL
POTASSIUM SERPL-SCNC: 4.6 MMOL/L
PROT SERPL-MCNC: 7.2 G/DL
RBC # BLD: 4.25 M/UL
RBC # FLD: 13.3 %
SODIUM SERPL-SCNC: 141 MMOL/L
WBC # FLD AUTO: 6.84 K/UL

## 2024-09-18 PROCEDURE — G2211 COMPLEX E/M VISIT ADD ON: CPT

## 2024-09-18 PROCEDURE — 99214 OFFICE O/P EST MOD 30 MIN: CPT

## 2024-09-18 PROCEDURE — 93000 ELECTROCARDIOGRAM COMPLETE: CPT

## 2024-09-18 NOTE — DISCUSSION/SUMMARY
[FreeTextEntry1] : The patient is a 74-year-old female HTN, HLD, CAD with left leg pain and trouble with statins. #1 CV- c/w aspirin, ECHO 1/2023 EF=70%, nuclear stress  #2 HTN- c/w losartan 50 mg  #3 HLD- c/w atorvastatin increase to 40mg #4 GI- Culturelle for constipation #5 Vascular- venous doppler left leg.  [EKG obtained to assist in diagnosis and management of assessed problem(s)] : EKG obtained to assist in diagnosis and management of assessed problem(s)

## 2024-09-18 NOTE — HISTORY OF PRESENT ILLNESS
[FreeTextEntry1] : Fatmata gets occasional sharp pain on left chest and axilla at times that makes her rub the area. Lasts few seconds and subsides. No lightheadedness, dizziness, or short of breath. No exercise. Does not want to take PCSK9. Stopped Zetia because made cramps unbearable but willing to try increase in statin.

## 2024-09-19 LAB
CHOLEST SERPL-MCNC: 152 MG/DL
ESTIMATED AVERAGE GLUCOSE: 114 MG/DL
HBA1C MFR BLD HPLC: 5.6 %
HDLC SERPL-MCNC: 32 MG/DL
LDLC SERPL CALC-MCNC: 78 MG/DL
NONHDLC SERPL-MCNC: 120 MG/DL
TRIGL SERPL-MCNC: 255 MG/DL

## 2024-09-19 RX ORDER — FENOFIBRATE 134 MG/1
134 CAPSULE ORAL
Qty: 90 | Refills: 3 | Status: ACTIVE | COMMUNITY
Start: 2024-09-19 | End: 1900-01-01

## 2024-10-22 NOTE — ED ADULT NURSE NOTE - CAS EDN DISCHARGE INTERVENTIONS
Bed/Stretcher in lowest position, wheels locked, appropriate side rails in place/Call bell, personal items and telephone in reach/Instruct patient to call for assistance before getting out of bed/chair/stretcher/Non-slip footwear applied when patient is off stretcher/[place] to call system/Physically safe environment - no spills, clutter or unnecessary equipment/Purposeful proactive rounding/Room/bathroom lighting operational, light cord in reach IV discontinued, cath removed intact

## 2024-12-24 PROBLEM — F10.90 ALCOHOL USE: Status: INACTIVE | Noted: 2023-01-19

## 2025-03-17 ENCOUNTER — NON-APPOINTMENT (OUTPATIENT)
Age: 75
End: 2025-03-17

## 2025-03-17 ENCOUNTER — APPOINTMENT (OUTPATIENT)
Dept: CARDIOLOGY | Facility: CLINIC | Age: 75
End: 2025-03-17
Payer: MEDICARE

## 2025-03-17 VITALS
OXYGEN SATURATION: 98 % | TEMPERATURE: 97.2 F | WEIGHT: 139 LBS | SYSTOLIC BLOOD PRESSURE: 133 MMHG | RESPIRATION RATE: 19 BRPM | BODY MASS INDEX: 23.86 KG/M2 | DIASTOLIC BLOOD PRESSURE: 70 MMHG | HEART RATE: 65 BPM

## 2025-03-17 DIAGNOSIS — E78.5 HYPERLIPIDEMIA, UNSPECIFIED: ICD-10-CM

## 2025-03-17 DIAGNOSIS — I10 ESSENTIAL (PRIMARY) HYPERTENSION: ICD-10-CM

## 2025-03-17 DIAGNOSIS — I25.10 ATHEROSCLEROTIC HEART DISEASE OF NATIVE CORONARY ARTERY W/OUT ANGINA PECTORIS: ICD-10-CM

## 2025-03-17 PROCEDURE — 99214 OFFICE O/P EST MOD 30 MIN: CPT

## 2025-03-17 PROCEDURE — 93000 ELECTROCARDIOGRAM COMPLETE: CPT

## 2025-03-17 PROCEDURE — G2211 COMPLEX E/M VISIT ADD ON: CPT

## 2025-03-18 LAB
ALBUMIN SERPL ELPH-MCNC: 4.5 G/DL
ALP BLD-CCNC: 88 U/L
ALT SERPL-CCNC: 32 U/L
ANION GAP SERPL CALC-SCNC: 13 MMOL/L
AST SERPL-CCNC: 33 U/L
BILIRUB SERPL-MCNC: 0.3 MG/DL
BUN SERPL-MCNC: 18 MG/DL
CALCIUM SERPL-MCNC: 9.9 MG/DL
CHLORIDE SERPL-SCNC: 104 MMOL/L
CHOLEST SERPL-MCNC: 142 MG/DL
CK SERPL-CCNC: 70 U/L
CO2 SERPL-SCNC: 23 MMOL/L
CREAT SERPL-MCNC: 1 MG/DL
EGFRCR SERPLBLD CKD-EPI 2021: 59 ML/MIN/1.73M2
GLUCOSE SERPL-MCNC: 85 MG/DL
HDLC SERPL-MCNC: 53 MG/DL
LDLC SERPL-MCNC: 72 MG/DL
NONHDLC SERPL-MCNC: 89 MG/DL
POTASSIUM SERPL-SCNC: 4.7 MMOL/L
PROT SERPL-MCNC: 7.5 G/DL
SODIUM SERPL-SCNC: 140 MMOL/L
TRIGL SERPL-MCNC: 87 MG/DL

## 2025-03-19 LAB
APPEARANCE: CLEAR
BILIRUBIN URINE: NEGATIVE
BLOOD URINE: NEGATIVE
COLOR: YELLOW
GLUCOSE QUALITATIVE U: NEGATIVE MG/DL
KETONES URINE: NEGATIVE MG/DL
LEUKOCYTE ESTERASE URINE: NEGATIVE
NITRITE URINE: NEGATIVE
PH URINE: 6.5
PROTEIN URINE: NEGATIVE MG/DL
SPECIFIC GRAVITY URINE: 1.01
UROBILINOGEN URINE: 0.2 MG/DL

## 2025-05-14 ENCOUNTER — NON-APPOINTMENT (OUTPATIENT)
Age: 75
End: 2025-05-14

## 2025-05-14 ENCOUNTER — APPOINTMENT (OUTPATIENT)
Dept: CARDIOLOGY | Facility: CLINIC | Age: 75
End: 2025-05-14
Payer: MEDICARE

## 2025-05-14 VITALS
HEART RATE: 64 BPM | BODY MASS INDEX: 23.05 KG/M2 | WEIGHT: 135 LBS | OXYGEN SATURATION: 99 % | DIASTOLIC BLOOD PRESSURE: 74 MMHG | HEIGHT: 64 IN | RESPIRATION RATE: 12 BRPM | SYSTOLIC BLOOD PRESSURE: 129 MMHG

## 2025-05-14 DIAGNOSIS — I25.10 ATHEROSCLEROTIC HEART DISEASE OF NATIVE CORONARY ARTERY W/OUT ANGINA PECTORIS: ICD-10-CM

## 2025-05-14 DIAGNOSIS — I10 ESSENTIAL (PRIMARY) HYPERTENSION: ICD-10-CM

## 2025-05-14 DIAGNOSIS — E78.5 HYPERLIPIDEMIA, UNSPECIFIED: ICD-10-CM

## 2025-05-14 PROCEDURE — 93000 ELECTROCARDIOGRAM COMPLETE: CPT

## 2025-05-14 PROCEDURE — 99214 OFFICE O/P EST MOD 30 MIN: CPT

## 2025-05-14 PROCEDURE — G2211 COMPLEX E/M VISIT ADD ON: CPT

## 2025-05-14 RX ORDER — CLOPIDOGREL BISULFATE 75 MG/1
75 TABLET, FILM COATED ORAL DAILY
Qty: 1 | Refills: 3 | Status: ACTIVE | COMMUNITY
Start: 2025-05-14 | End: 1900-01-01

## 2025-08-13 ENCOUNTER — NON-APPOINTMENT (OUTPATIENT)
Age: 75
End: 2025-08-13

## 2025-08-13 ENCOUNTER — APPOINTMENT (OUTPATIENT)
Dept: CARDIOLOGY | Facility: CLINIC | Age: 75
End: 2025-08-13
Payer: MEDICARE

## 2025-08-13 VITALS
SYSTOLIC BLOOD PRESSURE: 115 MMHG | TEMPERATURE: 97.3 F | HEART RATE: 65 BPM | BODY MASS INDEX: 23.05 KG/M2 | HEIGHT: 64 IN | DIASTOLIC BLOOD PRESSURE: 68 MMHG | RESPIRATION RATE: 15 BRPM | WEIGHT: 135 LBS | OXYGEN SATURATION: 97 %

## 2025-08-13 DIAGNOSIS — E78.5 HYPERLIPIDEMIA, UNSPECIFIED: ICD-10-CM

## 2025-08-13 DIAGNOSIS — I25.10 ATHEROSCLEROTIC HEART DISEASE OF NATIVE CORONARY ARTERY W/OUT ANGINA PECTORIS: ICD-10-CM

## 2025-08-13 DIAGNOSIS — I10 ESSENTIAL (PRIMARY) HYPERTENSION: ICD-10-CM

## 2025-08-13 PROCEDURE — 99214 OFFICE O/P EST MOD 30 MIN: CPT

## 2025-08-13 PROCEDURE — 93000 ELECTROCARDIOGRAM COMPLETE: CPT

## 2025-08-13 PROCEDURE — G2211 COMPLEX E/M VISIT ADD ON: CPT

## 2025-08-14 LAB
25(OH)D3 SERPL-MCNC: 31.9 NG/ML
ALBUMIN SERPL ELPH-MCNC: 4.5 G/DL
ALP BLD-CCNC: 98 U/L
ALT SERPL-CCNC: 37 U/L
ANION GAP SERPL CALC-SCNC: 15 MMOL/L
AST SERPL-CCNC: 35 U/L
BILIRUB SERPL-MCNC: 0.4 MG/DL
BUN SERPL-MCNC: 16 MG/DL
CALCIUM SERPL-MCNC: 10.2 MG/DL
CHLORIDE SERPL-SCNC: 107 MMOL/L
CHOLEST SERPL-MCNC: 136 MG/DL
CO2 SERPL-SCNC: 21 MMOL/L
CREAT SERPL-MCNC: 0.96 MG/DL
EGFRCR SERPLBLD CKD-EPI 2021: 62 ML/MIN/1.73M2
ESTIMATED AVERAGE GLUCOSE: 111 MG/DL
GLUCOSE SERPL-MCNC: 82 MG/DL
HBA1C MFR BLD HPLC: 5.5 %
HCT VFR BLD CALC: 38 %
HDLC SERPL-MCNC: 50 MG/DL
HGB BLD-MCNC: 12.2 G/DL
LDLC SERPL-MCNC: 70 MG/DL
MCHC RBC-ENTMCNC: 29.7 PG
MCHC RBC-ENTMCNC: 32.1 G/DL
MCV RBC AUTO: 92.5 FL
NONHDLC SERPL-MCNC: 87 MG/DL
PLATELET # BLD AUTO: 253 K/UL
POTASSIUM SERPL-SCNC: 4.8 MMOL/L
PROT SERPL-MCNC: 7.3 G/DL
RBC # BLD: 4.11 M/UL
RBC # FLD: 13.7 %
SODIUM SERPL-SCNC: 143 MMOL/L
TRIGL SERPL-MCNC: 91 MG/DL
WBC # FLD AUTO: 5.3 K/UL

## 2025-08-15 DIAGNOSIS — Z98.890 OTHER SPECIFIED POSTPROCEDURAL STATES: ICD-10-CM

## (undated) DEVICE — DRSG STERISTRIPS 0.5 X 4"

## (undated) DEVICE — SUT MONOCRYL 4-0 27" PS-2 UNDYED

## (undated) DEVICE — DRAPE INSTRUMENT POUCH 6.75" X 11"

## (undated) DEVICE — ENDOCATCH 10MM SPECIMEN POUCH

## (undated) DEVICE — MEDICINE CUP WITH LID 60ML

## (undated) DEVICE — DRSG TELFA 3 X 8

## (undated) DEVICE — VENODYNE/SCD SLEEVE CALF MEDIUM

## (undated) DEVICE — PACK PERI GYN

## (undated) DEVICE — TROCAR COVIDIEN BLUNT TIP HASSAN 10MM

## (undated) DEVICE — POSITIONER PURPLE ARM ONE STEP (LARGE)

## (undated) DEVICE — GOWN XL

## (undated) DEVICE — WARMING BLANKET UPPER ADULT

## (undated) DEVICE — GLV 5.5 PROTEXIS (WHITE)

## (undated) DEVICE — GLV 6 PROTEXIS (BLUE)

## (undated) DEVICE — TUBING HYDRO-SURG PLUS IRRIGATOR W SMOKEVAC & PROBE

## (undated) DEVICE — DRSG OPSITE 2.5 X 2"

## (undated) DEVICE — PACK D&C

## (undated) DEVICE — TIP METZENBAUM SCISSOR MONOPOLAR ENDOCUT (ORANGE)

## (undated) DEVICE — TUBING OLYMPUS INSUFFLATION

## (undated) DEVICE — UTERINE MANIPULATOR COOPER SURGICAL 5MM 33CM GREEN

## (undated) DEVICE — SOL IRR POUR NS 0.9% 500ML

## (undated) DEVICE — BLADE SURGICAL #15 CARBON

## (undated) DEVICE — PACK GENERAL LAPAROSCOPY

## (undated) DEVICE — DRAPE 3/4 SHEET 52X76"

## (undated) DEVICE — ELCTR GROUNDING PAD ADULT COVIDIEN

## (undated) DEVICE — D HELP - CLEARVIEW CLEARIFY SYSTEM

## (undated) DEVICE — ELCTR BOVIE PENCIL SMOKE EVACUATION

## (undated) DEVICE — LIGASURE BLUNT TIP 37CM

## (undated) DEVICE — FOLEY TRAY 16FR 5CC LF UMETER CLOSED

## (undated) DEVICE — POSITIONER PINK PAD PIGAZZI SYSTEM

## (undated) DEVICE — CANISTER DISPOSABLE THIN WALL 3000CC

## (undated) DEVICE — SUT VICRYL 0 27" UR-6

## (undated) DEVICE — TROCAR COVIDIEN VERSAPORT BLADELESS OPTICAL 5MM STANDARD

## (undated) DEVICE — PREP BETADINE SPONGE STICKS